# Patient Record
Sex: FEMALE | Race: WHITE | NOT HISPANIC OR LATINO | Employment: OTHER | URBAN - METROPOLITAN AREA
[De-identification: names, ages, dates, MRNs, and addresses within clinical notes are randomized per-mention and may not be internally consistent; named-entity substitution may affect disease eponyms.]

---

## 2021-02-05 LAB — BCS RECOMMENDATION EXT: NORMAL

## 2022-04-12 ENCOUNTER — HISTORICAL (OUTPATIENT)
Dept: LAB | Facility: HOSPITAL | Age: 72
End: 2022-04-12

## 2022-04-12 LAB
ABS NEUT (OLG): 9.29 (ref 2.1–9.2)
CRP SERPL HS-MCNC: 9 MG/L (ref 0–5)
ERYTHROCYTE [DISTWIDTH] IN BLOOD BY AUTOMATED COUNT: 13.3 % (ref 11.5–17)
ERYTHROCYTE [SEDIMENTATION RATE] IN BLOOD: 25 MM/H (ref 0–30)
HCT VFR BLD AUTO: 39.7 % (ref 37–47)
HGB BLD-MCNC: 12.7 G/DL (ref 12–16)
MCH RBC QN AUTO: 29.9 PG (ref 27–31)
MCHC RBC AUTO-ENTMCNC: 32 G/DL (ref 33–36)
MCV RBC AUTO: 93.4 FL (ref 80–94)
NRBC BLD AUTO-RTO: 0 % (ref 0–0.2)
PLATELET # BLD AUTO: 308 10*3/UL (ref 130–400)
PMV BLD AUTO: 9.3 FL (ref 7.4–10.4)
RBC # BLD AUTO: 4.25 10*6/UL (ref 4.2–5.4)
WBC # SPEC AUTO: 41.2 10*3/UL (ref 4.5–11.5)

## 2022-05-12 DIAGNOSIS — T84.018D FAILURE OF TOTAL KNEE REPLACEMENT, SUBSEQUENT ENCOUNTER: Primary | ICD-10-CM

## 2022-05-12 DIAGNOSIS — Z96.659 FAILURE OF TOTAL KNEE REPLACEMENT, SUBSEQUENT ENCOUNTER: Primary | ICD-10-CM

## 2022-05-27 ENCOUNTER — OFFICE VISIT (OUTPATIENT)
Dept: ORTHOPEDICS | Facility: CLINIC | Age: 72
End: 2022-05-27
Payer: MEDICARE

## 2022-05-27 ENCOUNTER — HOSPITAL ENCOUNTER (OUTPATIENT)
Dept: RADIOLOGY | Facility: HOSPITAL | Age: 72
Discharge: HOME | End: 2022-05-27
Attending: NURSE PRACTITIONER
Payer: MEDICARE

## 2022-05-27 VITALS
DIASTOLIC BLOOD PRESSURE: 72 MMHG | HEART RATE: 79 BPM | WEIGHT: 180 LBS | SYSTOLIC BLOOD PRESSURE: 140 MMHG | HEIGHT: 60 IN | BODY MASS INDEX: 35.34 KG/M2 | TEMPERATURE: 98 F

## 2022-05-27 DIAGNOSIS — Z01.818 PREOP TESTING: ICD-10-CM

## 2022-05-27 DIAGNOSIS — T84.82XS ARTHROFIBROSIS OF TOTAL KNEE ARTHROPLASTY, SEQUELA: Primary | ICD-10-CM

## 2022-05-27 DIAGNOSIS — T84.82XS ARTHROFIBROSIS OF TOTAL KNEE ARTHROPLASTY, SEQUELA: ICD-10-CM

## 2022-05-27 PROCEDURE — 71046 X-RAY EXAM CHEST 2 VIEWS: CPT | Mod: TC

## 2022-05-27 PROCEDURE — 99213 OFFICE O/P EST LOW 20 MIN: CPT | Mod: ,,, | Performed by: NURSE PRACTITIONER

## 2022-05-27 PROCEDURE — 99213 PR OFFICE/OUTPT VISIT, EST, LEVL III, 20-29 MIN: ICD-10-PCS | Mod: ,,, | Performed by: NURSE PRACTITIONER

## 2022-05-27 RX ORDER — TRANEXAMIC ACID 650 MG/1
1950 TABLET ORAL
Status: CANCELLED | OUTPATIENT
Start: 2022-05-27 | End: 2022-05-27

## 2022-05-27 RX ORDER — GABAPENTIN 100 MG/1
600 CAPSULE ORAL
Status: CANCELLED | OUTPATIENT
Start: 2022-05-27

## 2022-05-27 RX ORDER — LISDEXAMFETAMINE DIMESYLATE 30 MG/1
30 CAPSULE ORAL EVERY MORNING
COMMUNITY
Start: 2021-09-29

## 2022-05-27 RX ORDER — B-COMPLEX WITH VITAMIN C
1 TABLET ORAL DAILY
COMMUNITY

## 2022-05-27 RX ORDER — PRAMIPEXOLE DIHYDROCHLORIDE 1.5 MG/1
1.5 TABLET ORAL 2 TIMES DAILY
COMMUNITY
Start: 2021-07-21

## 2022-05-27 RX ORDER — TELMISARTAN AND HYDROCHLORTHIAZIDE 40; 12.5 MG/1; MG/1
1 TABLET ORAL DAILY
COMMUNITY
Start: 2021-07-21

## 2022-05-27 RX ORDER — KETOROLAC TROMETHAMINE 30 MG/ML
15 INJECTION, SOLUTION INTRAMUSCULAR; INTRAVENOUS
Status: CANCELLED | OUTPATIENT
Start: 2022-05-27 | End: 2022-05-27

## 2022-05-27 RX ORDER — MELOXICAM 7.5 MG/1
7.5 TABLET ORAL DAILY
Status: ON HOLD | COMMUNITY
Start: 2022-02-26 | End: 2022-06-08 | Stop reason: CLARIF

## 2022-05-27 RX ORDER — CHOLECALCIFEROL (VITAMIN D3) 125 MCG
5000 CAPSULE ORAL DAILY
COMMUNITY

## 2022-05-27 RX ORDER — SODIUM CHLORIDE 9 MG/ML
INJECTION, SOLUTION INTRAVENOUS CONTINUOUS
Status: CANCELLED | OUTPATIENT
Start: 2022-05-27

## 2022-05-27 RX ORDER — SCOLOPAMINE TRANSDERMAL SYSTEM 1 MG/1
1 PATCH, EXTENDED RELEASE TRANSDERMAL
Status: CANCELLED | OUTPATIENT
Start: 2022-05-27

## 2022-05-27 RX ORDER — PREGABALIN 75 MG/1
75 CAPSULE ORAL
COMMUNITY
Start: 2021-09-29 | End: 2022-06-10

## 2022-05-27 RX ORDER — AMITRIPTYLINE HYDROCHLORIDE 50 MG/1
50 TABLET, FILM COATED ORAL
Status: ON HOLD | COMMUNITY
Start: 2021-07-21 | End: 2022-06-08 | Stop reason: CLARIF

## 2022-05-27 RX ORDER — ACETAMINOPHEN 500 MG
1000 TABLET ORAL
Status: CANCELLED | OUTPATIENT
Start: 2022-05-27 | End: 2022-05-27

## 2022-05-27 RX ORDER — AMLODIPINE BESYLATE 5 MG/1
5 TABLET ORAL
Status: ON HOLD | COMMUNITY
Start: 2021-07-21 | End: 2022-06-10 | Stop reason: SDUPTHER

## 2022-05-27 RX ORDER — METOPROLOL TARTRATE 50 MG/1
75 TABLET ORAL DAILY
COMMUNITY
Start: 2021-07-21

## 2022-05-27 RX ORDER — PANTOPRAZOLE SODIUM 40 MG/1
40 TABLET, DELAYED RELEASE ORAL DAILY
COMMUNITY
Start: 2021-07-21 | End: 2023-05-02 | Stop reason: SDUPTHER

## 2022-05-27 NOTE — PROGRESS NOTES
Past Medical History:   Diagnosis Date    Acid reflux     CLL (chronic lymphocytic leukemia)     Hypertension        Past Surgical History:   Procedure Laterality Date    CARPAL TUNNEL RELEASE      CYST REMOVAL      KNEE SURGERY      lumpectomy      SHOULDER SURGERY      SINUS SURGERY      spinal injections      THYROID SURGERY      TONSILLECTOMY         Current Outpatient Medications   Medication Sig    amitriptyline (ELAVIL) 50 MG tablet Take 50 mg by mouth.    amLODIPine (NORVASC) 5 MG tablet Take 5 mg by mouth.    B-complex with vitamin C (Z-BEC OR EQUIV) tablet Take 1 tablet by mouth once daily.    cholecalciferol, vitamin D3, 125 mcg (5,000 unit) capsule Take 5,000 Units by mouth.    lisdexamfetamine (VYVANSE) 70 MG capsule Take 70 mg by mouth.    meloxicam (MOBIC) 7.5 MG tablet Take 7.5 mg by mouth once daily.    metoprolol tartrate (LOPRESSOR) 50 MG tablet Take 75 mg by mouth.    pantoprazole (PROTONIX) 40 MG tablet Take 40 mg by mouth.    polysaccharide iron complex 200 mg iron Cap Take 200 mg by mouth.    pramipexole (MIRAPEX) 1.5 MG tablet Take 1.5 mg by mouth 2 (two) times a day.    pregabalin (LYRICA) 75 MG capsule Take 75 mg by mouth.    telmisartan-hydrochlorothiazide (MICARDIS HCT) 40-12.5 mg per tablet Take by mouth.    vitamin B complex (B COMPLEX 1 ORAL)      No current facility-administered medications for this visit.       Review of patient's allergies indicates:   Allergen Reactions    Duloxetine      Other reaction(s): Bradycardia, Hypotension, Vomit    Lorazepam      Other reaction(s): Confusion  amnesia         Family History   Family history unknown: Yes       Social History     Socioeconomic History    Marital status:    Tobacco Use    Smoking status: Former Smoker    Smokeless tobacco: Never Used   Substance and Sexual Activity    Alcohol use: Never    Drug use: Never       Chief Complaint:   Chief Complaint   Patient presents with    Left Knee -  Pain    Pre-op Exam     Revision of left Total knee 6/8/22, C/O intermitten pain in left knee at this time, difficulty walking due to leg not being straight.       History of present illness: Amita Trejo is a 71 y.o. female, presents to clinic today in regards to her left knee pain. She has a hx of a L TKA with significant arthrofibrosis. This continues to be ongoing despite aggressive PT for ROM. This has affected her daily living activities. She does feel as though she is reached a point of instability and would like to proceed with a revision of left total knee arthroplasty.        Review of Systems:    Denies fevers, chills, chest pain, shortness of breath. Comprehensive review of systems performed and otherwise negative except as noted in HPI     Physical Examination:    General: awake and alert, no acute distress, healthy appearing  Head and Neck: Head atraumatic/normocephalic. Moist MM  CV: brisk cap refill  Lungs: non-labored breathing, w/o cough or SOB  Skin: no rashes present, warm to touch  Neuro: sensation grossly intact distally       Vital Signs:    Vitals:    05/27/22 0945   BP: (!) 140/72   Pulse: 79   Temp: 97.6 °F (36.4 °C)       Body mass index is 35.15 kg/m².    Examination left knee:  Incision clean dry and intact. No erythema or drainage or signs of infection.  Sensation intact distally to left foot  Positive FHL/EHL/gastrocsoleus/tib ant  Brisk capillary refill to left foot  No swelling or signs of DVT  Range of motion: extension at 15 and flexion at 90  Stable to varus valgus  Stable to anterior and posterior drawer       Assessment::Arthrofibrosis Left total knee arthroplasty    Plan:  At this point the patient is tried and failed all conservative management with regards to their left knee status post left knee arthroplasty. They have tried and failed nonoperative management including: Anti-inflammatories, activity modification. All of these have failed to completely remove their pain.  They have pain going up and down stairs as well as walking on level ground. The knee pain is affecting activities of daily living They feel that they've reached a point of disability with regards to their knee. The patient was worked up for infection and found to be negative.The patient would like to proceed with surgical intervention and would be a good candidate for a revision of left total knee replacement.    Total knee arthroplasty procedure, alternatives, risks, and benefits were discussed in detail. The risks including but not limited to: infection, need for revision surgery, pain, swelling, loosening, injury to surrounding neurovascular structures, stiffness, incomplete resolution of pain, DVT, PE, and death were discussed in detail. Despite these risks, the patient would like to proceed with surgical intervention. All questions were answered, no guarantees made. Will plan for revision of left TKA on 06/08/22.    The above findings, diagnostics, and treatment plan were discussed with Dr. Sohan Bowens who is in agreement with the plan of care.    This note was created using [employer] voice recognition software that occasionally misinterpreted phrases or words.    Consult note is delivered via Epic messaging service.

## 2022-05-27 NOTE — H&P (VIEW-ONLY)
Past Medical History:   Diagnosis Date    Acid reflux     CLL (chronic lymphocytic leukemia)     Hypertension        Past Surgical History:   Procedure Laterality Date    CARPAL TUNNEL RELEASE      CYST REMOVAL      KNEE SURGERY      lumpectomy      SHOULDER SURGERY      SINUS SURGERY      spinal injections      THYROID SURGERY      TONSILLECTOMY         Current Outpatient Medications   Medication Sig    amitriptyline (ELAVIL) 50 MG tablet Take 50 mg by mouth.    amLODIPine (NORVASC) 5 MG tablet Take 5 mg by mouth.    B-complex with vitamin C (Z-BEC OR EQUIV) tablet Take 1 tablet by mouth once daily.    cholecalciferol, vitamin D3, 125 mcg (5,000 unit) capsule Take 5,000 Units by mouth.    lisdexamfetamine (VYVANSE) 70 MG capsule Take 70 mg by mouth.    meloxicam (MOBIC) 7.5 MG tablet Take 7.5 mg by mouth once daily.    metoprolol tartrate (LOPRESSOR) 50 MG tablet Take 75 mg by mouth.    pantoprazole (PROTONIX) 40 MG tablet Take 40 mg by mouth.    polysaccharide iron complex 200 mg iron Cap Take 200 mg by mouth.    pramipexole (MIRAPEX) 1.5 MG tablet Take 1.5 mg by mouth 2 (two) times a day.    pregabalin (LYRICA) 75 MG capsule Take 75 mg by mouth.    telmisartan-hydrochlorothiazide (MICARDIS HCT) 40-12.5 mg per tablet Take by mouth.    vitamin B complex (B COMPLEX 1 ORAL)      No current facility-administered medications for this visit.       Review of patient's allergies indicates:   Allergen Reactions    Duloxetine      Other reaction(s): Bradycardia, Hypotension, Vomit    Lorazepam      Other reaction(s): Confusion  amnesia         Family History   Family history unknown: Yes       Social History     Socioeconomic History    Marital status:    Tobacco Use    Smoking status: Former Smoker    Smokeless tobacco: Never Used   Substance and Sexual Activity    Alcohol use: Never    Drug use: Never       Chief Complaint:   Chief Complaint   Patient presents with    Left Knee -  Pain    Pre-op Exam     Revision of left Total knee 6/8/22, C/O intermitten pain in left knee at this time, difficulty walking due to leg not being straight.       History of present illness: Amita Trejo is a 71 y.o. female, presents to clinic today in regards to her left knee pain. She has a hx of a L TKA with significant arthrofibrosis. This continues to be ongoing despite aggressive PT for ROM. This has affected her daily living activities. She does feel as though she is reached a point of instability and would like to proceed with a revision of left total knee arthroplasty.        Review of Systems:    Denies fevers, chills, chest pain, shortness of breath. Comprehensive review of systems performed and otherwise negative except as noted in HPI     Physical Examination:    General: awake and alert, no acute distress, healthy appearing  Head and Neck: Head atraumatic/normocephalic. Moist MM  CV: brisk cap refill  Lungs: non-labored breathing, w/o cough or SOB  Skin: no rashes present, warm to touch  Neuro: sensation grossly intact distally       Vital Signs:    Vitals:    05/27/22 0945   BP: (!) 140/72   Pulse: 79   Temp: 97.6 °F (36.4 °C)       Body mass index is 35.15 kg/m².    Examination left knee:  Incision clean dry and intact. No erythema or drainage or signs of infection.  Sensation intact distally to left foot  Positive FHL/EHL/gastrocsoleus/tib ant  Brisk capillary refill to left foot  No swelling or signs of DVT  Range of motion: extension at 15 and flexion at 90  Stable to varus valgus  Stable to anterior and posterior drawer       Assessment::Arthrofibrosis Left total knee arthroplasty    Plan:  At this point the patient is tried and failed all conservative management with regards to their left knee status post left knee arthroplasty. They have tried and failed nonoperative management including: Anti-inflammatories, activity modification. All of these have failed to completely remove their pain.  They have pain going up and down stairs as well as walking on level ground. The knee pain is affecting activities of daily living They feel that they've reached a point of disability with regards to their knee. The patient was worked up for infection and found to be negative.The patient would like to proceed with surgical intervention and would be a good candidate for a revision of left total knee replacement.    Total knee arthroplasty procedure, alternatives, risks, and benefits were discussed in detail. The risks including but not limited to: infection, need for revision surgery, pain, swelling, loosening, injury to surrounding neurovascular structures, stiffness, incomplete resolution of pain, DVT, PE, and death were discussed in detail. Despite these risks, the patient would like to proceed with surgical intervention. All questions were answered, no guarantees made. Will plan for revision of left TKA on 06/08/22.    The above findings, diagnostics, and treatment plan were discussed with Dr. Sohan Bowens who is in agreement with the plan of care.    This note was created using [employer] voice recognition software that occasionally misinterpreted phrases or words.    Consult note is delivered via Epic messaging service.

## 2022-06-08 ENCOUNTER — ANESTHESIA (OUTPATIENT)
Dept: SURGERY | Facility: HOSPITAL | Age: 72
DRG: 468 | End: 2022-06-08
Payer: MEDICARE

## 2022-06-08 ENCOUNTER — ANESTHESIA EVENT (OUTPATIENT)
Dept: SURGERY | Facility: HOSPITAL | Age: 72
DRG: 468 | End: 2022-06-08
Payer: MEDICARE

## 2022-06-08 ENCOUNTER — HOSPITAL ENCOUNTER (INPATIENT)
Facility: HOSPITAL | Age: 72
LOS: 2 days | Discharge: HOME | DRG: 468 | End: 2022-06-10
Attending: ORTHOPAEDIC SURGERY | Admitting: ORTHOPAEDIC SURGERY
Payer: MEDICARE

## 2022-06-08 DIAGNOSIS — T84.82XS ARTHROFIBROSIS OF TOTAL KNEE ARTHROPLASTY, SEQUELA: ICD-10-CM

## 2022-06-08 DIAGNOSIS — T84.018D FAILURE OF TOTAL KNEE REPLACEMENT, SUBSEQUENT ENCOUNTER: ICD-10-CM

## 2022-06-08 DIAGNOSIS — Z96.659 FAILURE OF TOTAL KNEE REPLACEMENT, SUBSEQUENT ENCOUNTER: ICD-10-CM

## 2022-06-08 DIAGNOSIS — Z01.818 PREOP TESTING: ICD-10-CM

## 2022-06-08 PROBLEM — T84.018A FAILED TOTAL KNEE ARTHROPLASTY: Status: ACTIVE | Noted: 2022-06-08

## 2022-06-08 LAB
HCT VFR BLD AUTO: 37.1 % (ref 37–47)
HGB BLD-MCNC: 11.8 GM/DL (ref 12–16)
POCT GLUCOSE: 108 MG/DL (ref 70–110)

## 2022-06-08 PROCEDURE — 63600175 PHARM REV CODE 636 W HCPCS: Performed by: ORTHOPAEDIC SURGERY

## 2022-06-08 PROCEDURE — 36000711: Performed by: ORTHOPAEDIC SURGERY

## 2022-06-08 PROCEDURE — 87070 CULTURE OTHR SPECIMN AEROBIC: CPT | Performed by: ORTHOPAEDIC SURGERY

## 2022-06-08 PROCEDURE — 27800903 OPTIME MED/SURG SUP & DEVICES OTHER IMPLANTS: Performed by: ORTHOPAEDIC SURGERY

## 2022-06-08 PROCEDURE — 37000008 HC ANESTHESIA 1ST 15 MINUTES: Performed by: ORTHOPAEDIC SURGERY

## 2022-06-08 PROCEDURE — C1776 JOINT DEVICE (IMPLANTABLE): HCPCS | Performed by: ORTHOPAEDIC SURGERY

## 2022-06-08 PROCEDURE — 25000003 PHARM REV CODE 250

## 2022-06-08 PROCEDURE — 94799 UNLISTED PULMONARY SVC/PX: CPT

## 2022-06-08 PROCEDURE — 36415 COLL VENOUS BLD VENIPUNCTURE: CPT | Performed by: ORTHOPAEDIC SURGERY

## 2022-06-08 PROCEDURE — 27487 PR REVISE KNEE JOINT REPLACE,ALL PARTS: ICD-10-PCS | Mod: LT,,, | Performed by: ORTHOPAEDIC SURGERY

## 2022-06-08 PROCEDURE — 63600175 PHARM REV CODE 636 W HCPCS: Performed by: NURSE ANESTHETIST, CERTIFIED REGISTERED

## 2022-06-08 PROCEDURE — 88331 PATH CONSLTJ SURG 1 BLK 1SPC: CPT | Performed by: ORTHOPAEDIC SURGERY

## 2022-06-08 PROCEDURE — 27201423 OPTIME MED/SURG SUP & DEVICES STERILE SUPPLY: Performed by: ORTHOPAEDIC SURGERY

## 2022-06-08 PROCEDURE — 30000890 SPECIMEN TO PATHOLOGY: Performed by: ORTHOPAEDIC SURGERY

## 2022-06-08 PROCEDURE — A4216 STERILE WATER/SALINE, 10 ML: HCPCS | Performed by: ORTHOPAEDIC SURGERY

## 2022-06-08 PROCEDURE — 88300 SURGICAL PATH GROSS: CPT | Performed by: ORTHOPAEDIC SURGERY

## 2022-06-08 PROCEDURE — 87075 CULTR BACTERIA EXCEPT BLOOD: CPT | Performed by: ORTHOPAEDIC SURGERY

## 2022-06-08 PROCEDURE — 25000003 PHARM REV CODE 250: Performed by: ORTHOPAEDIC SURGERY

## 2022-06-08 PROCEDURE — 51702 INSERT TEMP BLADDER CATH: CPT | Performed by: ORTHOPAEDIC SURGERY

## 2022-06-08 PROCEDURE — 11000001 HC ACUTE MED/SURG PRIVATE ROOM

## 2022-06-08 PROCEDURE — 71000033 HC RECOVERY, INTIAL HOUR: Performed by: ORTHOPAEDIC SURGERY

## 2022-06-08 PROCEDURE — 36000710: Performed by: ORTHOPAEDIC SURGERY

## 2022-06-08 PROCEDURE — 25000003 PHARM REV CODE 250: Performed by: NURSE ANESTHETIST, CERTIFIED REGISTERED

## 2022-06-08 PROCEDURE — 25000003 PHARM REV CODE 250: Performed by: NURSE PRACTITIONER

## 2022-06-08 PROCEDURE — 51798 US URINE CAPACITY MEASURE: CPT | Performed by: ORTHOPAEDIC SURGERY

## 2022-06-08 PROCEDURE — 51798 US URINE CAPACITY MEASURE: CPT

## 2022-06-08 PROCEDURE — 85014 HEMATOCRIT: CPT | Performed by: ORTHOPAEDIC SURGERY

## 2022-06-08 PROCEDURE — 97162 PT EVAL MOD COMPLEX 30 MIN: CPT

## 2022-06-08 PROCEDURE — C1713 ANCHOR/SCREW BN/BN,TIS/BN: HCPCS | Performed by: ORTHOPAEDIC SURGERY

## 2022-06-08 PROCEDURE — 27487 REVISE/REPLACE KNEE JOINT: CPT | Mod: LT,,, | Performed by: ORTHOPAEDIC SURGERY

## 2022-06-08 PROCEDURE — 30000890 HC MISC. SEND OUT TEST: Performed by: ORTHOPAEDIC SURGERY

## 2022-06-08 PROCEDURE — 87205 SMEAR GRAM STAIN: CPT | Performed by: ORTHOPAEDIC SURGERY

## 2022-06-08 PROCEDURE — 37000009 HC ANESTHESIA EA ADD 15 MINS: Performed by: ORTHOPAEDIC SURGERY

## 2022-06-08 PROCEDURE — 94761 N-INVAS EAR/PLS OXIMETRY MLT: CPT

## 2022-06-08 PROCEDURE — 88304 TISSUE EXAM BY PATHOLOGIST: CPT | Performed by: ORTHOPAEDIC SURGERY

## 2022-06-08 DEVICE — TOBRA FULL DOSE ANTIBIOTIC BONE CEMENT, 10 PACK CATALOG NUMBER IS 6197-9-010
Type: IMPLANTABLE DEVICE | Site: KNEE | Status: FUNCTIONAL
Brand: SIMPLEX

## 2022-06-08 DEVICE — FLUTED STEM EXT DIAMETER 12MM X 80MM
Type: IMPLANTABLE DEVICE | Site: KNEE | Status: FUNCTIONAL
Brand: BALANCED KNEE REVISION SYSTEM

## 2022-06-08 RX ORDER — BISACODYL 10 MG
10 SUPPOSITORY, RECTAL RECTAL DAILY
Status: DISCONTINUED | OUTPATIENT
Start: 2022-06-11 | End: 2022-06-10 | Stop reason: HOSPADM

## 2022-06-08 RX ORDER — LIDOCAINE HYDROCHLORIDE 10 MG/ML
INJECTION, SOLUTION EPIDURAL; INFILTRATION; INTRACAUDAL; PERINEURAL
Status: DISCONTINUED | OUTPATIENT
Start: 2022-06-08 | End: 2022-06-08

## 2022-06-08 RX ORDER — IPRATROPIUM BROMIDE AND ALBUTEROL SULFATE 2.5; .5 MG/3ML; MG/3ML
3 SOLUTION RESPIRATORY (INHALATION) ONCE AS NEEDED
Status: DISCONTINUED | OUTPATIENT
Start: 2022-06-08 | End: 2022-06-10 | Stop reason: HOSPADM

## 2022-06-08 RX ORDER — ROPIVACAINE HYDROCHLORIDE 5 MG/ML
INJECTION, SOLUTION EPIDURAL; INFILTRATION; PERINEURAL
Status: DISCONTINUED | OUTPATIENT
Start: 2022-06-08 | End: 2022-06-08 | Stop reason: HOSPADM

## 2022-06-08 RX ORDER — TALC
6 POWDER (GRAM) TOPICAL NIGHTLY PRN
Status: DISCONTINUED | OUTPATIENT
Start: 2022-06-08 | End: 2022-06-10 | Stop reason: HOSPADM

## 2022-06-08 RX ORDER — SODIUM CITRATE AND CITRIC ACID MONOHYDRATE 334; 500 MG/5ML; MG/5ML
30 SOLUTION ORAL ONCE
Status: CANCELLED | OUTPATIENT
Start: 2022-06-08 | End: 2022-06-08

## 2022-06-08 RX ORDER — FAMOTIDINE 20 MG/1
20 TABLET, FILM COATED ORAL 2 TIMES DAILY
Status: DISCONTINUED | OUTPATIENT
Start: 2022-06-08 | End: 2022-06-10 | Stop reason: HOSPADM

## 2022-06-08 RX ORDER — ONDANSETRON 2 MG/ML
4 INJECTION INTRAMUSCULAR; INTRAVENOUS EVERY 6 HOURS PRN
Status: DISCONTINUED | OUTPATIENT
Start: 2022-06-08 | End: 2022-06-10 | Stop reason: HOSPADM

## 2022-06-08 RX ORDER — CALCIUM CARBONATE 200(500)MG
500 TABLET,CHEWABLE ORAL 3 TIMES DAILY PRN
Status: DISCONTINUED | OUTPATIENT
Start: 2022-06-08 | End: 2022-06-10 | Stop reason: HOSPADM

## 2022-06-08 RX ORDER — KETOROLAC TROMETHAMINE 30 MG/ML
INJECTION, SOLUTION INTRAMUSCULAR; INTRAVENOUS
Status: DISCONTINUED | OUTPATIENT
Start: 2022-06-08 | End: 2022-06-08 | Stop reason: HOSPADM

## 2022-06-08 RX ORDER — HYDROMORPHONE HYDROCHLORIDE 2 MG/ML
0.2 INJECTION, SOLUTION INTRAMUSCULAR; INTRAVENOUS; SUBCUTANEOUS EVERY 5 MIN PRN
Status: DISCONTINUED | OUTPATIENT
Start: 2022-06-08 | End: 2022-06-08

## 2022-06-08 RX ORDER — EPINEPHRINE 1 MG/ML
INJECTION, SOLUTION INTRACARDIAC; INTRAMUSCULAR; INTRAVENOUS; SUBCUTANEOUS
Status: DISPENSED
Start: 2022-06-08 | End: 2022-06-08

## 2022-06-08 RX ORDER — ACETAMINOPHEN 10 MG/ML
1000 INJECTION, SOLUTION INTRAVENOUS ONCE
Status: COMPLETED | OUTPATIENT
Start: 2022-06-08 | End: 2022-06-08

## 2022-06-08 RX ORDER — TRANEXAMIC ACID 650 MG/1
1950 TABLET ORAL
Status: COMPLETED | OUTPATIENT
Start: 2022-06-08 | End: 2022-06-08

## 2022-06-08 RX ORDER — ONDANSETRON 4 MG/1
8 TABLET, ORALLY DISINTEGRATING ORAL EVERY 6 HOURS PRN
Status: CANCELLED | OUTPATIENT
Start: 2022-06-08

## 2022-06-08 RX ORDER — CEFAZOLIN SODIUM 2 G/50ML
2 SOLUTION INTRAVENOUS
Status: COMPLETED | OUTPATIENT
Start: 2022-06-08 | End: 2022-06-09

## 2022-06-08 RX ORDER — METOCLOPRAMIDE HYDROCHLORIDE 5 MG/ML
10 INJECTION INTRAMUSCULAR; INTRAVENOUS
Status: COMPLETED | OUTPATIENT
Start: 2022-06-08 | End: 2022-06-09

## 2022-06-08 RX ORDER — MIDAZOLAM HYDROCHLORIDE 1 MG/ML
2 INJECTION INTRAMUSCULAR; INTRAVENOUS ONCE AS NEEDED
Status: CANCELLED | OUTPATIENT
Start: 2022-06-08 | End: 2033-11-03

## 2022-06-08 RX ORDER — ROPIVACAINE HYDROCHLORIDE 5 MG/ML
INJECTION, SOLUTION EPIDURAL; INFILTRATION; PERINEURAL
Status: DISPENSED
Start: 2022-06-08 | End: 2022-06-08

## 2022-06-08 RX ORDER — SODIUM CHLORIDE, SODIUM GLUCONATE, SODIUM ACETATE, POTASSIUM CHLORIDE AND MAGNESIUM CHLORIDE 30; 37; 368; 526; 502 MG/100ML; MG/100ML; MG/100ML; MG/100ML; MG/100ML
1000 INJECTION, SOLUTION INTRAVENOUS CONTINUOUS
Status: CANCELLED | OUTPATIENT
Start: 2022-06-08 | End: 2022-07-08

## 2022-06-08 RX ORDER — SODIUM CHLORIDE 9 MG/ML
INJECTION, SOLUTION INTRAVENOUS CONTINUOUS
Status: DISCONTINUED | OUTPATIENT
Start: 2022-06-08 | End: 2022-06-10 | Stop reason: HOSPADM

## 2022-06-08 RX ORDER — SODIUM CHLORIDE 0.9 % (FLUSH) 0.9 %
SYRINGE (ML) INJECTION
Status: DISPENSED
Start: 2022-06-08 | End: 2022-06-08

## 2022-06-08 RX ORDER — METHOCARBAMOL 750 MG/1
750 TABLET, FILM COATED ORAL EVERY 8 HOURS PRN
Status: DISCONTINUED | OUTPATIENT
Start: 2022-06-08 | End: 2022-06-10 | Stop reason: HOSPADM

## 2022-06-08 RX ORDER — BUPIVACAINE HYDROCHLORIDE 7.5 MG/ML
INJECTION, SOLUTION EPIDURAL; RETROBULBAR
Status: COMPLETED | OUTPATIENT
Start: 2022-06-08 | End: 2022-06-08

## 2022-06-08 RX ORDER — SCOLOPAMINE TRANSDERMAL SYSTEM 1 MG/1
1 PATCH, EXTENDED RELEASE TRANSDERMAL
Status: DISCONTINUED | OUTPATIENT
Start: 2022-06-08 | End: 2022-06-10 | Stop reason: HOSPADM

## 2022-06-08 RX ORDER — ACETAMINOPHEN 500 MG
1000 TABLET ORAL
Status: COMPLETED | OUTPATIENT
Start: 2022-06-08 | End: 2022-06-08

## 2022-06-08 RX ORDER — GABAPENTIN 300 MG/1
600 CAPSULE ORAL
Status: COMPLETED | OUTPATIENT
Start: 2022-06-08 | End: 2022-06-08

## 2022-06-08 RX ORDER — SODIUM CHLORIDE 9 MG/ML
INJECTION, SOLUTION INTRAMUSCULAR; INTRAVENOUS; SUBCUTANEOUS
Status: DISCONTINUED | OUTPATIENT
Start: 2022-06-08 | End: 2022-06-08 | Stop reason: HOSPADM

## 2022-06-08 RX ORDER — NAPROXEN SODIUM 220 MG/1
81 TABLET, FILM COATED ORAL 2 TIMES DAILY
Status: DISCONTINUED | OUTPATIENT
Start: 2022-06-09 | End: 2022-06-10 | Stop reason: HOSPADM

## 2022-06-08 RX ORDER — DEXAMETHASONE SODIUM PHOSPHATE 4 MG/ML
INJECTION, SOLUTION INTRA-ARTICULAR; INTRALESIONAL; INTRAMUSCULAR; INTRAVENOUS; SOFT TISSUE
Status: DISCONTINUED | OUTPATIENT
Start: 2022-06-08 | End: 2022-06-08

## 2022-06-08 RX ORDER — GABAPENTIN 300 MG/1
300 CAPSULE ORAL NIGHTLY
Status: DISCONTINUED | OUTPATIENT
Start: 2022-06-08 | End: 2022-06-10

## 2022-06-08 RX ORDER — MEPERIDINE HYDROCHLORIDE 25 MG/ML
12.5 INJECTION INTRAMUSCULAR; INTRAVENOUS; SUBCUTANEOUS ONCE AS NEEDED
Status: DISCONTINUED | OUTPATIENT
Start: 2022-06-08 | End: 2022-06-08

## 2022-06-08 RX ORDER — ACETAMINOPHEN 10 MG/ML
1000 INJECTION, SOLUTION INTRAVENOUS ONCE
Status: DISCONTINUED | OUTPATIENT
Start: 2022-06-08 | End: 2022-06-08

## 2022-06-08 RX ORDER — EPINEPHRINE 1 MG/ML
INJECTION, SOLUTION INTRACARDIAC; INTRAMUSCULAR; INTRAVENOUS; SUBCUTANEOUS
Status: DISCONTINUED | OUTPATIENT
Start: 2022-06-08 | End: 2022-06-08 | Stop reason: HOSPADM

## 2022-06-08 RX ORDER — KETOROLAC TROMETHAMINE 30 MG/ML
15 INJECTION, SOLUTION INTRAMUSCULAR; INTRAVENOUS
Status: ACTIVE | OUTPATIENT
Start: 2022-06-08 | End: 2022-06-08

## 2022-06-08 RX ORDER — KETOROLAC TROMETHAMINE 30 MG/ML
INJECTION, SOLUTION INTRAMUSCULAR; INTRAVENOUS
Status: DISPENSED
Start: 2022-06-08 | End: 2022-06-08

## 2022-06-08 RX ORDER — AMLODIPINE BESYLATE 5 MG/1
5 TABLET ORAL DAILY
Status: DISCONTINUED | OUTPATIENT
Start: 2022-06-08 | End: 2022-06-10

## 2022-06-08 RX ORDER — DOCUSATE SODIUM 100 MG/1
200 CAPSULE, LIQUID FILLED ORAL DAILY
Status: DISCONTINUED | OUTPATIENT
Start: 2022-06-09 | End: 2022-06-10 | Stop reason: HOSPADM

## 2022-06-08 RX ORDER — MIDAZOLAM HYDROCHLORIDE 1 MG/ML
INJECTION INTRAMUSCULAR; INTRAVENOUS
Status: DISCONTINUED | OUTPATIENT
Start: 2022-06-08 | End: 2022-06-08

## 2022-06-08 RX ORDER — LACTULOSE 10 G/15ML
20 SOLUTION ORAL EVERY 6 HOURS PRN
Status: DISCONTINUED | OUTPATIENT
Start: 2022-06-08 | End: 2022-06-10 | Stop reason: HOSPADM

## 2022-06-08 RX ORDER — PROPOFOL 10 MG/ML
VIAL (ML) INTRAVENOUS CONTINUOUS PRN
Status: DISCONTINUED | OUTPATIENT
Start: 2022-06-08 | End: 2022-06-08

## 2022-06-08 RX ORDER — TRAMADOL HYDROCHLORIDE 50 MG/1
50 TABLET ORAL EVERY 4 HOURS PRN
Status: DISCONTINUED | OUTPATIENT
Start: 2022-06-08 | End: 2022-06-10

## 2022-06-08 RX ORDER — HYDROCODONE BITARTRATE AND ACETAMINOPHEN 5; 325 MG/1; MG/1
1 TABLET ORAL EVERY 4 HOURS PRN
Status: DISCONTINUED | OUTPATIENT
Start: 2022-06-08 | End: 2022-06-10

## 2022-06-08 RX ORDER — EPHEDRINE SULFATE 50 MG/ML
INJECTION, SOLUTION INTRAVENOUS
Status: DISCONTINUED | OUTPATIENT
Start: 2022-06-08 | End: 2022-06-08

## 2022-06-08 RX ORDER — PHENYLEPHRINE HYDROCHLORIDE 10 MG/ML
INJECTION INTRAVENOUS CONTINUOUS PRN
Status: DISCONTINUED | OUTPATIENT
Start: 2022-06-08 | End: 2022-06-08

## 2022-06-08 RX ORDER — MORPHINE SULFATE 4 MG/ML
4 INJECTION, SOLUTION INTRAMUSCULAR; INTRAVENOUS
Status: DISCONTINUED | OUTPATIENT
Start: 2022-06-08 | End: 2022-06-10 | Stop reason: HOSPADM

## 2022-06-08 RX ORDER — MAG HYDROX/ALUMINUM HYD/SIMETH 200-200-20
30 SUSPENSION, ORAL (FINAL DOSE FORM) ORAL EVERY 6 HOURS PRN
Status: DISCONTINUED | OUTPATIENT
Start: 2022-06-08 | End: 2022-06-10 | Stop reason: HOSPADM

## 2022-06-08 RX ORDER — AMOXICILLIN 250 MG
2 CAPSULE ORAL 2 TIMES DAILY
Status: DISCONTINUED | OUTPATIENT
Start: 2022-06-08 | End: 2022-06-10 | Stop reason: HOSPADM

## 2022-06-08 RX ORDER — TELMISARTAN AND HYDROCHLORTHIAZIDE 40; 12.5 MG/1; MG/1
1 TABLET ORAL DAILY
Status: DISCONTINUED | OUTPATIENT
Start: 2022-06-08 | End: 2022-06-10 | Stop reason: HOSPADM

## 2022-06-08 RX ORDER — POLYETHYLENE GLYCOL 3350 17 G/17G
17 POWDER, FOR SOLUTION ORAL NIGHTLY
Status: DISCONTINUED | OUTPATIENT
Start: 2022-06-08 | End: 2022-06-10 | Stop reason: HOSPADM

## 2022-06-08 RX ORDER — MORPHINE SULFATE 10 MG/ML
INJECTION INTRAMUSCULAR; INTRAVENOUS; SUBCUTANEOUS
Status: DISPENSED
Start: 2022-06-08 | End: 2022-06-08

## 2022-06-08 RX ORDER — CEFAZOLIN SODIUM 2 G/50ML
2 SOLUTION INTRAVENOUS
Status: DISCONTINUED | OUTPATIENT
Start: 2022-06-08 | End: 2022-06-10 | Stop reason: HOSPADM

## 2022-06-08 RX ORDER — MORPHINE SULFATE 10 MG/ML
INJECTION INTRAMUSCULAR; INTRAVENOUS; SUBCUTANEOUS
Status: DISCONTINUED | OUTPATIENT
Start: 2022-06-08 | End: 2022-06-08 | Stop reason: HOSPADM

## 2022-06-08 RX ORDER — ONDANSETRON 2 MG/ML
INJECTION INTRAMUSCULAR; INTRAVENOUS
Status: DISCONTINUED | OUTPATIENT
Start: 2022-06-08 | End: 2022-06-08

## 2022-06-08 RX ORDER — LIDOCAINE HYDROCHLORIDE 10 MG/ML
1 INJECTION, SOLUTION EPIDURAL; INFILTRATION; INTRACAUDAL; PERINEURAL ONCE
Status: CANCELLED | OUTPATIENT
Start: 2022-06-08 | End: 2022-06-08

## 2022-06-08 RX ADMIN — PROPOFOL 50 MCG/KG/MIN: 10 INJECTION, EMULSION INTRAVENOUS at 10:06

## 2022-06-08 RX ADMIN — PHENYLEPHRINE HYDROCHLORIDE 0.2 MCG/KG/MIN: 10 INJECTION INTRAVENOUS at 10:06

## 2022-06-08 RX ADMIN — ACETAMINOPHEN 1000 MG: 500 TABLET ORAL at 07:06

## 2022-06-08 RX ADMIN — NORETHINDRONE AND ETHINYL ESTRADIOL 10 MG: KIT ORAL at 11:06

## 2022-06-08 RX ADMIN — SODIUM CHLORIDE: 9 INJECTION, SOLUTION INTRAVENOUS at 01:06

## 2022-06-08 RX ADMIN — POLYETHYLENE GLYCOL 3350 17 G: 17 POWDER, FOR SOLUTION ORAL at 08:06

## 2022-06-08 RX ADMIN — GABAPENTIN 600 MG: 300 CAPSULE ORAL at 07:06

## 2022-06-08 RX ADMIN — METOCLOPRAMIDE 10 MG: 5 INJECTION, SOLUTION INTRAMUSCULAR; INTRAVENOUS at 10:06

## 2022-06-08 RX ADMIN — TRANEXAMIC ACID 1950 MG: 650 TABLET ORAL at 07:06

## 2022-06-08 RX ADMIN — NORETHINDRONE AND ETHINYL ESTRADIOL 10 MG: KIT ORAL at 10:06

## 2022-06-08 RX ADMIN — LIDOCAINE HYDROCHLORIDE 40 MG: 10 INJECTION, SOLUTION EPIDURAL; INFILTRATION; INTRACAUDAL; PERINEURAL at 10:06

## 2022-06-08 RX ADMIN — METOPROLOL TARTRATE 75 MG: 50 TABLET, FILM COATED ORAL at 08:06

## 2022-06-08 RX ADMIN — SODIUM CHLORIDE, SODIUM GLUCONATE, SODIUM ACETATE, POTASSIUM CHLORIDE AND MAGNESIUM CHLORIDE: 526; 502; 368; 37; 30 INJECTION, SOLUTION INTRAVENOUS at 09:06

## 2022-06-08 RX ADMIN — METOCLOPRAMIDE 10 MG: 5 INJECTION, SOLUTION INTRAMUSCULAR; INTRAVENOUS at 03:06

## 2022-06-08 RX ADMIN — CEFAZOLIN SODIUM 2 G: 2 SOLUTION INTRAVENOUS at 03:06

## 2022-06-08 RX ADMIN — DEXAMETHASONE SODIUM PHOSPHATE 8 MG: 4 INJECTION, SOLUTION INTRA-ARTICULAR; INTRALESIONAL; INTRAMUSCULAR; INTRAVENOUS; SOFT TISSUE at 10:06

## 2022-06-08 RX ADMIN — FAMOTIDINE 20 MG: 20 TABLET ORAL at 08:06

## 2022-06-08 RX ADMIN — ACETAMINOPHEN 1000 MG: 10 INJECTION, SOLUTION INTRAVENOUS at 05:06

## 2022-06-08 RX ADMIN — CEFAZOLIN SODIUM 2 G: 2 SOLUTION INTRAVENOUS at 10:06

## 2022-06-08 RX ADMIN — DEXTROSE 2 G: 50 INJECTION, SOLUTION INTRAVENOUS at 09:06

## 2022-06-08 RX ADMIN — TRAMADOL HYDROCHLORIDE 50 MG: 50 TABLET, COATED ORAL at 02:06

## 2022-06-08 RX ADMIN — SENNOSIDES AND DOCUSATE SODIUM 2 TABLET: 50; 8.6 TABLET ORAL at 08:06

## 2022-06-08 RX ADMIN — GABAPENTIN 300 MG: 300 CAPSULE ORAL at 08:06

## 2022-06-08 RX ADMIN — NORETHINDRONE AND ETHINYL ESTRADIOL 10 MG: KIT ORAL at 12:06

## 2022-06-08 RX ADMIN — TRAMADOL HYDROCHLORIDE 50 MG: 50 TABLET, COATED ORAL at 08:06

## 2022-06-08 RX ADMIN — ONDANSETRON HYDROCHLORIDE 4 MG: 2 SOLUTION INTRAMUSCULAR; INTRAVENOUS at 12:06

## 2022-06-08 RX ADMIN — BUPIVACAINE HYDROCHLORIDE 2 ML: 7.5 INJECTION, SOLUTION EPIDURAL; RETROBULBAR at 10:06

## 2022-06-08 RX ADMIN — MIDAZOLAM 2 MG: 1 INJECTION INTRAMUSCULAR; INTRAVENOUS at 09:06

## 2022-06-08 NOTE — ANESTHESIA PROCEDURE NOTES
Spinal    Diagnosis: surgery  Patient location during procedure: OR    Staffing  Authorizing Provider: Mir Wang MD  Performing Provider: Mir Wang MD    Preanesthetic Checklist  Completed: patient identified, IV checked, site marked, risks and benefits discussed, surgical consent, monitors and equipment checked, pre-op evaluation and timeout performed  Spinal Block  Patient position: sitting  Prep: ChloraPrep  Patient monitoring: heart rate, continuous pulse ox and frequent blood pressure checks  Approach: midline  Location: L3-4  Injection technique: single shot  CSF Fluid: clear free-flowing CSF  Needle  Needle type: pencil-tip   Needle gauge: 25 G  Needle length: 3.5 in  Additional Documentation: negative aspiration for heme and no paresthesia on injection  Needle localization: anatomical landmarks  Assessment  Sensory level: T4   Dermatomal levels determined by alcohol wipe  Ease of block: easy  Patient's tolerance of the procedure: comfortable throughout block  Medications:    Medications: bupivacaine (pf) (MARCAINE) injection 0.75% - Intraspinal   2 mL - 6/8/2022 10:01:00 AM

## 2022-06-08 NOTE — BRIEF OP NOTE
OPERATIVE REPORT      Patient: Amita Trejo   : 1950    MRN: [id number]  Date: 2022      Surgeon: Sohan Bowens MD  Assistant: Sivan Dorsey NP, [facility].  Certified first assist was necessary as a skilled set of hands and was necessary for proper patient positioning as well as assistance with closure in place with multiple implants.  Preoperative Diagnosis:  Left failed TKA with arthofibrosis  Postoperative Diagnosis: Same  Procedure:  Revision L TKA - both components  Wound vac left knee  Anesthesiologist: Mir Wang MD  OR Staff: Circulator: Eddi Reed RN  Scrub Person: Paulo Zamarripa; BRENDAN Staton  Implants:   Implant Name Type Inv. Item Serial No.  Lot No. LRB No. Used Action   CEMENT BONE ANTIBIO SIMPLEX P - [id number]  CEMENT BONE ANTIBIO SIMPLEX P  [employer]. [id number] Left 2 Implanted   Stem 12mm 80mm fluted     [id number] Left 1 Implanted   femoral tapered junctional box 5 degree     [id number] Left 1 Implanted   femoral revision left size 2 non-porous     [id number] Left 1 Implanted   femoral 31mm M/L 22mm A/P sleeve     [id number] Left 1 Implanted   femoral locking bolt     [id number] Left 1 Implanted   stem 14mm 80mm slotted     [id number] Left 1 Implanted   tibial sleeve tray sz 3     [id number] Left 1 Implanted   tibial 35mm M/L 27mm A/P sleeve     [id number] Left 1 Implanted   tibial insert revision ck sz3 8mm     [id number] Left 1 Implanted     EBL: 82  Complications: None  Disposition: To PACU, stable    This note/OR report was created with the assistance of  voice recognition software or phone  dictation.  There may be transcription errors as a result of using this technology however minimal. Effort has been made to assure accuracy of transcription but any obvious errors or omissions should be clarified with the author of the document.

## 2022-06-08 NOTE — ANESTHESIA PREPROCEDURE EVALUATION
06/08/2022  Amita Trejo is a 71 y.o., female.      Pre-op Assessment    I have reviewed the Patient Summary Reports.     I have reviewed the Nursing Notes. I have reviewed the NPO Status.   I have reviewed the Medications.     Review of Systems      Physical Exam  General: Well nourished and Cooperative    Airway:  Mallampati: II   Mouth Opening: Normal  TM Distance: Normal  Tongue: Normal  Neck ROM: Normal ROM    Dental:  Intact    Chest/Lungs:  Clear to auscultation    Heart:  Rate: Normal        Anesthesia Plan  Type of Anesthesia, risks & benefits discussed:    Anesthesia Type: Spinal  Intra-op Monitoring Plan: Standard ASA Monitors  Post Op Pain Control Plan: multimodal analgesia  Induction:  IV  Informed Consent: Informed consent signed with the Patient and all parties understand the risks and agree with anesthesia plan.  All questions answered.   ASA Score: 2  Day of Surgery Review of History & Physical: H&P Update referred to the surgeon/provider.  Anesthesia Plan Notes: Previous back surgery, will attempt spinal GA backup    I explained anesthesia plan to patient/responsbile party if available.  Anesthesia consent done going over the material facts, risks, complications & alternatives, obtained which includes the possibility of altering the anesthesia plan.  I reviewed appropriate labs, any workup, Xray, EKG etc.  Patients condition is satisfactory to proceed with anesthesia plan unless otherwise noted (see anesthesia chart for details of the anesthesia plan carried out).       Ready For Surgery From Anesthesia Perspective.     .

## 2022-06-08 NOTE — PT/OT/SLP EVAL
"Physical Therapy Evaluation    Patient Name:  Amita Trejo   MRN:  [id number]    Recommendations:     Discharge Recommendations:  outpatient PT   Discharge Equipment Recommendations: walker, rolling   Barriers to discharge: None    Assessment:     Amita Trejo is a 71 y.o. female admitted with a medical diagnosis of Arthrofibrosis of total knee arthroplasty, sequela.      She presents with the following impairments/functional limitations:  decreased safety awareness, impaired muscle length, pain, impaired functional mobilty, impaired endurance, impaired balance, gait instability, weakness, decreased ROM.    Rehab Prognosis: Good; patient would benefit from acute skilled PT services to address these deficits and reach maximum level of function.    Recent Surgery: Procedure(s) (LRB):  REVISION, ARTHROPLASTY, KNEE (Left) Day of Surgery    Pt is Oriented x3 and Alert  Cooperative      Plan:     During this hospitalization, patient to be seen BID to address the identified rehab impairments via gait training, therapeutic activities, therapeutic exercises, neuromuscular re-education and progress toward the following goals:    · Plan of Care Expires:  06/14/22    Subjective     Chief Complaint:   Patient/Family Comments/goals: "do everything"  Pain/Comfort:  ·      Patients cultural, spiritual, Buddhism conflicts given the current situation: no    Living Environment:  Pt lives with spouse, has 4 steps with L HR to enter house and no steps inside house.   Prior to admission, patients level of function was independent. Prior responsibilities include: driving and retired. Equipment used at home: walker, rolling, cane, straight.  DME owned (not currently used): none.  Upon discharge, patient will have assistance from .      Pt stated they performed HEP/attend prehab prior to sx: yes    Objective:     Communicated with RN prior to session.  Patient found supine with peripheral IV, blood pressure cuff  upon PT entry to " room.    General Precautions: Standard, fall   Orthopedic Precautions:Full weight bearing (no ROM L knee)   Braces: Knee immobilizer  Respiratory Status: Room air    Exams:  · Sensation: -       Intact  · RLE ROM: R knee immobilizer, no ROM  · RLE Strength: NT due to sx site  · LLE ROM: WFL  · LLE Strength: WFL      Functional Mobility:  · Bed Mobility:  Scooting: minimum assistance  · Supine to Sit: minimum assistance  · Transfers:  Sit to Stand:  minimum assistance with rolling walker  · Gait: 200' w RW and min A    Other tasks performed:   N/A      Patient left up in chair with all lines intact, call button in reach and  present.    GOALS:   Multidisciplinary Problems     Physical Therapy Goals        Problem: Physical Therapy    Goal Priority Disciplines Outcome Goal Variances Interventions   Physical Therapy Goal     PT, PT/OT Ongoing, Progressing     Description: Bed mobility: SBA  Sit to stand: SBA  Car Transfer: Min A  with rolling walker  Ambulation x 200'  feet with SBA and rolling walker  1 Step (Curb): Min A  and rolling walker  4 Steps: Min A  and L HR  Independent with total knee HEP                       History:     Past Medical History:   Diagnosis Date    Acid reflux     CLL (chronic lymphocytic leukemia)     Hypertension        Past Surgical History:   Procedure Laterality Date    CARPAL TUNNEL RELEASE      CYST REMOVAL      KNEE SURGERY      lumpectomy Left     bening breast lumpectomy    SHOULDER SURGERY      SINUS SURGERY      spinal injections      THYROID SURGERY      TONSILLECTOMY         Time Tracking:     PT Received On:    PT Start Time: 1537     PT Stop Time: 1600  PT Total Time (min): 23 min     Billable Minutes: Evaluation 23 06/08/2022

## 2022-06-08 NOTE — PLAN OF CARE
Problem: Physical Therapy  Goal: Physical Therapy Goal  Description: Bed mobility: SBA  Sit to stand: SBA  Car Transfer: Min A  with rolling walker  Ambulation x 200'  feet with SBA and rolling walker  1 Step (Curb): Min A  and rolling walker  4 Steps: Min A  and L HR  Independent with total knee HEP      Outcome: Ongoing, Progressing

## 2022-06-08 NOTE — OP NOTE
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   POSTOPERATIVE DIAGNOSIS: Failed left total knee arthroplasty.   PROCEDURES PERFORMED: Revision left total knee arthroplasty femoral and   tibial components.   Wound vac left knee  SURGEON: Sohan Bowens M.D.   ASSISTANT: Sivan Dorsey NP, [facility].  Certified first assist was necessary as a skilled set of hands and was necessary for proper patient positioning as well as assistance with closure in place with multiple implants.    SPECIMENS: Explanted component soft tissue for frozen section and cultures  FINDINGS:as expected   BLOOD LOSS: 82 mL.   IMPLANTS:     Implant Name Type Inv. Item Serial No.  Lot No. LRB No. Used Action   CEMENT BONE ANTIBIO SIMPLEX P - [id number]  CEMENT BONE ANTIBIO SIMPLEX P  [employer]. [id number] Left 2 Implanted   Stem 12mm 80mm fluted     [id number] Left 1 Implanted   femoral tapered junctional box 5 degree     [id number] Left 1 Implanted   femoral revision left size 2 non-porous     [id number] Left 1 Implanted   femoral 31mm M/L 22mm A/P sleeve     [id number] Left 1 Implanted   femoral locking bolt     [id number] Left 1 Implanted   stem 14mm 80mm slotted     [id number] Left 1 Implanted   tibial sleeve tray sz 3     [id number] Left 1 Implanted   tibial 35mm M/L 27mm A/P sleeve     [id number] Left 1 Implanted   tibial insert revision ck sz3 8mm     [id number] Left 1 Implanted        INDICATIONS: Amita Trejo is a 71-year-old female who underwent a previous   left total knee arthroplasty. She began having pain. Physical   examination and imaging studies were obtained. It was felt she had a arthrofibrosis.  Infection. workup was negative. After  discussion with the patient, it was decided to proceed with revision left  total knee arthroplasty. She is aware of reasonable treatment options as   well as risks and benefits.   PROCEDURE IN DETAIL: After appropriate consent was obtained, the patient   was  brought in the Operating Room and anesthesia was administered. She   received antibiotic prophylaxis. Cast padding and tourniquet applied.  left  lower extremity was prepped and draped in usual sterile fashion. her prior   incision was utilized. Incision was taken through the skin and   Retinacular layer. A medial parapatellar arthrotomy was performed.  Proximal medial tibial retinaculum released from tibial plateau.  Knee flexed up.  Poly was removed.  There was no sign of any infection. With the use of flexible osteotomes and the   femoral component was removed with minimal bone loss. Cement fragments were removed.  Osteotomes were   used to separate the tibial component from the cement mantle the tibial component was removed, with minimal bone loss. We removed all excess cement.  We then began reaming. We prepared the tibia first. We reamed to accomodate a 12 mm stem.. The   tray was sized and found to be a size 3. We broached for a sleeve up to a size 35. After sleeve was placed into position, we did a flat cut across the top of the tibial sleeve.  The tibial trial implant was   Placed.   Attention was then turned to the femur. We reamed the femur to a 14.   Re-establishing the joint line, we sized the femur to a 2. We began broaching and broached up to a 31 mm sleeve.  At this point was noted that we did have a medial femoral condyle avulsion.  There was minimal bone stock that would be adequate for repair.  Fracture itself was stable with minimal motion.  Using the broach, we  cut with our 4 in 1 cutting guide.  We then used the box cutting guide.  The femoral trial   was constructed and the femoral trial was placed. An 8 mm mm poly trial gave   excellent flexion-extension gap balance.  She achieved full extension. There was no   recurvatum. She easily had 110 degrees of flexion. The femoral component   ran symmetrically in the tibial tray and there was no liftoff. She also   had excellent varus valgus  stability.  The patella tracked very well.   Therefore, at this point, we were satisfied with knee range of motion and   stability, component position, sizing and alignment as well as patella   tracking. It should be noted there was no instability of full extension,   30 degrees of flexion, mid flexion or full flexion. The trial components   were removed. The bone was then prepared for cementing, pulsatile lavage   and drying. Components were cemented distally into place.  Meticulous care was taken to remove excess cement. The knee was inspected.   There was no loose body, foreign body or soft tissue interposition. The   knee was then reduced, brought into extension. The wound was then   copiously irrigated with antibiotic-impregnated solution/Betadine. Tourniquet let down and all bleeders coagulated.  The arthrotomy was then closed   with #1 Stratafix. Once the arthrotomy was closed, the knee was brought   through a range of motion and it was stable as previously described. There   was no instability and patella tracked well. Subcutaneous layer with 2-0 Monocryl, skin with staples.  Due to revision nature and poor wound healing capability, prevena wound vac applied to the incision to assist with wound closure and decrease infection risk.     The patient   was transferred from the operating room table to a stretcher, brought to   Recovery Room in stable condition, tolerated the procedure well, and there   were no known complications.

## 2022-06-08 NOTE — ANESTHESIA POSTPROCEDURE EVALUATION
Anesthesia Post Evaluation    Patient: Amita Trejo    Procedure(s) Performed: Procedure(s) (LRB):  REVISION, ARTHROPLASTY, KNEE (Left)          Patient location during evaluation: PACU  Post-procedure mental status: @ basline.  Post-procedure vital signs: reviewed and stable  Pain management: adequate      Anesthetic complications: no      Cardiovascular status: blood pressure returned to baseline  Respiratory status: @ baseline.  Hydration status: euvolemic            Vitals Value Taken Time   /68 06/08/22 1301   Temp 36 °C (96.8 °F) 06/08/22 1246   Pulse 70 06/08/22 1311   Resp 13 06/08/22 1259   SpO2 100 % 06/08/22 1311   Vitals shown include unvalidated device data.      Event Time   Out of Recovery 13:02:00         Pain/Luan Score: Pain Rating Prior to Med Admin: 0 (6/8/2022  7:53 AM)  Luan Score: 10 (6/8/2022 12:50 PM)

## 2022-06-08 NOTE — TRANSFER OF CARE
Anesthesia Transfer of Care Note    Patient: Amita Trejo    Procedure(s) Performed: Procedure(s) (LRB):  REVISION, ARTHROPLASTY, KNEE (Left)    Patient location: PACU    Anesthesia Type: spinal    Transport from OR: Transported from OR on room air with adequate spontaneous ventilation    Post pain: adequate analgesia    Post assessment: no apparent anesthetic complications    Post vital signs: stable    Level of consciousness: awake    Nausea/Vomiting: no nausea/vomiting    Complications: none    Transfer of care protocol was followed      Last vitals:   Visit Vitals  /63   Pulse 74   Temp 36 °C (96.8 °F)   Resp 17   Ht 5' (1.524 m)   Wt 84.1 kg (185 lb 6.5 oz)   SpO2 100%   Breastfeeding No   BMI 36.21 kg/m²

## 2022-06-09 LAB
ANION GAP SERPL CALC-SCNC: 11 MEQ/L
BUN SERPL-MCNC: 23.9 MG/DL (ref 9.8–20.1)
CALCIUM SERPL-MCNC: 8.9 MG/DL (ref 8.4–10.2)
CHLORIDE SERPL-SCNC: 101 MMOL/L (ref 98–107)
CO2 SERPL-SCNC: 24 MMOL/L (ref 23–31)
CREAT SERPL-MCNC: 0.92 MG/DL (ref 0.55–1.02)
CREAT/UREA NIT SERPL: 26
ERYTHROCYTE [DISTWIDTH] IN BLOOD BY AUTOMATED COUNT: 13.8 % (ref 11.5–17)
GLUCOSE SERPL-MCNC: 128 MG/DL (ref 82–115)
GRAM STN SPEC: NORMAL
HCT VFR BLD AUTO: 28.9 % (ref 37–47)
HGB BLD-MCNC: 9.5 GM/DL (ref 12–16)
MCH RBC QN AUTO: 29.9 PG (ref 27–31)
MCHC RBC AUTO-ENTMCNC: 32.9 MG/DL (ref 33–36)
MCV RBC AUTO: 90.9 FL (ref 80–94)
NRBC BLD AUTO-RTO: 0 %
PLATELET # BLD AUTO: 182 X10(3)/MCL (ref 130–400)
PMV BLD AUTO: 9.6 FL (ref 9.4–12.4)
POTASSIUM SERPL-SCNC: 4.1 MMOL/L (ref 3.5–5.1)
RBC # BLD AUTO: 3.18 X10(6)/MCL (ref 4.2–5.4)
SODIUM SERPL-SCNC: 136 MMOL/L (ref 136–145)
WBC # SPEC AUTO: 17.5 X10(3)/MCL (ref 4.5–11.5)

## 2022-06-09 PROCEDURE — 97116 GAIT TRAINING THERAPY: CPT | Mod: CQ

## 2022-06-09 PROCEDURE — 94799 UNLISTED PULMONARY SVC/PX: CPT

## 2022-06-09 PROCEDURE — 80048 BASIC METABOLIC PNL TOTAL CA: CPT | Performed by: ORTHOPAEDIC SURGERY

## 2022-06-09 PROCEDURE — 85027 COMPLETE CBC AUTOMATED: CPT | Performed by: ORTHOPAEDIC SURGERY

## 2022-06-09 PROCEDURE — 25000003 PHARM REV CODE 250: Performed by: ORTHOPAEDIC SURGERY

## 2022-06-09 PROCEDURE — 97110 THERAPEUTIC EXERCISES: CPT

## 2022-06-09 PROCEDURE — 36415 COLL VENOUS BLD VENIPUNCTURE: CPT | Performed by: ORTHOPAEDIC SURGERY

## 2022-06-09 PROCEDURE — 63600175 PHARM REV CODE 636 W HCPCS: Performed by: ORTHOPAEDIC SURGERY

## 2022-06-09 PROCEDURE — 63700000 PHARM REV CODE 250 ALT 637 W/O HCPCS: Performed by: ORTHOPAEDIC SURGERY

## 2022-06-09 PROCEDURE — 25000003 PHARM REV CODE 250: Performed by: NURSE PRACTITIONER

## 2022-06-09 PROCEDURE — 94761 N-INVAS EAR/PLS OXIMETRY MLT: CPT

## 2022-06-09 PROCEDURE — 11000001 HC ACUTE MED/SURG PRIVATE ROOM

## 2022-06-09 RX ORDER — GABAPENTIN 100 MG/1
100 CAPSULE ORAL DAILY
Status: DISCONTINUED | OUTPATIENT
Start: 2022-06-09 | End: 2022-06-10

## 2022-06-09 RX ADMIN — SENNOSIDES AND DOCUSATE SODIUM 2 TABLET: 50; 8.6 TABLET ORAL at 08:06

## 2022-06-09 RX ADMIN — HYDROCODONE BITARTRATE AND ACETAMINOPHEN 1 TABLET: 5; 325 TABLET ORAL at 05:06

## 2022-06-09 RX ADMIN — METOPROLOL TARTRATE 75 MG: 50 TABLET, FILM COATED ORAL at 08:06

## 2022-06-09 RX ADMIN — CEFAZOLIN SODIUM 2 G: 2 SOLUTION INTRAVENOUS at 04:06

## 2022-06-09 RX ADMIN — METOCLOPRAMIDE 10 MG: 5 INJECTION, SOLUTION INTRAMUSCULAR; INTRAVENOUS at 04:06

## 2022-06-09 RX ADMIN — ASPIRIN 81 MG CHEWABLE TABLET 81 MG: 81 TABLET CHEWABLE at 08:06

## 2022-06-09 RX ADMIN — HYDROCODONE BITARTRATE AND ACETAMINOPHEN 1 TABLET: 5; 325 TABLET ORAL at 01:06

## 2022-06-09 RX ADMIN — GABAPENTIN 100 MG: 100 CAPSULE ORAL at 01:06

## 2022-06-09 RX ADMIN — DOCUSATE SODIUM 200 MG: 100 CAPSULE, LIQUID FILLED ORAL at 05:06

## 2022-06-09 RX ADMIN — HYDROCODONE BITARTRATE AND ACETAMINOPHEN 1 TABLET: 5; 325 TABLET ORAL at 09:06

## 2022-06-09 RX ADMIN — AMLODIPINE BESYLATE 5 MG: 5 TABLET ORAL at 09:06

## 2022-06-09 RX ADMIN — HYDROCODONE BITARTRATE AND ACETAMINOPHEN 1 TABLET: 5; 325 TABLET ORAL at 10:06

## 2022-06-09 RX ADMIN — POLYETHYLENE GLYCOL 3350 17 G: 17 POWDER, FOR SOLUTION ORAL at 08:06

## 2022-06-09 RX ADMIN — FAMOTIDINE 20 MG: 20 TABLET ORAL at 08:06

## 2022-06-09 RX ADMIN — HYDROCODONE BITARTRATE AND ACETAMINOPHEN 1 TABLET: 5; 325 TABLET ORAL at 12:06

## 2022-06-09 RX ADMIN — GABAPENTIN 300 MG: 300 CAPSULE ORAL at 08:06

## 2022-06-09 RX ADMIN — METOCLOPRAMIDE 10 MG: 5 INJECTION, SOLUTION INTRAMUSCULAR; INTRAVENOUS at 10:06

## 2022-06-09 RX ADMIN — TELMISARTAN AND HYDROCHLOROTHIAZIDE 1 TABLET: 40; 12.5 TABLET ORAL at 09:06

## 2022-06-09 RX ADMIN — METHOCARBAMOL 750 MG: 750 TABLET ORAL at 06:06

## 2022-06-09 RX ADMIN — METHOCARBAMOL 750 MG: 750 TABLET ORAL at 12:06

## 2022-06-09 NOTE — PROGRESS NOTES
[facility] - Orthopaedics  Orthopedics  Progress Note    Patient Name: Amita Trejo  MRN: [id number]  Admission Date: 6/8/2022  Hospital Length of Stay: 1 days  Attending Provider: Sohan Bowens MD  Primary Care Provider: Tyesha Verma MD  Follow-up For: Procedure(s) (LRB):  REVISION, ARTHROPLASTY, KNEE (Left)    Post-Operative Day: 1 Day Post-Op  Subjective:     Principal Problem:Arthrofibrosis of total knee arthroplasty, sequela    Principal Orthopedic Problem: 1 Day Post-Op     Interval History: Pt laying in bed AA in NAD; ACACIA o/n reported; pain well controlled; knee immobilizer in place; up with PT yesterday    Review of patient's allergies indicates:   Allergen Reactions    Duloxetine      Other reaction(s): Bradycardia, Hypotension, Vomit    Lorazepam      Other reaction(s): Confusion  amnesia         Current Facility-Administered Medications   Medication    0.9%  NaCl infusion    0.9%  NaCl infusion    albuterol-ipratropium 2.5 mg-0.5 mg/3 mL nebulizer solution 3 mL    aluminum-magnesium hydroxide-simethicone 200-200-20 mg/5 mL suspension 30 mL    amLODIPine tablet 5 mg    aspirin chewable tablet 81 mg    [START ON 6/11/2022] bisacodyL suppository 10 mg    calcium carbonate 200 mg calcium (500 mg) chewable tablet 500 mg    cefazolin (ANCEF) 2 gram in dextrose 5% 50 mL IVPB (premix)    docusate sodium capsule 200 mg    famotidine tablet 20 mg    gabapentin capsule 300 mg    HYDROcodone-acetaminophen 5-325 mg per tablet 1 tablet    lactulose 20 gram/30 mL solution Soln 20 g    melatonin tablet 6 mg    methocarbamoL tablet 750 mg    metoclopramide HCl injection 10 mg    metoprolol tartrate tablet 75 mg    morphine injection 4 mg    ondansetron injection 4 mg    polyethylene glycol packet 17 g    scopolamine 1.3-1.5 mg (1 mg over 3 days) 1 patch    senna-docusate 8.6-50 mg per tablet 2 tablet    telmisartan-hydrochlorothiazide 40-12.5 mg per tablet 1 tablet     traMADoL tablet 50 mg     Objective:     Vital Signs (Most Recent):  Temp: 97.8 °F (36.6 °C) (06/09/22 0734)  Pulse: 76 (06/09/22 0734)  Resp: 14 (06/09/22 0549)  BP: 118/63 (06/09/22 0734)  SpO2: (!) 94 % (06/09/22 0734) Vital Signs (24h Range):  Temp:  [96.8 °F (36 °C)-97.8 °F (36.6 °C)] 97.8 °F (36.6 °C)  Pulse:  [64-89] 76  Resp:  [11-19] 14  SpO2:  [87 %-100 %] 94 %  BP: (113-151)/(62-83) 118/63     Weight: 84.1 kg (185 lb 6.5 oz)  Height: 5' (152.4 cm)  Body mass index is 36.21 kg/m².      Intake/Output Summary (Last 24 hours) at 6/9/2022 0832  Last data filed at 6/9/2022 0649  Gross per 24 hour   Intake 2000 ml   Output 725 ml   Net 1275 ml       Physical Exam:   Ortho/SPM Exam  Musculoskeletal:      LLE:  Dressing c/d/i  WV intact with good seal; bloody drainage   BCR distally  SILT distally  +EHL/FHL  Palpable pedal pulse    Diagnostic Findings:   Significant Labs: All pertinent labs within the past 24 hours have been reviewed.  Recent Lab Results       06/09/22  0514   06/08/22  1229   06/08/22  1024        Aerobic Culture - Tissue     No Growth At 24 Hours  [P]       Anion Gap 11.0           BUN 23.9           BUN/CREAT RATIO 26           Calcium 8.9           Chloride 101           CO2 24           Creatinine 0.92           eGFR if non  >60           Glucose 128           Hematocrit 28.9   37.1         Hemoglobin 9.5   11.8         MCH 29.9           MCHC 32.9           MCV 90.9           MPV 9.6           nRBC 0.0           Platelets 182           Potassium 4.1           RBC 3.18           RDW 13.8           Sodium 136           WBC 17.5                  [P] - Preliminary Result              Significant Imaging: I have reviewed all pertinent imaging results/findings.     Assessment/Plan:     Active Diagnoses:    Diagnosis Date Noted POA    PRINCIPAL PROBLEM:  Arthrofibrosis of total knee arthroplasty, sequela [T84.82XS] 05/27/2022 Not Applicable    Failed total knee arthroplasty  [T84.018A, Z96.659] 06/08/2022 Not Applicable      Problems Resolved During this Admission:       OOB with PT  WBAT to RLE with knee immobilizer in place  Continue current pain regimen  DVT PPx with ASA     LIZZIE Staton  Orthopedic Surgery  [facility] - Orthopaedics

## 2022-06-09 NOTE — PLAN OF CARE
S/p TKR. Spk w pt &  ... hsb to asst w homecare. Pt has RW. Provider list given. Foc obtained.   Called referral for outpatient therapy to [facility]. They will contact pt with appt date & time.   PCP: Tyesha Verma  Rx:

## 2022-06-09 NOTE — PLAN OF CARE
Problem: Adult Inpatient Plan of Care  Goal: Plan of Care Review  Outcome: Ongoing, Progressing  Goal: Patient-Specific Goal (Individualized)  Outcome: Ongoing, Progressing  Goal: Absence of Hospital-Acquired Illness or Injury  Outcome: Ongoing, Progressing  Goal: Optimal Comfort and Wellbeing  Outcome: Ongoing, Progressing  Goal: Readiness for Transition of Care  Outcome: Ongoing, Progressing     Problem: Infection  Goal: Absence of Infection Signs and Symptoms  Outcome: Ongoing, Progressing     Problem: Hypertension Comorbidity  Goal: Blood Pressure in Desired Range  Outcome: Ongoing, Progressing     Problem: Adjustment to Surgery (Knee Arthroplasty)  Goal: Optimal Coping  Outcome: Ongoing, Progressing     Problem: Bleeding (Knee Arthroplasty)  Goal: Absence of Bleeding  Outcome: Ongoing, Progressing     Problem: Bowel Motility Impaired (Knee Arthroplasty)  Goal: Effective Bowel Elimination  Outcome: Ongoing, Progressing     Problem: Fluid and Electrolyte Imbalance (Knee Arthroplasty)  Goal: Fluid and Electrolyte Balance  Outcome: Ongoing, Progressing     Problem: Functional Ability Impaired (Knee Arthroplasty)  Goal: Optimal Functional Ability  Outcome: Ongoing, Progressing     Problem: Infection (Knee Arthroplasty)  Goal: Absence of Infection Signs and Symptoms  Outcome: Ongoing, Progressing     Problem: Neurovascular Compromise (Knee Arthroplasty)  Goal: Intact Neurovascular Status  Outcome: Ongoing, Progressing     Problem: Ongoing Anesthesia Effects (Knee Arthroplasty)  Goal: Anesthesia/Sedation Recovery  Outcome: Ongoing, Progressing     Problem: Pain (Knee Arthroplasty)  Goal: Acceptable Pain Control  Outcome: Ongoing, Progressing     Problem: Postoperative Nausea and Vomiting (Knee Arthroplasty)  Goal: Nausea and Vomiting Relief  Outcome: Ongoing, Progressing     Problem: Postoperative Urinary Retention (Knee Arthroplasty)  Goal: Effective Urinary Elimination  Outcome: Ongoing, Progressing     Problem:  Respiratory Compromise (Knee Arthroplasty)  Goal: Effective Oxygenation and Ventilation  Outcome: Ongoing, Progressing     Problem: Pain Acute  Goal: Acceptable Pain Control and Functional Ability  Outcome: Ongoing, Progressing     Problem: Fall Injury Risk  Goal: Absence of Fall and Fall-Related Injury  Outcome: Ongoing, Progressing

## 2022-06-09 NOTE — PT/OT/SLP PROGRESS
"Physical Therapy Treatment    Patient Name:  Amita Trejo   MRN:  [id number]    Recommendations:     Discharge Recommendations:  outpatient PT   Discharge Equipment Recommendations: walker, rolling   Barriers to discharge: None    Assessment:     Amita Trejo is a 71 y.o. female admitted with a medical diagnosis of Arthrofibrosis of total knee arthroplasty, sequela.  She presents with the following impairments/functional limitations:  orthopedic precautions, impaired functional mobilty, decreased ROM.    Rehab Prognosis: Good; patient would benefit from acute skilled PT services to address these deficits and reach maximum level of function.    Recent Surgery: Procedure(s) (LRB):  REVISION, ARTHROPLASTY, KNEE (Left) 1 Day Post-Op    Plan:     During this hospitalization, patient to be seen BID to address the identified rehab impairments via gait training, therapeutic activities, therapeutic exercises, neuromuscular re-education and progress toward the following goals:    · Plan of Care Expires:  06/14/22    Subjective     Chief Complaint: Some difficulties sleeping last night d/t restless leg.  Pain/Comfort:  Pain Rating 1: 0/10      Objective:     Communicated with NSG prior to session.  Patient found up in chair with peripheral IV, wound vac upon PT entry to room.     General Precautions: Standard, fall   Orthopedic Precautions:Full weight bearing (No ROM LLE)   Braces: Knee immobilizer  Respiratory Status: Room air     Functional Mobility:  · Transfers:     · Sit to Stand:  modified independence with rolling walker  · Car Transfer: supervision with  rolling walker  using  Step Transfer  · Gait: 200ft with step thru gt pattern.  No LOB or unsteadiness noted throughout ambulation.  Pt overall [place] with ambulation.  · Stairs:  Pt ascended/descended 3 stair(s) and 4" curb step with Rolling Walker with left handrail with Stand-by Assistance. Pt ascended stairs with lateral side step d/t home environment.    Therapeutic " Activities and Exercises:   Performed HEP per protocol.  No ROM performed.  QS, GS, SLR, Hip abd.    Patient left up in chair with all lines intact and call button in reach..    GOALS:   Multidisciplinary Problems     Physical Therapy Goals        Problem: Physical Therapy    Goal Priority Disciplines Outcome Goal Variances Interventions   Physical Therapy Goal     PT, PT/OT Ongoing, Progressing     Description: Bed mobility: SBA  (MET)Sit to stand: SBA  (MET)Car Transfer: Min A  with rolling walker  (MET)Ambulation x 200'  feet with SBA and rolling walker  (MET)1 Step (Curb): Min A  and rolling walker  (MET)4 Steps: Min A  and L HR  Independent with total knee HEP                       Time Tracking:     PT Received On: 06/09/22  PT Start Time: 1004     PT Stop Time: 1031  PT Total Time (min): 27 min     Billable Minutes: Gait Training 19 and Therapeutic Exercise 8    Treatment Type: Treatment  PT/PTA: PTA           06/09/2022

## 2022-06-09 NOTE — PT/OT/SLP PROGRESS
Physical Therapy Treatment    Patient Name:  Amita Trejo   MRN:  [id number]    Recommendations:     Discharge Recommendations:  outpatient PT   Discharge Equipment Recommendations: walker, rolling   Barriers to discharge: None    Assessment:     Amita Trejo is a 71 y.o. female admitted with a medical diagnosis of Arthrofibrosis of total knee arthroplasty, sequela.  She presents with the following impairments/functional limitations:  decreased safety awareness, impaired muscle length, orthopedic precautions, pain, impaired functional mobilty, impaired balance, weakness, decreased ROM, gait instability.    Rehab Prognosis: Good; patient would benefit from acute skilled PT services to address these deficits and reach maximum level of function.    Recent Surgery: Procedure(s) (LRB):  REVISION, ARTHROPLASTY, KNEE (Left) 1 Day Post-Op    Plan:     During this hospitalization, patient to be seen BID to address the identified rehab impairments via gait training, therapeutic activities, neuromuscular re-education, therapeutic exercises and progress toward the following goals:    · Plan of Care Expires:  06/14/22    Subjective     Chief Complaint:   Patient/Family Comments/goals:   Pain/Comfort:  · Pain Rating 1: 0/10      Objective:     Communicated with RN prior to session.  Patient found supine with wound vac, peripheral IV upon PT entry to room.     General Precautions: Standard, fall   Orthopedic Precautions:Full weight bearing   Braces: Knee immobilizer  Respiratory Status: Room air     Functional Mobility:  · Bed Mobility:     · Supine to Sit: stand by assistance  · Transfers:  Sit to Stand:  supervision with rolling walker  · Gait: 300' with RW and SBA    Therapeutic Activities and Exercises:  2x10 each: glutes sets, quad sets, hip abudction, SLR deferred 2/2 inability to maintain proper quad contraction    Patient left up in chair with all lines intact and  present. Pt and spouse educated on safety  awareness, HEP, mobility, and proper hydration to reduce risk of medical complications upon d/c home. All questions/concerns addressed.    GOALS:   Multidisciplinary Problems     Physical Therapy Goals        Problem: Physical Therapy    Goal Priority Disciplines Outcome Goal Variances Interventions   Physical Therapy Goal     PT, PT/OT Ongoing, Progressing     Description: Bed mobility: SBA  (MET) Sit to stand: SBA  (Revised) Car Transfer: Mod I  with rolling walker  (Revised) Ambulation x 400' feet with mod I and rolling walker  (MET) 1 Step (Curb): Min A  and rolling walker  (MET) 4 Steps: Min A  and L HR  Independent with total knee HEP                       Time Tracking:     PT Received On:    PT Start Time: 1516     PT Stop Time: 1534  PT Total Time (min): 18 min     Billable Minutes: Gait Training 6 and Therapeutic Exercise 12    Treatment Type: Treatment  PT/PTA: PT           06/09/2022

## 2022-06-09 NOTE — PLAN OF CARE
Problem: Physical Therapy  Goal: Physical Therapy Goal  Description: Bed mobility: SBA  (MET) Sit to stand: SBA  (Revised) Car Transfer: Mod I  with rolling walker  (Revised) Ambulation x 400' feet with mod I and rolling walker  (MET) 1 Step (Curb): Min A  and rolling walker  (MET) 4 Steps: Min A  and L HR  Independent with total knee HEP      Outcome: Ongoing, Progressing

## 2022-06-10 VITALS
TEMPERATURE: 98 F | HEIGHT: 60 IN | BODY MASS INDEX: 36.4 KG/M2 | RESPIRATION RATE: 18 BRPM | SYSTOLIC BLOOD PRESSURE: 114 MMHG | WEIGHT: 185.44 LBS | OXYGEN SATURATION: 93 % | HEART RATE: 80 BPM | DIASTOLIC BLOOD PRESSURE: 68 MMHG

## 2022-06-10 LAB
ANION GAP SERPL CALC-SCNC: 8 MEQ/L
BUN SERPL-MCNC: 25.5 MG/DL (ref 9.8–20.1)
CALCIUM SERPL-MCNC: 8.8 MG/DL (ref 8.4–10.2)
CHLORIDE SERPL-SCNC: 104 MMOL/L (ref 98–107)
CO2 SERPL-SCNC: 25 MMOL/L (ref 23–31)
CREAT SERPL-MCNC: 0.79 MG/DL (ref 0.55–1.02)
CREAT/UREA NIT SERPL: 32
ERYTHROCYTE [DISTWIDTH] IN BLOOD BY AUTOMATED COUNT: 14.4 % (ref 11.5–17)
GLUCOSE SERPL-MCNC: 108 MG/DL (ref 82–115)
HCT VFR BLD AUTO: 27.3 % (ref 37–47)
HGB BLD-MCNC: 9.1 GM/DL (ref 12–16)
MCH RBC QN AUTO: 30.6 PG (ref 27–31)
MCHC RBC AUTO-ENTMCNC: 33.3 MG/DL (ref 33–36)
MCV RBC AUTO: 91.9 FL (ref 80–94)
NRBC BLD AUTO-RTO: 0 %
PLATELET # BLD AUTO: 124 X10(3)/MCL (ref 130–400)
PMV BLD AUTO: 9.3 FL (ref 9.4–12.4)
POTASSIUM SERPL-SCNC: 3.7 MMOL/L (ref 3.5–5.1)
RBC # BLD AUTO: 2.97 X10(6)/MCL (ref 4.2–5.4)
SODIUM SERPL-SCNC: 137 MMOL/L (ref 136–145)
WBC # SPEC AUTO: 6.7 X10(3)/MCL (ref 4.5–11.5)

## 2022-06-10 PROCEDURE — 85027 COMPLETE CBC AUTOMATED: CPT | Performed by: ORTHOPAEDIC SURGERY

## 2022-06-10 PROCEDURE — 63600175 PHARM REV CODE 636 W HCPCS: Performed by: NURSE PRACTITIONER

## 2022-06-10 PROCEDURE — 94799 UNLISTED PULMONARY SVC/PX: CPT

## 2022-06-10 PROCEDURE — 80048 BASIC METABOLIC PNL TOTAL CA: CPT | Performed by: ORTHOPAEDIC SURGERY

## 2022-06-10 PROCEDURE — 97116 GAIT TRAINING THERAPY: CPT

## 2022-06-10 PROCEDURE — 94761 N-INVAS EAR/PLS OXIMETRY MLT: CPT

## 2022-06-10 PROCEDURE — 25000003 PHARM REV CODE 250: Performed by: NURSE PRACTITIONER

## 2022-06-10 PROCEDURE — 97530 THERAPEUTIC ACTIVITIES: CPT

## 2022-06-10 PROCEDURE — 25000003 PHARM REV CODE 250: Performed by: ORTHOPAEDIC SURGERY

## 2022-06-10 PROCEDURE — 63700000 PHARM REV CODE 250 ALT 637 W/O HCPCS: Performed by: ORTHOPAEDIC SURGERY

## 2022-06-10 PROCEDURE — 36415 COLL VENOUS BLD VENIPUNCTURE: CPT | Performed by: ORTHOPAEDIC SURGERY

## 2022-06-10 PROCEDURE — 63600175 PHARM REV CODE 636 W HCPCS: Performed by: ORTHOPAEDIC SURGERY

## 2022-06-10 RX ORDER — AMLODIPINE BESYLATE 5 MG/1
5 TABLET ORAL NIGHTLY
Status: DISCONTINUED | OUTPATIENT
Start: 2022-06-10 | End: 2022-06-10 | Stop reason: HOSPADM

## 2022-06-10 RX ORDER — TRAMADOL HYDROCHLORIDE 50 MG/1
50 TABLET ORAL EVERY 4 HOURS PRN
Status: DISCONTINUED | OUTPATIENT
Start: 2022-06-10 | End: 2022-06-10 | Stop reason: HOSPADM

## 2022-06-10 RX ORDER — AMLODIPINE BESYLATE 5 MG/1
5 TABLET ORAL NIGHTLY
Start: 2022-06-10

## 2022-06-10 RX ORDER — SODIUM CHLORIDE, SODIUM LACTATE, POTASSIUM CHLORIDE, CALCIUM CHLORIDE 600; 310; 30; 20 MG/100ML; MG/100ML; MG/100ML; MG/100ML
INJECTION, SOLUTION INTRAVENOUS CONTINUOUS
Status: DISCONTINUED | OUTPATIENT
Start: 2022-06-10 | End: 2022-06-10 | Stop reason: HOSPADM

## 2022-06-10 RX ORDER — TRAMADOL HYDROCHLORIDE 50 MG/1
50 TABLET ORAL EVERY 4 HOURS PRN
Qty: 42 TABLET | Refills: 0 | Status: SHIPPED | OUTPATIENT
Start: 2022-06-10 | End: 2022-06-17

## 2022-06-10 RX ORDER — MELOXICAM 7.5 MG/1
7.5 TABLET ORAL DAILY
Status: DISCONTINUED | OUTPATIENT
Start: 2022-06-10 | End: 2022-06-10 | Stop reason: HOSPADM

## 2022-06-10 RX ORDER — AMOXICILLIN 250 MG
2 CAPSULE ORAL 2 TIMES DAILY
Qty: 56 TABLET | Refills: 0 | Status: SHIPPED | OUTPATIENT
Start: 2022-06-10 | End: 2022-06-24

## 2022-06-10 RX ORDER — ASPIRIN 81 MG/1
81 TABLET ORAL EVERY 12 HOURS
Qty: 84 TABLET | Refills: 0 | Status: SHIPPED | OUTPATIENT
Start: 2022-06-10 | End: 2023-05-02

## 2022-06-10 RX ORDER — METHOCARBAMOL 750 MG/1
750 TABLET, FILM COATED ORAL EVERY 6 HOURS
Qty: 56 TABLET | Refills: 0 | Status: SHIPPED | OUTPATIENT
Start: 2022-06-10 | End: 2022-06-24

## 2022-06-10 RX ORDER — DOXYCYCLINE 100 MG/1
100 CAPSULE ORAL EVERY 12 HOURS
Qty: 28 CAPSULE | Refills: 0 | Status: SHIPPED | OUTPATIENT
Start: 2022-06-10 | End: 2022-06-24

## 2022-06-10 RX ORDER — MELOXICAM 7.5 MG/1
7.5 TABLET ORAL DAILY
Qty: 20 TABLET | Refills: 0 | Status: SHIPPED | OUTPATIENT
Start: 2022-06-10 | End: 2022-06-20

## 2022-06-10 RX ORDER — ACETAMINOPHEN 500 MG
500 TABLET ORAL EVERY 4 HOURS
Qty: 84 TABLET | Refills: 0 | Status: SHIPPED | OUTPATIENT
Start: 2022-06-10 | End: 2022-06-24

## 2022-06-10 RX ORDER — ACETAMINOPHEN 500 MG
500 TABLET ORAL
Status: DISCONTINUED | OUTPATIENT
Start: 2022-06-10 | End: 2022-06-10 | Stop reason: HOSPADM

## 2022-06-10 RX ADMIN — SODIUM CHLORIDE, POTASSIUM CHLORIDE, SODIUM LACTATE AND CALCIUM CHLORIDE: 600; 310; 30; 20 INJECTION, SOLUTION INTRAVENOUS at 12:06

## 2022-06-10 RX ADMIN — ONDANSETRON 4 MG: 2 INJECTION INTRAMUSCULAR; INTRAVENOUS at 12:06

## 2022-06-10 RX ADMIN — TELMISARTAN AND HYDROCHLOROTHIAZIDE 1 TABLET: 40; 12.5 TABLET ORAL at 08:06

## 2022-06-10 RX ADMIN — ACETAMINOPHEN 500 MG: 500 TABLET ORAL at 12:06

## 2022-06-10 RX ADMIN — DOCUSATE SODIUM 200 MG: 100 CAPSULE, LIQUID FILLED ORAL at 09:06

## 2022-06-10 RX ADMIN — MELOXICAM 7.5 MG: 7.5 TABLET ORAL at 12:06

## 2022-06-10 RX ADMIN — TRAMADOL HYDROCHLORIDE 50 MG: 50 TABLET, COATED ORAL at 08:06

## 2022-06-10 RX ADMIN — SENNOSIDES AND DOCUSATE SODIUM 2 TABLET: 50; 8.6 TABLET ORAL at 08:06

## 2022-06-10 RX ADMIN — ASPIRIN 81 MG CHEWABLE TABLET 81 MG: 81 TABLET CHEWABLE at 08:06

## 2022-06-10 RX ADMIN — FAMOTIDINE 20 MG: 20 TABLET ORAL at 08:06

## 2022-06-10 RX ADMIN — METOPROLOL TARTRATE 75 MG: 50 TABLET, FILM COATED ORAL at 08:06

## 2022-06-10 NOTE — NURSING
Patient discharged home with no acute distress noted. DC instructions,f/u and rx info given. Patient instructed to Notify /pcp with any questions or concerns or report to ER with any emergencies. All questions answered. Patient verbalized understanding.

## 2022-06-10 NOTE — PLAN OF CARE
Problem: Adult Inpatient Plan of Care  Goal: Plan of Care Review  Outcome: Ongoing, Progressing  Goal: Patient-Specific Goal (Individualized)  Outcome: Ongoing, Progressing  Goal: Absence of Hospital-Acquired Illness or Injury  Outcome: Ongoing, Progressing  Goal: Optimal Comfort and Wellbeing  Outcome: Ongoing, Progressing  Goal: Readiness for Transition of Care  Outcome: Ongoing, Progressing     Problem: Infection  Goal: Absence of Infection Signs and Symptoms  Outcome: Ongoing, Progressing     Problem: Hypertension Comorbidity  Goal: Blood Pressure in Desired Range  Outcome: Ongoing, Progressing     Problem: Adjustment to Surgery (Knee Arthroplasty)  Goal: Optimal Coping  Outcome: Ongoing, Progressing     Problem: Adjustment to Surgery (Knee Arthroplasty)  Goal: Optimal Coping  Outcome: Ongoing, Progressing     Problem: Bleeding (Knee Arthroplasty)  Goal: Absence of Bleeding  Outcome: Ongoing, Progressing     Problem: Bowel Motility Impaired (Knee Arthroplasty)  Goal: Effective Bowel Elimination  Outcome: Ongoing, Progressing     Problem: Functional Ability Impaired (Knee Arthroplasty)  Goal: Optimal Functional Ability  Outcome: Ongoing, Progressing     Problem: Fluid and Electrolyte Imbalance (Knee Arthroplasty)  Goal: Fluid and Electrolyte Balance  Outcome: Ongoing, Progressing     Problem: Infection (Knee Arthroplasty)  Goal: Absence of Infection Signs and Symptoms  Outcome: Ongoing, Progressing     Problem: Ongoing Anesthesia Effects (Knee Arthroplasty)  Goal: Anesthesia/Sedation Recovery  Outcome: Ongoing, Progressing     Problem: Pain (Knee Arthroplasty)  Goal: Acceptable Pain Control  Outcome: Ongoing, Progressing     Problem: Postoperative Nausea and Vomiting (Knee Arthroplasty)  Goal: Nausea and Vomiting Relief  Outcome: Ongoing, Progressing     Problem: Postoperative Urinary Retention (Knee Arthroplasty)  Goal: Effective Urinary Elimination  Outcome: Ongoing, Progressing     Problem: Respiratory  Compromise (Knee Arthroplasty)  Goal: Effective Oxygenation and Ventilation  Outcome: Ongoing, Progressing     Problem: Fall Injury Risk  Goal: Absence of Fall and Fall-Related Injury  Outcome: Ongoing, Progressing

## 2022-06-10 NOTE — PT/OT/SLP PROGRESS
Physical Therapy Treatment    Patient Name:  Amita Trejo   MRN:  [id number]    Recommendations:     Discharge Recommendations:  home, outpatient PT   Discharge Equipment Recommendations: walker, rolling   Barriers to discharge: None    Assessment:     Amita Trejo is a 71 y.o. female admitted with a medical diagnosis of Arthrofibrosis of total knee arthroplasty, sequela.  She presents with the following impairments/functional limitations:    knee stiffness.    Rehab Prognosis: Good; patient would benefit from acute skilled PT services to address these deficits and reach maximum level of function.    Recent Surgery: Procedure(s) (LRB):  REVISION, ARTHROPLASTY, KNEE (Left) 2 Days Post-Op    Plan:     During this hospitalization, patient to be seen BID to address the identified rehab impairments via gait training, therapeutic activities, therapeutic exercises and progress toward the following goals:    · Plan of Care Expires:  06/14/22    Subjective     Chief Complaint: knee discomfort/stiffness  Patient/Family Comments/goals: to go home  Pain/Comfort:  ·        Objective:     Communicated with RN prior to session.  Patient found supine with  present upon PT entry to room.     General Precautions: Standard, fall   Orthopedic Precautions: (No ROM)   Braces: Knee immobilizer  Respiratory Status: Room air     Functional Mobility:  · Bed Mobility:     · Supine to Sit: stand by assistance, using leg , several trials  · Sit to Supine: stand by assistance, using leg , several trials  · Transfers:     · Sit to Stand:  stand by assistance with rolling walker  · Bed to Chair: stand by assistance with  rolling walker  using  Step Transfer  · Gait: 225ft with RW    Therapeutic Activities and Exercises:   Verbally reviewed safety/technique for stairs and car transfer. Also verbally went over HEP and recommendations for ambulation at home.     Patient left supine with call button in will, MOUNIKA Constantino and Nelly  NP notified and  present..    GOALS:   Multidisciplinary Problems     Physical Therapy Goals     Not on file          Multidisciplinary Problems (Resolved)        Problem: Physical Therapy    Goal Priority Disciplines Outcome Goal Variances Interventions   Physical Therapy Goal   (Resolved)     PT, PT/OT Met     Description: (MET)Bed mobility: SBA  (MET) Sit to stand: SBA  (MET) Car Transfer: Mod I  with rolling walker  (Not met) Ambulation x 400' feet with mod I and rolling walker  (MET) 1 Step (Curb): Min A  and rolling walker  (MET) 4 Steps: Min A  and L HR  Independent with total knee HEP                       Time Tracking:     PT Received On:    PT Start Time: 1446     PT Stop Time: 1507  PT Total Time (min): 21 min     Billable Minutes: Therapeutic Activity 21     Additional Info:  Pt seen for PT Last Visit. Answered all last questions/concerns as appropriate. Pt is ready for DC to home today. This note to serve as DC Summary Note.     Treatment Type: Treatment  PT/PTA: PT     PTA Visit Number: 0     06/10/2022

## 2022-06-10 NOTE — PT/OT/SLP PROGRESS
Physical Therapy Treatment    Patient Name:  Amita Trejo   MRN:  [id number]    Recommendations:     Discharge Recommendations:  outpatient PT   Discharge Equipment Recommendations: walker, rolling   Barriers to discharge: None    Assessment:     Amita Trejo is a 71 y.o. female admitted with a medical diagnosis of Arthrofibrosis of total knee arthroplasty, sequela.  She presents with the following impairments/functional limitations:  gait instability, weakness, impaired endurance, pain .    Rehab Prognosis: Good; patient would benefit from acute skilled PT services to address these deficits and reach maximum level of function.    Recent Surgery: Procedure(s) (LRB):  REVISION, ARTHROPLASTY, KNEE (Left) 2 Days Post-Op    Plan:     During this hospitalization, patient to be seen BID to address the identified rehab impairments via gait training, therapeutic activities, neuromuscular re-education, therapeutic exercises and progress toward the following goals:    · Plan of Care Expires:  06/14/22    Subjective     Chief Complaint: I Havent eaten today   Patient/Family Comments/goals: Im weaker today than yesterday   Pain/Comfort:  · Pain Rating 1: 1/10  · Location - Side 1: Left  · Location 1: knee  · Pain Addressed 1: Pre-medicate for activity, Reposition  · Pain Rating Post-Intervention 1: 3/10      Objective:     Communicated with RN prior to session.  Patient found supine with wound vac, peripheral IV upon PT entry to room.     General Precautions: Standard, fall   Orthopedic Precautions:Full weight bearing   Braces: Knee immobilizer  Respiratory Status: Room air     Functional Mobility:  · Bed Mobility:     · Sit to Supine: modified independence  · Transfers:     · Sit to Stand:  modified independence with rolling walker  · Bed to Chair: stand by assistance with  rolling walker  using  Step Transfer  · Gait: ~130' step to gait pattern and slowed pace. SBA overall for safety. Pt reports weakness following gait. BP  120/64. Pt distance limited 2/2 fatigue. Nurse made aware of  PTs findings.           Patient left supine with all lines intact, call button in reach and  present..    GOALS:   Multidisciplinary Problems     Physical Therapy Goals        Problem: Physical Therapy    Goal Priority Disciplines Outcome Goal Variances Interventions   Physical Therapy Goal     PT, PT/OT Ongoing, Progressing     Description: (MET)Bed mobility: SBA  (MET) Sit to stand: SBA  (Revised) Car Transfer: Mod I  with rolling walker  (Revised) Ambulation x 400' feet with mod I and rolling walker  (MET) 1 Step (Curb): Min A  and rolling walker  (MET) 4 Steps: Min A  and L HR  Independent with total knee HEP                       Time Tracking:     PT Received On:    PT Start Time: 1035     PT Stop Time: 1100  PT Total Time (min): 25 min     Billable Minutes: Gait Training 15 and Therapeutic Activity 10    Treatment Type: Treatment  PT/PTA: PT     PTA Visit Number: 0     06/10/2022

## 2022-06-10 NOTE — PLAN OF CARE
Problem: Physical Therapy  Goal: Physical Therapy Goal  Description: (MET)Bed mobility: SBA  (MET) Sit to stand: SBA  (Revised) Car Transfer: Mod I  with rolling walker  (Revised) Ambulation x 400' feet with mod I and rolling walker  (MET) 1 Step (Curb): Min A  and rolling walker  (MET) 4 Steps: Min A  and L HR  Independent with total knee HEP      Outcome: Ongoing, Progressing

## 2022-06-10 NOTE — DISCHARGE INSTRUCTIONS
[facility]  [address] [place] [address] [address]  [place] [address]  Phone [phone]       /      Fax [phone]  SURGEON: Dr. Bowens    After discharge, all questions or concerns should be handled at your surgeon's office ([phone]). If it is a weekend or after hours, you will get the surgeon on call.     PAIN MANAGEMENT: Next Dose Available   Mobic/Meloxicam 7.5mg (Anti-inflammatory) - daily for the next 10 days 6/11/22   Tylenol/Acetaminophen 500mg- every 4 hours, around the clock (WHILE AWAKE) 6:30pm   Ultram/Tramadol 50mg (Pain Med) - every 4-6 hours AS NEEDED for pain When needed   Robaxin/Methocarbamol 750mg (Muscle Relaxer) - Every 6-8 hours AS NEEDED for muscle spasms, thigh pain or additional pain control When needed           COMPLICATION PREVENTION MEDS: Next Dose DUE   Aspirin 81mg twice a day for 6 weeks post-op for blood clot prevention PM on 6/10/22   Senokot S/Monica-Colace 8.6/50mg - 2 tablets once or twice a day while on narcotics and muscle relaxers for constipation prevention PM on 6/1022   Doxycycline 100mg  (Antibiotic) -  twice a day for 14 days post-op to prevent infection PM on 6/10/22         Total Knee Revision      PAIN MEDICATIONS/PAIN MANAGEMENT:  Mobic/Meloxicam 7.5mg (anti-inflammatory) - Twice daily for the next 10 days.    While on Mobic/Meloxicam and Aspirin (blood thinner) at the same time, take a medication once or twice a day to protect your stomach (for the next 10 days) (Examples: Nexium, Prilosec, Prevacid, Omeprazole, Pepcid...etc). If you start having intolerable stomach issues, discontinue the Mobic completely.     Tylenol/Acetaminophen 500mg every 4 hours, around the clock (WHILE AWAKE). Take Ultram (Tramadol) 50mg (pain pill) every 4-6 hours as needed for pain.    **NO MORE THAN 3000mg OF TYLENOL IN 24 HOURS**.     Robaxin/Methocarbamol 750mg (muscle relaxer)- you can take every 6-8 hours as needed for muscle spasms, thigh pain and stiffness,  additional pain control or breakthrough pain medications. This medication is helpful for pain control while lessening your need for narcotics. Please reduce the use gradually as the pain and spasms lessen. DO NOT TAKE AT THE SAME TIME AS A PAIN PILL. YOU WILL BE BETTER SERVED WITH 2 HOURS BETWEEN PAIN PILL AND MUSCLE RELAXER.       Use the medication log in your discharge packet to keep track of your medications.      **Other things that help with pain control is WALKING, COMPRESSION WRAP, ICE and ELEVATION!!**      BLOOD CLOT PREVENTION:   Aspirin 81 mg twice a day for 6 weeks postop. Start on 6/10/22. Stop on 7/21/22.  If you were taking a baby aspirin prior to surgery, okay to restart after 6 weeks - 7/22/22.  You need to continuing wearing your compression stocking (MIKEY Hose - ThromboEmbolic Disease Prevention Device) for the next 2-6 weeks post-op. It is ok to remove them for hygiene and at bedtime.   Hand wash and Dry. **If the swelling persists in the legs after you stop wearing the Mikey hose, continue to wear them until the swelling decreases.**  REMOVE STOCKINGS AT LEAST DAILY FOR SKIN ASSESSMENT.   Do NOT let the stockings roll down, creating a tourniquet around the back of your knee. If you need to, leave the excess at the bottom of the stocking.   The best thing you can do to prevent blood clots is to walk around as much as possible, AT LEAST EVERY 1-2 HOURS.       CONSTIPATION PREVENTION:   Miralax or Senokot S/Monica-Colace and Stool softeners EVERY DAY while on pain meds.  Use other more aggressive over the counter LAXATIVES as needed for constipation (Examples: Milk of Magnesia, Dulcolax tabs or suppository, Magnesium Citrate, Fleet's Enema...etc.)   Drink lots of water.  Increase Fiber in diet.  Increase walking distance each day  DO NOT GO TOO LONG WITHOUT HAVING A BOWEL MOVEMENT!      ACTIVITY:  You will be FULL weight-bearing on your operative leg.  Please do not take yourself off of the walker too  soon.  Please allow your outpatient therapist or surgeon to guide you.   Elevate your affected extremity way above the level of the heart to reduce swelling 2 to 3 times a day, 20-30 minutes at a time.  Walk around at least every 1-2 hours while awake.  No heavy lifting, pulling, pushing or straining.  NO Range of  Motion to the knee - Maintain Knee immobilizer when out of bed. Ok to loosen for comfort when in bed or chair.     THERAPY  Outpatient Physical Therapy-bring your prescription to the clinic of your choice as soon as possible.    WOUND CARE:   Remove Prevena wound vac on Wednesday June 15th and discard the entire system. On Wednesday, once Prevena wound vac is removed, apply an occlusive (coverlet) dressing to the hip and change every other day and as needed, until your follow-up appointment with Dr. Bowens.   DO NOT WET WOUND or apply any ointments, creams, lotions or antiseptics.  Ace wrap - apply your compression stocking and apply the ace wrap where the stocking stops for extra added compression to the knee.   May wet incision after you follow-up with your surgeon.   Ok to shower before then if able to keep wound from getting wet (plastic barrier, saran wrap or cling wrap and tape).   DO NOT TOUCH INCISION  Apply Ice to the Knee and thigh as much as possible.      URINARY RETENTION:  If you start having difficulty urinating, decrease the use of Pain pills and muscle relaxers and notify your primary care doctor.     PNEUMONIA PREVENTION:  Stay out of bed as much as possible and walk around every 1-2 hours.  Continue breathing exercises (Incentive Spirometry) every 1-2 hours while mobility is limited and while you are on pain pills.    INFECTION PREVENTION:  Proper handwashing before and after dressing changes. Do not wet the wound. Wound care instructions as written above. NOTIFY MD OF EXCESSIVE WOUND DRAINAGE.  No alcohol, smoking or tobacco products  Pets should not be allowed around the wound or  the dressing.   Treat UTI and skin infections as soon as possible.  Pre-medicate with antibiotics prior to dental or surgical procedures.   If you are diabetic, MAINTAIN GOOD BLOOD SUGAR CONTROL (Below 150) DURING YOUR RECOVERY. If you see high numbers, notify your primary care doctor.     Call your SURGEON'S OFFICE ([phone]) if you experience the following signs and symptoms of infection:   Unusual redness, swelling, excessive, cloudy or foul smelling drainage at the incision site.   Persistent low grade temp OR a temp greater than 102 F, unrelieved by Tylenol  Pain at surgical site, unrelieved by pain meds    Warning signs of a blood clot in your leg: (CALL YOUR SURGEON)  New onset or increasing pain in calf, new onset tenderness or redness above or below the knee or increasing swelling of your calf, ankle, or foot.  Warning signs that a blood clot has traveled to your lungs: (REPORT TO THE ER/CALL 144)  Sudden or increase in Shortness of breath, sudden onset of chest pains, or  Localized chest pain with coughing.         IF ANY ISSUES ARISE AND YOU FEEL THE NEED TO CALL YOUR PRIMARY CARE DOCTOR, PLEASE LET YOUR SURGEON KNOW AS WELL.     For emergencies, please report to OUR ([facility] or [facility] main campus) Emergency department and tell them to call YOUR SURGEON at [phone].     BEFORE MAKING ANY CHANGES TO THE MEDICAL CARE PLAN OR GOING TO THE EMERGENCY ROOM, PLEASE CONTACT THE SURGEON.    3rd floor nursing unit # [phone]  Use this number for questions about your discharge instructions or problems filling your discharge prescriptions.

## 2022-06-10 NOTE — PLAN OF CARE
Problem: Physical Therapy  Goal: Physical Therapy Goal  Description: (MET)Bed mobility: SBA  (MET) Sit to stand: SBA  (MET) Car Transfer: Mod I  with rolling walker  (Not met) Ambulation x 400' feet with mod I and rolling walker  (MET) 1 Step (Curb): Min A  and rolling walker  (MET) 4 Steps: Min A  and L HR  Independent with total knee HEP      Outcome: Met

## 2022-06-10 NOTE — PROGRESS NOTES
Pain controlled.  Resting in bed. More lethargic today than previous    Vital Signs  Temp: 97.3 °F (36.3 °C)  Temp src: Oral  Pulse: 70  Heart Rate Source: Monitor  Resp: 18  SpO2: 95 %  Pulse Oximetry Type: Intermittent  Flow (L/min): 10  Oxygen Concentration (%): 80  O2 Device (Oxygen Therapy): room air  BP: (!) 103/50  BP Location: Left arm  BP Method: Automatic  Patient Position: Lying  Height and Weight  Height: 5' (152.4 cm)  Height Method: Stated  Weight: 84.1 kg (185 lb 6.5 oz)  Weight Method: Stated  BSA (Calculated - sq m): 1.89 sq meters  BMI (Calculated): 36.2  Weight in (lb) to have BMI = 25: 127.7]    +FHL/EHL  BCR distally  Dressing c/d/i  SILT distally    Recent Lab Results       06/10/22  0527        Anion Gap 8.0       BUN 25.5       BUN/CREAT RATIO 32       Calcium 8.8       Chloride 104       CO2 25       Creatinine 0.79       eGFR if non African American >60       Glucose 108       Hematocrit 27.3       Hemoglobin 9.1       MCH 30.6       MCHC 33.3       MCV 91.9       MPV 9.3       nRBC 0.0       Platelets 124       Potassium 3.7       RBC 2.97       RDW 14.4       Sodium 137       WBC 6.7             A/P:  Status post revision L TKA  Pain controlled  Overall patient doing well. Increased lethargy today  Therapy for mobility and ambulation.  ASA for DVT PPx  Fluids today - will re eval this PM.  Home later today if improves vs tomorrow

## 2022-06-11 ENCOUNTER — PATIENT OUTREACH (OUTPATIENT)
Dept: ADMINISTRATIVE | Facility: CLINIC | Age: 72
End: 2022-06-11
Payer: MEDICARE

## 2022-06-11 LAB — BACTERIA SPEC ANAEROBE CULT: NORMAL

## 2022-06-11 NOTE — PROGRESS NOTES
C3 nurse spoke with Amita Trejo for a TCC post hospital discharge follow up call. The patient reports does not have a scheduled HOSFU appointment. Patient advised to contact their PCP to schedule a HOSPFU within 7-14 days. The patient does have an appointment with Dr. Bowens on 6/23/2022 @ 1300

## 2022-06-13 LAB
BACTERIA SPEC CULT: NO GROWTH
VIEW PATHOLOGY REPORT (RELIAPATH): NORMAL

## 2022-06-15 NOTE — DISCHARGE SUMMARY
[facility]  Discharge Summary      Admit Date: 6/8/2022    Discharge Date and Time: 6/10/2022  4:34 PM    Attending Physician: Mary att. providers found     Reason for Admission: Failed left total knee arthroplasty    Procedures Performed: Procedure(s) (LRB):  REVISION, ARTHROPLASTY, KNEE (Left)    Hospital Course Patient seen in clinic with complaints of left knee pain with significant stiffness status post a left total knee arthroplasty. Tried and failed all conservative measures including activity modification, anti-inflammatories, and therapy. Patient was worked up for infection and found to be negative. Patient felt as though they have reached a point of disability with regards to left knee pain. Risk, benefits, and alternatives discussed for revision of left total knee arthroplasty. Despite these risks, the patient wished to proceed with surgical intervention.    Patient admitted as an inpatient. Seen preoperatively by anesthesia and cleared for surgery. Patient was then taken to the OR and a revision of left total knee arthroplasty was performed. For full details please see dictated operative note. Patient tolerated the procedure without any issues and was transferred to the floor postoperatively. Physical therapy began working with the patient on postop day 1 and patient was made weightbearing as tolerated to affected extremity. Patient progressed well with physical therapy and eventually cleared all physical therapy guidelines. Patient's pain and vitals remained stable throughout hospitalization. Wound was checked and found to be clean, dry, and intact. At this point the patient was deemed suitable to discharge home.    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       Discharged Condition: good    Disposition: Home or Self Care    Follow Up/Patient Instructions:     Medications:  Reconciled Home Medications:      Medication List      START taking these medications    acetaminophen 500 MG tablet  Commonly known as: TYLENOL  Take 1 tablet (500 mg total) by mouth every 4 (four) hours. for 14 days     aspirin 81 MG EC tablet  Commonly known as: ECOTRIN  Take 1 tablet (81 mg total) by mouth every 12 (twelve) hours.     doxycycline 100 MG Cap  Commonly known as: VIBRAMYCIN  Take 1 capsule (100 mg total) by mouth every 12 (twelve) hours. If not covered by insurance, please inform the patient of the cash price. for 14 days     meloxicam 7.5 MG tablet  Commonly known as: MOBIC  Take 1 tablet (7.5 mg total) by mouth once daily. for 10 days     methocarbamoL 750 MG Tab  Commonly known as: ROBAXIN  Take 1 tablet (750 mg total) by mouth every 6 (six) hours. for 14 days     senna-docusate 8.6-50 mg 8.6-50 mg per tablet  Commonly known as: PERICOLACE  Take 2 tablets by mouth 2 (two) times a day. for 14 days     traMADoL 50 mg tablet  Commonly known as: ULTRAM  Take 1 tablet (50 mg total) by mouth every 4 (four) hours as needed for Pain.        CHANGE how you take these medications    amLODIPine 5 MG tablet  Commonly known as: NORVASC  Take 1 tablet (5 mg total) by mouth every evening.  What changed: when to take this        CONTINUE taking these medications    B COMPLEX 1 ORAL     B-complex with vitamin C tablet  Commonly known as: Z-Bec or Equiv  Take 1 tablet by mouth once daily.     cholecalciferol (vitamin D3) 125 mcg (5,000 unit) capsule  Take 5,000 Units by mouth.     lisdexamfetamine 70 MG  capsule  Commonly known as: VYVANSE  Take 70 mg by mouth.     metoprolol tartrate 50 MG tablet  Commonly known as: LOPRESSOR  Take 75 mg by mouth.     pantoprazole 40 MG tablet  Commonly known as: PROTONIX  Take 40 mg by mouth.     polysaccharide iron complex 200 mg iron Cap  Take 200 mg by mouth.     pramipexole 1.5 MG tablet  Commonly known as: MIRAPEX  Take 1.5 mg by mouth 2 (two) times a day.     telmisartan-hydrochlorothiazide 40-12.5 mg per tablet  Commonly known as: MICARDIS HCT  Take by mouth.        STOP taking these medications    pregabalin 75 MG capsule  Commonly known as: LYRICA          No discharge procedures on file.   Follow-up Information     [facility] Follow [facility].    Why: This is the outpatient therapy facility.They will contact pt with appt date & time, Call if you have questions or concerns.           Sohan Bowens MD Follow up on 6/23/2022.    Specialty: Orthopedic Surgery  Why: Ortho follow up appointment on Thursday 6/23 at 1:00 pm with Dr. Bowens.  Contact information:  [address] [place] [address]  [address]  [facility] [address]  [phone]

## 2022-06-23 ENCOUNTER — HOSPITAL ENCOUNTER (OUTPATIENT)
Dept: RADIOLOGY | Facility: CLINIC | Age: 72
Discharge: HOME | End: 2022-06-23
Attending: ORTHOPAEDIC SURGERY
Payer: MEDICARE

## 2022-06-23 ENCOUNTER — OFFICE VISIT (OUTPATIENT)
Dept: ORTHOPEDICS | Facility: CLINIC | Age: 72
End: 2022-06-23
Payer: MEDICARE

## 2022-06-23 VITALS
DIASTOLIC BLOOD PRESSURE: 77 MMHG | BODY MASS INDEX: 36.32 KG/M2 | HEART RATE: 83 BPM | HEIGHT: 60 IN | WEIGHT: 185 LBS | SYSTOLIC BLOOD PRESSURE: 160 MMHG

## 2022-06-23 DIAGNOSIS — Z96.652 AFTERCARE FOLLOWING LEFT KNEE JOINT REPLACEMENT SURGERY: ICD-10-CM

## 2022-06-23 DIAGNOSIS — Z47.1 AFTERCARE FOLLOWING LEFT KNEE JOINT REPLACEMENT SURGERY: Primary | ICD-10-CM

## 2022-06-23 DIAGNOSIS — Z47.1 AFTERCARE FOLLOWING LEFT KNEE JOINT REPLACEMENT SURGERY: ICD-10-CM

## 2022-06-23 DIAGNOSIS — Z96.652 AFTERCARE FOLLOWING LEFT KNEE JOINT REPLACEMENT SURGERY: Primary | ICD-10-CM

## 2022-06-23 DIAGNOSIS — S72.435A: ICD-10-CM

## 2022-06-23 PROCEDURE — 99024 POSTOP FOLLOW-UP VISIT: CPT | Mod: POP,,, | Performed by: ORTHOPAEDIC SURGERY

## 2022-06-23 PROCEDURE — 73562 XR KNEE 3 VIEW LEFT: ICD-10-PCS | Mod: LT,,, | Performed by: ORTHOPAEDIC SURGERY

## 2022-06-23 PROCEDURE — 99024 PR POST-OP FOLLOW-UP VISIT: ICD-10-PCS | Mod: POP,,, | Performed by: ORTHOPAEDIC SURGERY

## 2022-06-23 PROCEDURE — 73562 X-RAY EXAM OF KNEE 3: CPT | Mod: LT,,, | Performed by: ORTHOPAEDIC SURGERY

## 2022-06-23 NOTE — PROGRESS NOTES
Past Medical History:   Diagnosis Date    Acid reflux     CLL (chronic lymphocytic leukemia)     Hypertension        Past Surgical History:   Procedure Laterality Date    CARPAL TUNNEL RELEASE      CYST REMOVAL      KNEE SURGERY      lumpectomy Left     bening breast lumpectomy    REVISION OF KNEE ARTHROPLASTY Left 6/8/2022    Procedure: REVISION, ARTHROPLASTY, KNEE;  Surgeon: Sohan Bowens MD;  Location: [facility];  Service: Orthopedics;  Laterality: Left;  FEMUR / PATHOLOGY / ASHLEY    SHOULDER SURGERY      SINUS SURGERY      spinal injections      THYROID SURGERY      TONSILLECTOMY         Current Outpatient Medications   Medication Sig    amLODIPine (NORVASC) 5 MG tablet Take 1 tablet (5 mg total) by mouth every evening.    aspirin (ECOTRIN) 81 MG EC tablet Take 1 tablet (81 mg total) by mouth every 12 (twelve) hours.    B-complex with vitamin C (Z-BEC OR EQUIV) tablet Take 1 tablet by mouth once daily.    cholecalciferol, vitamin D3, 125 mcg (5,000 unit) capsule Take 5,000 Units by mouth.    doxycycline (VIBRAMYCIN) 100 MG Cap Take 1 capsule (100 mg total) by mouth every 12 (twelve) hours. If not covered by insurance, please inform the patient of the cash price. for 14 days    lisdexamfetamine (VYVANSE) 70 MG capsule Take 70 mg by mouth.    metoprolol tartrate (LOPRESSOR) 50 MG tablet Take 75 mg by mouth.    pantoprazole (PROTONIX) 40 MG tablet Take 40 mg by mouth.    polysaccharide iron complex 200 mg iron Cap Take 200 mg by mouth.    telmisartan-hydrochlorothiazide (MICARDIS HCT) 40-12.5 mg per tablet Take by mouth.    vitamin B complex (B COMPLEX 1 ORAL)     acetaminophen (TYLENOL) 500 MG tablet Take 1 tablet (500 mg total) by mouth every 4 (four) hours. for 14 days    methocarbamoL (ROBAXIN) 750 MG Tab Take 1 tablet (750 mg total) by mouth every 6 (six) hours. for 14 days    pramipexole (MIRAPEX) 1.5 MG tablet Take 1.5 mg by mouth 2 (two) times a day.    senna-docusate 8.6-50  mg (PERICOLACE) 8.6-50 mg per tablet Take 2 tablets by mouth 2 (two) times a day. for 14 days (Patient not taking: Reported on 6/23/2022)     No current facility-administered medications for this visit.       Review of patient's allergies indicates:   Allergen Reactions    Duloxetine      Other reaction(s): Bradycardia, Hypotension, Vomit    Lorazepam      Other reaction(s): Confusion  amnesia         Family History   Family history unknown: Yes       Social History     Socioeconomic History    Marital status:    Tobacco Use    Smoking status: Never Smoker    Smokeless tobacco: Never Used   Substance and Sexual Activity    Alcohol use: Never    Drug use: Never    Sexual activity: Yes       Chief Complaint:   Chief Complaint   Patient presents with    Post-op Evaluation     LEFT TOTAL KNEE REVISION 6/8/22, NO COMPLAINTS, SHE REPORTS DOING, PHYSICAL THERAPY IS GOING OKAY       History of present illness: Amita Trejo is a 71 y.o. female, presents to clinic today 2 weeks status post revision of L TKA with intraoperative medial condyle femoral fracture.  Overall patient is doing very well.  Presents with knee immobilizer on today in clinic.  Dressing is clean and dry.  She does have wrapped with Ace bandages well.  Denies any pain.  States only took 1 Tylenol since discharge.  Current physical therapy which they are doing strengthening exercises rather than range of motion.  Has held range of motion at this time.      Review of Systems:    Denies fevers, chills, chest pain, shortness of breath. Comprehensive review of systems performed and otherwise negative except as noted in HPI      Physical Examination:    General: awake and alert, no acute distress, healthy appearing  Head and Neck: Head atraumatic/normocephalic. Moist MM  CV: brisk cap refill  Lungs: non-labored breathing, w/o cough or SOB  Skin: no rashes present, warm to touch  Neuro: sensation grossly intact distall     Vital Signs:    Vitals:     06/23/22 1251   BP: (!) 160/77   Pulse: 83       Body mass index is 36.13 kg/m².    Examination left knee:    Incision clean dry and intact. No erythema or drainage or signs of infection.  Sensation intact distally to left foot  Positive FHL/EHL/gastrocsoleus/tib ant  Brisk capillary refill to left foot  No swelling or signs of DVT  Range of motion: extension at 0 and flexion at 80  Stable to varus valgus  Stable to anterior and posterior drawer    X-rays:  Three views of this left knee reviewed. Patient's implants appear well fixed. No signs of loosening or subsidence noted.  Medial femoral condyle fracture site with interval callus in proper position at this time.     Assessment::post op status post revision of L TKA with medial femoral condyle    Plan:  Patient presents to clinic today 2 weeks status post revision of left total knee arthroplasty with medial condyle fracture.  The patient is recovering well.  Incision site is well healed staples are removed today in clinic.  We will remove the knee immobilizer and have patient placed in a knee hinged brace.  We will prescribe range of motion exercises physical therapy, with 0-90 degrees for 2 weeks then full ROMAT. Follow-up in left for repeat x-rays and evaluation. All questions and concerns were addressed. The patient understands and agrees with the plan of care.    The above findings, diagnostics, and treatment plan were discussed with Dr. Sohan Bowens who is in agreement with the plan of care.    This note was created using [employer] voice recognition software that occasionally misinterpreted phrases or words.    Consult note is delivered via Epic messaging service.

## 2022-07-05 ENCOUNTER — TELEPHONE (OUTPATIENT)
Dept: ORTHOPEDICS | Facility: CLINIC | Age: 72
End: 2022-07-05
Payer: MEDICARE

## 2022-07-05 NOTE — TELEPHONE ENCOUNTER
Patient left a message stating that she has a pocket of fluid on the side of her knee.  Spoke to Dr. Bowens and he instructed her to wrap with an ace and elevate and ice.  She understood and will call if she needs anything else.

## 2022-07-13 PROBLEM — C83.00 LYMPHOMA, SMALL LYMPHOCYTIC: Status: ACTIVE | Noted: 2022-07-13

## 2022-07-13 NOTE — PROGRESS NOTES
Subjective:       Patient ID: Amita Trejo is a 71 y.o. female.    Chief Complaint: SLL      History of Present Illness  Chief complaint: SLL Lugano II    HPI: 72 y/o F w/ PMHx of ELI on CPAP, mitral valve prolapse, HTN, ADHD, neuropathy, DJD referred to [facility] for newly diagnosed stage II SLL    She had routine screening MMG 2/2021 that showed some mildly enlarged b/l axillary nodes. She had b/l axillary US with Dr Lugo 3/2021 most suggestive of inflammation. Repeat US of right axilla obtained 6/2021 that showed some borderline enlarged nodes with benign sonographic appearance. She had right axillary LN biopsy showing SLL/CLL    Colonoscopy Dr Kwan 2016, negative per pt    Today 7/14/22: she is here for follow up. She had left knee replacement 7/2021 and had to go back for a second surgery on right knee since her last visit with us. She is still having some issues with stiffness and decreased ROM but does report improvement in pain. She denies fevers, chills, drenching night sweats, poor appetite, early satiety, excessive weakness or fatigue, CP, SOB, N/V/C/D, melena, hematochezia, hematuria, excessive or abnormal bruising. She does report lower back pain that is chronic and unchanged from prior. Appetite is good and weight is stable from prior. She would like to lose some weight but has had a hard time due to issues with mobility/right knee.  No other complaints or concerns reported.    PMHx: ELI on CPAP, mitral valve prolapse, HTN, ADHD, neuropathy, DJD  PSHx: tonsillectomy, sinus surgery x2, left breast lumpectomy (benign), torn meniscus right x2, thyroglossal duct cyst, right shoulder rotator cuff, back surgery  Social Hx: former smoker quit in 1986, 10 pack year, no ETOH, no drugs  Family Hx: sister: DCIS and melanoma, paternal grandfather: head and neck cancer (not a smoker)  Meds: reviewed  Allergies: ativan, cymbalta    Labs:  7/6/22 Cr 0.88, Alb 3.8, TP 7.1, , Uric acid 5.5, WBC 10.61, Hgb  10.5, , ANC 2.90, ALC 6.90  3/3/22 WBC 13.5 RBC 3.74 Hg 11.2 MCV 94 rdw 13.8  ANC 3.4 ALC 8.9 Cr 1.12 Alb 4.4 Ca 9.4   12/8/21 Cr 0.86, Alb 3.8, TP 7.0, Ca 9.7, AlkPhos 103, , Uric acid 5.9, WBC 13.21, Hgb 11.4, , Retic 1.43, ANC 4.41, ALC 7.73  9/21/21 Cr 1.16 Alb 3.9 TP 7.4 Ca 9.9 AlkPhos 92 AST 21 ALT 12  ferritin 122 WBC 9.82 Hg 11.1  ANC 3.35 ALC 4.91  7/21/21  Ferritin 138 Hep B core ab neg Hep B s ag neg Hep B s ab neg Hep C neg Uric acid 6.7 WBC 8.78 Hg 12.2  ANC 2.78 ALC 5.14 Direct Joao neg B2 micro 4.2 SPEP w/ ANGIE IgG 930 IgA 467 IgM 43 Abs kappa 43 Abs lambda 27.8 SFLC ratio 1.55 no M spike  1/18/20 Cr 0.89 Alb 3.9 TP 7.1 WBC 8.46 Hg 12.9 MCV 90.9  ANC 3.18 ALC 4.25    Imaging:  3/29/22 MMG prominent nodes in each axilla. Some nodes have minimally increased in size.     6/14/21 US axilla unilateral: 6 right axillary nodes, 2 are borderline enlarged measuring 2.4x1.7x1.3cm and 2.3x1.3x1.1cm. Benign sonographic appearance    2/18/21 CT N/C/A/P w/ contrast: no cervical adenopathy. Small thyroid nodules. No acute pulm disease. Small focus of nodular pleural thickening in fissure of left lung stable since 2019. Increased size of several borderline enlarged axillary LNs b/l in comparison to 2019 although this finding is nonspecific. Simple renal cysts on both kidneys increased in size compared to 2018 but not suspicious for malignancy    2/5/21 screening MMG: [id number]. Small LN in upper and outer quadrant of left breast increased slightly in size from prior exams. Multiple LN within each axilla larger and more dense than on prior exam. Lymphoma should be considered    Path:  6/16/21 right axillary LN biopsy: atypical lymphoproliferative process consistent with involvement by CLL/SLL.  Flow: abnormal B cells 82% of lymphs, CD19+, CD20+ dim, CD5+, CD23+ variable, CD11c partial+, CD38-, lambda +dim  FISH: del13q/-13 detected. del17p,  t(11:14), trisomy 12, del11q and del6q negative.        Review of Systems  CONSTITUTIONAL: no fevers, no chills,no weight loss, no fatigue, no weakness  HEMATOLOGIC: no abnormal bleeding, no abnormal bruising, no drenching night sweats  ONCOLOGIC: no new masses or lumps  HEENT: no vision loss, no tinnitus or hearing loss, no nose bleeding, no dysphagia, no odynophagia  CVS: no chest pain, no palpitations, no dyspnea on exertion  RESP: no shortness of breath, no hemoptysis, no cough  BREAST: no nipple discharge, no breast tenderness, no breast masses on self breast examination  GI: no nausea, no vomiting, no diarrhea, no constipation, no melena, no hematochezia, no hematemesis, no abdominal pain, no increase in abdominal girth  : no dysuria, no hematuria, no discharge  GYN: no abnormal vaginal bleeding, no dyspareunia, no vaginal discharge  INTEGUMENT: no rashes, no abnormal bruising, no nail pitting, no hyperpigmentation  NEURO: no falls, no memory loss, no paresthesias or dysesthesias, no urofecal incontinence or retention, no loss of strength on any extremity  MSK: +chronic lower back pain, no new joint pain but chronic b/l knee pain, no joint swelling  PSYCH: no suicidal or homicidal ideation, no depression, no insomnia, no anhedonia  ENDOCRINE: no heat or cold intolerance, no polyuria, no polydipsia        Objective:      Physical Exam  Vitals:    07/14/22 1029   BP: 105/67   Pulse: 73   Resp: 18   Temp: 97.7 °F (36.5 °C)        ECOG PS 1  GA: AAOx3, NAD  HEENT: NCAT, PERRLA, EOMI, good dentition, no oral ulcers. Tonsils surgically removed  LYMPH: no cervical, inguinal or supraclavicular adenopathy. Small b/l axillary LNs, largest about 2cm in right axilla, others around 1cm  CVS: s1s2 RRR, no M/R/G  RESP: CTA b/l, no crackles, no wheezes or rhonchi  ABD: soft, NT, ND, BS+, no hepatosplenomegaly  EXT: no deformities, b/l pitting pedal edema  SKIN: no rashes, no bruises or purpura, warm and dry  NEURO: normal  mentation, strength 5/5 on all 4 extremities, no sensory deficits    Assessment:       Problem List Items Addressed This Visit        Oncology    Lymphoma, small lymphocytic - Primary            Plan:     1. Lymphoma, small lymphocytic C83.00   Findings are most consistent with SLL Lugano stage II vs CLL Juarez I. She does likely have peripheral blood involvement as well  In any case, no anemia, no thrombocytopenia, no B symptoms, only 2 narciso stations involved (b/l axilla) and 13q del which confers good prognosis. No IGHV available but I do not think this is necessary at this time  Had systemic imaging with CT N/C/A/P w/ IV contrast 2/2021 without any bulky disease  No further workup needed at this time. Will observe every 12-16 weeks. If any indication arises, then she will also need PET CT and bone marrow biopsy  RTC in 16 weeks with repeat labs  Call if any questions or concerns    LIZZIE Washburn

## 2022-07-14 ENCOUNTER — OFFICE VISIT (OUTPATIENT)
Dept: HEMATOLOGY/ONCOLOGY | Facility: CLINIC | Age: 72
End: 2022-07-14
Payer: MEDICARE

## 2022-07-14 VITALS
WEIGHT: 186.63 LBS | BODY MASS INDEX: 36.64 KG/M2 | SYSTOLIC BLOOD PRESSURE: 105 MMHG | RESPIRATION RATE: 18 BRPM | DIASTOLIC BLOOD PRESSURE: 67 MMHG | HEIGHT: 60 IN | TEMPERATURE: 98 F | OXYGEN SATURATION: 96 % | HEART RATE: 73 BPM

## 2022-07-14 DIAGNOSIS — C83.00 LYMPHOMA, SMALL LYMPHOCYTIC: Primary | ICD-10-CM

## 2022-07-14 PROCEDURE — 99213 OFFICE O/P EST LOW 20 MIN: CPT | Mod: ,,, | Performed by: NURSE PRACTITIONER

## 2022-07-14 PROCEDURE — 99213 PR OFFICE/OUTPT VISIT, EST, LEVL III, 20-29 MIN: ICD-10-PCS | Mod: ,,, | Performed by: NURSE PRACTITIONER

## 2022-07-26 ENCOUNTER — OFFICE VISIT (OUTPATIENT)
Dept: ORTHOPEDICS | Facility: CLINIC | Age: 72
End: 2022-07-26
Payer: MEDICARE

## 2022-07-26 ENCOUNTER — HOSPITAL ENCOUNTER (OUTPATIENT)
Dept: RADIOLOGY | Facility: CLINIC | Age: 72
Discharge: HOME | End: 2022-07-26
Attending: NURSE PRACTITIONER
Payer: MEDICARE

## 2022-07-26 VITALS — WEIGHT: 186 LBS | BODY MASS INDEX: 36.52 KG/M2 | HEIGHT: 60 IN

## 2022-07-26 DIAGNOSIS — S72.435A: ICD-10-CM

## 2022-07-26 DIAGNOSIS — Z47.1 AFTERCARE FOLLOWING LEFT KNEE JOINT REPLACEMENT SURGERY: ICD-10-CM

## 2022-07-26 DIAGNOSIS — T84.82XS ARTHROFIBROSIS OF TOTAL KNEE ARTHROPLASTY, SEQUELA: Primary | ICD-10-CM

## 2022-07-26 DIAGNOSIS — M17.11 PRIMARY OSTEOARTHRITIS OF RIGHT KNEE: ICD-10-CM

## 2022-07-26 DIAGNOSIS — Z96.652 AFTERCARE FOLLOWING LEFT KNEE JOINT REPLACEMENT SURGERY: ICD-10-CM

## 2022-07-26 PROCEDURE — 73562 XR KNEE 3 VIEW RIGHT: ICD-10-PCS | Mod: RT,,, | Performed by: NURSE PRACTITIONER

## 2022-07-26 PROCEDURE — 73562 X-RAY EXAM OF KNEE 3: CPT | Mod: RT,,, | Performed by: NURSE PRACTITIONER

## 2022-07-26 PROCEDURE — 99024 PR POST-OP FOLLOW-UP VISIT: ICD-10-PCS | Mod: POP,,, | Performed by: ORTHOPAEDIC SURGERY

## 2022-07-26 PROCEDURE — 99024 POSTOP FOLLOW-UP VISIT: CPT | Mod: POP,,, | Performed by: ORTHOPAEDIC SURGERY

## 2022-07-26 NOTE — PROGRESS NOTES
Past Medical History:   Diagnosis Date    Acid reflux     CLL (chronic lymphocytic leukemia)     Hypertension        Past Surgical History:   Procedure Laterality Date    CARPAL TUNNEL RELEASE      CYST REMOVAL      KNEE SURGERY      lumpectomy Left     bening breast lumpectomy    REVISION OF KNEE ARTHROPLASTY Left 6/8/2022    Procedure: REVISION, ARTHROPLASTY, KNEE;  Surgeon: Sohan Bowens MD;  Location: [facility];  Service: Orthopedics;  Laterality: Left;  FEMUR / PATHOLOGY / ASHLEY    SHOULDER SURGERY      SINUS SURGERY      spinal injections      THYROID SURGERY      TONSILLECTOMY         Current Outpatient Medications   Medication Sig    amLODIPine (NORVASC) 5 MG tablet Take 1 tablet (5 mg total) by mouth every evening.    B-complex with vitamin C (Z-BEC OR EQUIV) tablet Take 1 tablet by mouth once daily.    cholecalciferol, vitamin D3, 125 mcg (5,000 unit) capsule Take 5,000 Units by mouth.    lisdexamfetamine (VYVANSE) 70 MG capsule Take 70 mg by mouth.    metoprolol tartrate (LOPRESSOR) 50 MG tablet Take 75 mg by mouth.    pantoprazole (PROTONIX) 40 MG tablet Take 40 mg by mouth.    polysaccharide iron complex 200 mg iron Cap Take 200 mg by mouth.    pramipexole (MIRAPEX) 1.5 MG tablet Take 1.5 mg by mouth 2 (two) times a day.    telmisartan-hydrochlorothiazide (MICARDIS HCT) 40-12.5 mg per tablet Take by mouth.    vitamin B complex (B COMPLEX 1 ORAL)     aspirin (ECOTRIN) 81 MG EC tablet Take 1 tablet (81 mg total) by mouth every 12 (twelve) hours.     No current facility-administered medications for this visit.       Review of patient's allergies indicates:   Allergen Reactions    Duloxetine      Other reaction(s): Bradycardia, Hypotension, Vomit    Lorazepam      Other reaction(s): Confusion  amnesia      Tramadol Other (See Comments)     nightmares       Family History   Problem Relation Age of Onset    Hypertension Mother     COPD Mother     Hypertension Father      COPD Father     Breast cancer Sister     Diabetes Maternal Grandmother     Diabetes Paternal Grandmother        Social History     Socioeconomic History    Marital status:    Tobacco Use    Smoking status: Former Smoker     Types: Cigarettes    Smokeless tobacco: Former User   Substance and Sexual Activity    Alcohol use: Not Currently    Drug use: Never    Sexual activity: Yes       Chief Complaint:   Chief Complaint   Patient presents with    Follow-up     1 mth F/u Revision Left TKA on 6/8/22-9/6/22, pt states knee is doing good but leg is a little sore and stiff, pt states of no pain, pt states therapy has been going good,        History of present illness:  8-year-old female presents for follow-up of her total knee arthroplasty revision on the left knee.  The left knee is doing better.  It feels better.  Less painful.  He is having some issues getting range of motion.  Has been in a hinged knee brace due to medial femoral condyle avulsion.  Knee is stable.  Right knee is complaining of ongoing complaints of pain to the right knee.  has been previously diagnosed with osteoarthritis in the past.  Requesting injection future.  Patient has tried cortisone as well as viscosupplementation.      Review of Systems:    Constitution: Negative for chills, fever, and sweats.  Negative for unexplained weight loss.    HENT:  Negative for headaches and blurry vision.    Cardiovascular:Negative for chest pain or irregular heart beat. Negative for hypertension.    Respiratory:  Negative for cough and shortness of breath.    Gastrointestinal: Negative for abdominal pain, heartburn, melena, nausea, and vomitting.    Genitourinary:  Negative bladder incontinence and dysuria.    Musculoskeletal:  See HPI    Neurological: Negative for numbness.    Psychiatric/Behavioral: Negative for depression.  The patient is not nervous/anxious.      Endocrine: Negative for polyuria    Hematologic/Lymphatic: Negative for bleeding  problem.  Does not bruise/bleed easily.    Skin: Negative for poor would healing and rash      Physical Examination:    Vital Signs:  There were no vitals filed for this visit.    Body mass index is 36.33 kg/m².    General: No acute distress, alert and oriented, healthy appearing    HEENT: Head is atraumatic, mucous membranes are moist    Neck: Supples, no JVD    Cardiovascular: Palpable dorsalis pedis and posterior tibial pulses, regular rate and rhythm to those pulses    Lungs: Breathing non-labored    Skin: no rashes appreciated    Neurologic: Can flex and extend knees, ankles, and toes. Sensation is grossly intact    Left knee:  Patient midline incision is well-healed.  Brisk capillary refill distally.  Sensation and disappeared patient with range of motion from from 0-80    Right knee:  Patient has significant crepitus throughout range of motion of the right knee.  Brisk capillary refill distally.  Sensation intact distally.    X-rays:  Two views of the left knee taken today.  No x-rays of the right knee performed today.  Patient with well-fixed prosthesis.  Fracture again noted but healing.  No displacement seen.     Assessment::  Status post total knee arthroplasty on the left side  Right knee osteoarthritis    Plan:  The patient is overall doing fairly well with regard to the left knee.  Her motion is a bit of an issue.  We will check her again in a couple weeks to check her progress.  We could perform an manipulation if necessary at that point.  With regards to the right knee, patient is having ongoing complaints of osteoarthritis.  She has tried multiple injections including cortisone and viscosupplementation in the past.  These have failed to relieve her symptoms.  We will get her approved for Zilretta.    This note was created using [employer] voice recognition software that occasionally misinterpreted phrases or words.    Consult note is delivered via Epic messaging service.

## 2022-08-02 ENCOUNTER — DOCUMENTATION ONLY (OUTPATIENT)
Dept: ADMINISTRATIVE | Facility: HOSPITAL | Age: 72
End: 2022-08-02
Payer: MEDICARE

## 2022-08-11 ENCOUNTER — OFFICE VISIT (OUTPATIENT)
Dept: ORTHOPEDICS | Facility: CLINIC | Age: 72
End: 2022-08-11
Payer: MEDICARE

## 2022-08-11 VITALS — HEIGHT: 60 IN | BODY MASS INDEX: 36.52 KG/M2 | WEIGHT: 186 LBS

## 2022-08-11 DIAGNOSIS — M17.11 PRIMARY OSTEOARTHRITIS OF RIGHT KNEE: ICD-10-CM

## 2022-08-11 DIAGNOSIS — T84.82XS ARTHROFIBROSIS OF TOTAL KNEE ARTHROPLASTY, SEQUELA: Primary | ICD-10-CM

## 2022-08-11 PROCEDURE — 20610 LARGE JOINT ASPIRATION/INJECTION: R KNEE: ICD-10-PCS | Mod: 79,RT,, | Performed by: ORTHOPAEDIC SURGERY

## 2022-08-11 PROCEDURE — 99024 POSTOP FOLLOW-UP VISIT: CPT | Mod: POP,,, | Performed by: ORTHOPAEDIC SURGERY

## 2022-08-11 PROCEDURE — 99024 PR POST-OP FOLLOW-UP VISIT: ICD-10-PCS | Mod: POP,,, | Performed by: ORTHOPAEDIC SURGERY

## 2022-08-11 PROCEDURE — 20610 DRAIN/INJ JOINT/BURSA W/O US: CPT | Mod: 79,RT,, | Performed by: ORTHOPAEDIC SURGERY

## 2022-08-11 RX ORDER — SODIUM CHLORIDE 9 MG/ML
INJECTION, SOLUTION INTRAVENOUS CONTINUOUS
Status: CANCELLED | OUTPATIENT
Start: 2022-08-11

## 2022-08-11 NOTE — H&P (VIEW-ONLY)
Past Medical History:   Diagnosis Date    Acid reflux     CLL (chronic lymphocytic leukemia)     Hypertension        Past Surgical History:   Procedure Laterality Date    BREAST SURGERY  1991?    CARPAL TUNNEL RELEASE      CYST REMOVAL      JOINT REPLACEMENT  07/21    Left knee    KNEE SURGERY      lumpectomy Left     bening breast lumpectomy    REVISION OF KNEE ARTHROPLASTY Left 06/08/2022    Procedure: REVISION, ARTHROPLASTY, KNEE;  Surgeon: Sohan Bowens MD;  Location: [facility];  Service: Orthopedics;  Laterality: Left;  FEMUR / PATHOLOGY / ASHLEY    SHOULDER SURGERY      SINUS SURGERY      spinal injections      SPINE SURGERY  05/21    Bulging disc in lower back    THYROID SURGERY      TONSILLECTOMY         Current Outpatient Medications   Medication Sig    amLODIPine (NORVASC) 5 MG tablet Take 1 tablet (5 mg total) by mouth every evening.    B-complex with vitamin C (Z-BEC OR EQUIV) tablet Take 1 tablet by mouth once daily.    cholecalciferol, vitamin D3, 125 mcg (5,000 unit) capsule Take 5,000 Units by mouth.    lisdexamfetamine (VYVANSE) 70 MG capsule Take 70 mg by mouth.    metoprolol tartrate (LOPRESSOR) 50 MG tablet Take 75 mg by mouth.    pantoprazole (PROTONIX) 40 MG tablet Take 40 mg by mouth.    polysaccharide iron complex 200 mg iron Cap Take 200 mg by mouth.    pramipexole (MIRAPEX) 1.5 MG tablet Take 1.5 mg by mouth 2 (two) times a day.    telmisartan-hydrochlorothiazide (MICARDIS HCT) 40-12.5 mg per tablet Take by mouth.    vitamin B complex (B COMPLEX 1 ORAL)     aspirin (ECOTRIN) 81 MG EC tablet Take 1 tablet (81 mg total) by mouth every 12 (twelve) hours.     No current facility-administered medications for this visit.       Review of patient's allergies indicates:   Allergen Reactions    Duloxetine      Other reaction(s): Bradycardia, Hypotension, Vomit    Lorazepam      Other reaction(s): Confusion  amnesia      Tramadol Other (See Comments)     nightmares        Family History   Problem Relation Age of Onset    Hypertension Mother     COPD Mother     Hearing loss Mother     Hypertension Father     COPD Father     Hearing loss Father     Breast cancer Sister     Cancer Sister     Diabetes Maternal Grandmother         Reina    Diabetes Paternal Grandmother        Social History     Socioeconomic History    Marital status:    Tobacco Use    Smoking status: Former Smoker     Packs/day: 1.00     Years: 10.00     Pack years: 10.00     Types: Cigarettes     Quit date: 3/18/1986     Years since quittin.4    Smokeless tobacco: Former User   Substance and Sexual Activity    Alcohol use: Not Currently    Drug use: Never    Sexual activity: Yes     Partners: Male     Birth control/protection: Post-menopausal       Chief Complaint:   Chief Complaint   Patient presents with    Injections     Right knee zilretta inj    Follow-up     2 week F/u Left TKA on 22-22, pt states knee is about the same, hasn't been improving much, pt is still having some pain especialy after sitting for so long        History of present illness:  71-year-old female presents today for follow-up of right knee osteoarthritis as well as left total knee arthroplasty.  Patient is is here today for injection in the right knee.  She is also 2 months out from her revision of her left knee.  Left knee is better than preop although she continues to have issues with stiffness.      Review of Systems:    Constitution: Negative for chills, fever, and sweats.  Negative for unexplained weight loss.    HENT:  Negative for headaches and blurry vision.    Cardiovascular:Negative for chest pain or irregular heart beat. Negative for hypertension.    Respiratory:  Negative for cough and shortness of breath.    Gastrointestinal: Negative for abdominal pain, heartburn, melena, nausea, and vomitting.    Genitourinary:  Negative bladder incontinence and dysuria.    Musculoskeletal:  See  HPI    Neurological: Negative for numbness.    Psychiatric/Behavioral: Negative for depression.  The patient is not nervous/anxious.      Endocrine: Negative for polyuria    Hematologic/Lymphatic: Negative for bleeding problem.  Does not bruise/bleed easily.    Skin: Negative for poor would healing and rash      Physical Examination:    Vital Signs:  There were no vitals filed for this visit.    Body mass index is 36.33 kg/m².    General: No acute distress, alert and oriented, healthy appearing    HEENT: Head is atraumatic, mucous membranes are moist    Neck: Supples, no JVD    Cardiovascular: Palpable dorsalis pedis and posterior tibial pulses, regular rate and rhythm to those pulses    Lungs: Breathing non-labored    Skin: no rashes appreciated    Neurologic: Can flex and extend knees, ankles, and toes. Sensation is grossly intact    Left knee:  Sensation intact distally.  Brisk cap refill distally.  Range of motion of the left knee with significant discomfort.  She can get to extension of 10.  Flexion  Of 90  Right knee:  Crepitus throughout range of motion.  Brisk cap refill distally.  Sensation and distally.  Patient has extension of 5.  Flexion of 110    X-rays:      Assessment::  Right knee osteoarthritis  Left knee arthrofibrosis    Plan:  Plan for injection today of long-acting steroid in the right knee.  We also discussed the risk, benefits, alternatives with arthrofibrosis of the left knee.  Will plan for manipulation of the left knee in late August.    This note was created using [employer] voice recognition software that occasionally misinterpreted phrases or words.    Consult note is delivered via Epic messaging service.

## 2022-08-11 NOTE — PROGRESS NOTES
Past Medical History:   Diagnosis Date    Acid reflux     CLL (chronic lymphocytic leukemia)     Hypertension        Past Surgical History:   Procedure Laterality Date    BREAST SURGERY  1991?    CARPAL TUNNEL RELEASE      CYST REMOVAL      JOINT REPLACEMENT  07/21    Left knee    KNEE SURGERY      lumpectomy Left     bening breast lumpectomy    REVISION OF KNEE ARTHROPLASTY Left 06/08/2022    Procedure: REVISION, ARTHROPLASTY, KNEE;  Surgeon: Sohan Bowens MD;  Location: [facility];  Service: Orthopedics;  Laterality: Left;  FEMUR / PATHOLOGY / ASHLEY    SHOULDER SURGERY      SINUS SURGERY      spinal injections      SPINE SURGERY  05/21    Bulging disc in lower back    THYROID SURGERY      TONSILLECTOMY         Current Outpatient Medications   Medication Sig    amLODIPine (NORVASC) 5 MG tablet Take 1 tablet (5 mg total) by mouth every evening.    B-complex with vitamin C (Z-BEC OR EQUIV) tablet Take 1 tablet by mouth once daily.    cholecalciferol, vitamin D3, 125 mcg (5,000 unit) capsule Take 5,000 Units by mouth.    lisdexamfetamine (VYVANSE) 70 MG capsule Take 70 mg by mouth.    metoprolol tartrate (LOPRESSOR) 50 MG tablet Take 75 mg by mouth.    pantoprazole (PROTONIX) 40 MG tablet Take 40 mg by mouth.    polysaccharide iron complex 200 mg iron Cap Take 200 mg by mouth.    pramipexole (MIRAPEX) 1.5 MG tablet Take 1.5 mg by mouth 2 (two) times a day.    telmisartan-hydrochlorothiazide (MICARDIS HCT) 40-12.5 mg per tablet Take by mouth.    vitamin B complex (B COMPLEX 1 ORAL)     aspirin (ECOTRIN) 81 MG EC tablet Take 1 tablet (81 mg total) by mouth every 12 (twelve) hours.     No current facility-administered medications for this visit.       Review of patient's allergies indicates:   Allergen Reactions    Duloxetine      Other reaction(s): Bradycardia, Hypotension, Vomit    Lorazepam      Other reaction(s): Confusion  amnesia      Tramadol Other (See Comments)     nightmares        Family History   Problem Relation Age of Onset    Hypertension Mother     COPD Mother     Hearing loss Mother     Hypertension Father     COPD Father     Hearing loss Father     Breast cancer Sister     Cancer Sister     Diabetes Maternal Grandmother         Reina    Diabetes Paternal Grandmother        Social History     Socioeconomic History    Marital status:    Tobacco Use    Smoking status: Former Smoker     Packs/day: 1.00     Years: 10.00     Pack years: 10.00     Types: Cigarettes     Quit date: 3/18/1986     Years since quittin.4    Smokeless tobacco: Former User   Substance and Sexual Activity    Alcohol use: Not Currently    Drug use: Never    Sexual activity: Yes     Partners: Male     Birth control/protection: Post-menopausal       Chief Complaint:   Chief Complaint   Patient presents with    Injections     Right knee zilretta inj    Follow-up     2 week F/u Left TKA on 22-22, pt states knee is about the same, hasn't been improving much, pt is still having some pain especialy after sitting for so long        History of present illness:  71-year-old female presents today for follow-up of right knee osteoarthritis as well as left total knee arthroplasty.  Patient is is here today for injection in the right knee.  She is also 2 months out from her revision of her left knee.  Left knee is better than preop although she continues to have issues with stiffness.      Review of Systems:    Constitution: Negative for chills, fever, and sweats.  Negative for unexplained weight loss.    HENT:  Negative for headaches and blurry vision.    Cardiovascular:Negative for chest pain or irregular heart beat. Negative for hypertension.    Respiratory:  Negative for cough and shortness of breath.    Gastrointestinal: Negative for abdominal pain, heartburn, melena, nausea, and vomitting.    Genitourinary:  Negative bladder incontinence and dysuria.    Musculoskeletal:  See  HPI    Neurological: Negative for numbness.    Psychiatric/Behavioral: Negative for depression.  The patient is not nervous/anxious.      Endocrine: Negative for polyuria    Hematologic/Lymphatic: Negative for bleeding problem.  Does not bruise/bleed easily.    Skin: Negative for poor would healing and rash      Physical Examination:    Vital Signs:  There were no vitals filed for this visit.    Body mass index is 36.33 kg/m².    General: No acute distress, alert and oriented, healthy appearing    HEENT: Head is atraumatic, mucous membranes are moist    Neck: Supples, no JVD    Cardiovascular: Palpable dorsalis pedis and posterior tibial pulses, regular rate and rhythm to those pulses    Lungs: Breathing non-labored    Skin: no rashes appreciated    Neurologic: Can flex and extend knees, ankles, and toes. Sensation is grossly intact    Left knee:  Sensation intact distally.  Brisk cap refill distally.  Range of motion of the left knee with significant discomfort.  She can get to extension of 10.  Flexion  Of 90  Right knee:  Crepitus throughout range of motion.  Brisk cap refill distally.  Sensation and distally.  Patient has extension of 5.  Flexion of 110    X-rays:      Assessment::  Right knee osteoarthritis  Left knee arthrofibrosis    Plan:  Plan for injection today of long-acting steroid in the right knee.  We also discussed the risk, benefits, alternatives with arthrofibrosis of the left knee.  Will plan for manipulation of the left knee in late August.    This note was created using [employer] voice recognition software that occasionally misinterpreted phrases or words.    Consult note is delivered via Epic messaging service.

## 2022-08-11 NOTE — PROCEDURES
Large Joint Aspiration/Injection: R knee    Date/Time: 8/11/2022 1:30 PM  Performed by: Sohan Bowens MD  Authorized by: Sohan Bowens MD     Consent Done?:  Yes (Verbal)  Indications:  Arthritis and joint swelling  Timeout: prior to procedure the correct patient, procedure, and site was verified    Prep: patient was prepped and draped in usual sterile fashion      Details:  Needle Size:  21 G  Approach:  Anterolateral  Location:  Knee  Site:  R knee  Medications:  32 mg triamcinolone acetonide 32 mg  Patient tolerance:  Patient tolerated the procedure well with no immediate complications

## 2022-08-26 ENCOUNTER — ANESTHESIA EVENT (OUTPATIENT)
Dept: SURGERY | Facility: HOSPITAL | Age: 72
End: 2022-08-26
Payer: MEDICARE

## 2022-08-29 ENCOUNTER — ANESTHESIA (OUTPATIENT)
Dept: SURGERY | Facility: HOSPITAL | Age: 72
End: 2022-08-29
Payer: MEDICARE

## 2022-08-29 ENCOUNTER — HOSPITAL ENCOUNTER (OUTPATIENT)
Facility: HOSPITAL | Age: 72
Discharge: HOME | End: 2022-08-29
Attending: ORTHOPAEDIC SURGERY | Admitting: ORTHOPAEDIC SURGERY
Payer: MEDICARE

## 2022-08-29 DIAGNOSIS — T84.82XS ARTHROFIBROSIS OF TOTAL KNEE ARTHROPLASTY, SEQUELA: ICD-10-CM

## 2022-08-29 DIAGNOSIS — M17.11 PRIMARY OSTEOARTHRITIS OF RIGHT KNEE: ICD-10-CM

## 2022-08-29 PROCEDURE — 37000008 HC ANESTHESIA 1ST 15 MINUTES: Performed by: ORTHOPAEDIC SURGERY

## 2022-08-29 PROCEDURE — 27570 PR MANIPULATN KNEE JT+ANESTHESIA: ICD-10-PCS | Mod: 78,LT,, | Performed by: ORTHOPAEDIC SURGERY

## 2022-08-29 PROCEDURE — 27570 FIXATION OF KNEE JOINT: CPT | Mod: 78,LT,, | Performed by: ORTHOPAEDIC SURGERY

## 2022-08-29 PROCEDURE — 63600175 PHARM REV CODE 636 W HCPCS: Performed by: NURSE ANESTHETIST, CERTIFIED REGISTERED

## 2022-08-29 PROCEDURE — 27570 FIXATION OF KNEE JOINT: CPT | Performed by: ORTHOPAEDIC SURGERY

## 2022-08-29 RX ORDER — LIDOCAINE HYDROCHLORIDE 10 MG/ML
1 INJECTION, SOLUTION EPIDURAL; INFILTRATION; INTRACAUDAL; PERINEURAL ONCE
Status: CANCELLED | OUTPATIENT
Start: 2022-08-29 | End: 2022-08-29

## 2022-08-29 RX ORDER — PROPOFOL 10 MG/ML
VIAL (ML) INTRAVENOUS
Status: DISCONTINUED | OUTPATIENT
Start: 2022-08-29 | End: 2022-08-29

## 2022-08-29 RX ORDER — ACETAMINOPHEN 10 MG/ML
1000 INJECTION, SOLUTION INTRAVENOUS ONCE
Status: CANCELLED | OUTPATIENT
Start: 2022-08-29 | End: 2022-08-29

## 2022-08-29 RX ORDER — SODIUM CHLORIDE 9 MG/ML
INJECTION, SOLUTION INTRAVENOUS CONTINUOUS
Status: DISCONTINUED | OUTPATIENT
Start: 2022-08-29 | End: 2022-08-29 | Stop reason: HOSPADM

## 2022-08-29 RX ORDER — PROPOFOL 10 MG/ML
INJECTION, EMULSION INTRAVENOUS
Status: COMPLETED
Start: 2022-08-29 | End: 2022-08-29

## 2022-08-29 RX ORDER — MIDAZOLAM HYDROCHLORIDE 1 MG/ML
2 INJECTION INTRAMUSCULAR; INTRAVENOUS ONCE AS NEEDED
Status: CANCELLED | OUTPATIENT
Start: 2022-08-29 | End: 2034-01-24

## 2022-08-29 RX ORDER — HYDROMORPHONE HYDROCHLORIDE 1 MG/ML
0.5 INJECTION, SOLUTION INTRAMUSCULAR; INTRAVENOUS; SUBCUTANEOUS EVERY 5 MIN PRN
Status: DISCONTINUED | OUTPATIENT
Start: 2022-08-29 | End: 2022-08-29 | Stop reason: HOSPADM

## 2022-08-29 RX ORDER — LIDOCAINE HYDROCHLORIDE 10 MG/ML
INJECTION INFILTRATION; PERINEURAL
Status: DISCONTINUED
Start: 2022-08-29 | End: 2022-08-29 | Stop reason: WASHOUT

## 2022-08-29 RX ORDER — ONDANSETRON 2 MG/ML
4 INJECTION INTRAMUSCULAR; INTRAVENOUS DAILY PRN
Status: CANCELLED | OUTPATIENT
Start: 2022-08-29

## 2022-08-29 RX ORDER — BETAMETHASONE SODIUM PHOSPHATE AND BETAMETHASONE ACETATE 3; 3 MG/ML; MG/ML
INJECTION, SUSPENSION INTRA-ARTICULAR; INTRALESIONAL; INTRAMUSCULAR; SOFT TISSUE
Status: DISCONTINUED
Start: 2022-08-29 | End: 2022-08-29 | Stop reason: WASHOUT

## 2022-08-29 RX ORDER — SODIUM CHLORIDE, SODIUM GLUCONATE, SODIUM ACETATE, POTASSIUM CHLORIDE AND MAGNESIUM CHLORIDE 30; 37; 368; 526; 502 MG/100ML; MG/100ML; MG/100ML; MG/100ML; MG/100ML
1000 INJECTION, SOLUTION INTRAVENOUS CONTINUOUS
Status: CANCELLED | OUTPATIENT
Start: 2022-08-29 | End: 2022-09-28

## 2022-08-29 RX ORDER — DIPHENHYDRAMINE HYDROCHLORIDE 50 MG/ML
25 INJECTION INTRAMUSCULAR; INTRAVENOUS EVERY 6 HOURS PRN
Status: CANCELLED | OUTPATIENT
Start: 2022-08-29

## 2022-08-29 RX ORDER — HYDROMORPHONE HYDROCHLORIDE 2 MG/ML
0.2 INJECTION, SOLUTION INTRAMUSCULAR; INTRAVENOUS; SUBCUTANEOUS EVERY 5 MIN PRN
Status: CANCELLED | OUTPATIENT
Start: 2022-08-29

## 2022-08-29 RX ADMIN — PROPOFOL 100 MG: 10 INJECTION, EMULSION INTRAVENOUS at 06:08

## 2022-08-29 NOTE — TRANSFER OF CARE
Anesthesia Transfer of Care Note    Patient: Amita Trejo    Procedure(s) Performed: Procedure(s) (LRB):  MANIPULATION, KNEE (Left)    Patient location: PACU    Anesthesia Type: general    Transport from OR: Transported from OR on room air with adequate spontaneous ventilation    Post pain: adequate analgesia    Post assessment: no apparent anesthetic complications    Post vital signs: stable    Level of consciousness: sedated    Nausea/Vomiting: no nausea/vomiting    Complications: none    Transfer of care protocol was followed      Last vitals:   Visit Vitals  BP (!) 144/95   Pulse 68   Temp 36.7 °C (98.1 °F)   Resp 16   Ht 5' (1.524 m)   Wt 75.6 kg (166 lb 10.7 oz)   SpO2 100%   Breastfeeding No   BMI 32.55 kg/m²

## 2022-08-29 NOTE — DISCHARGE SUMMARY
[facility] Orthopaedics - Periop Services  Orthopedics  Discharge Summary      Patient Name: Amita Trejo  MRN: [id number]  Admission Date: 8/29/2022  Hospital Length of Stay: 0 days  Discharge Date and Time:  08/29/2022 6:46 AM  Attending Physician: Sohan Bowens MD   Discharging Provider: Sohan Bowens MD  Primary Care Provider: Tyesha Verma MD    HPI:   No notes on file    Procedure(s) (LRB):  MANIPULATION, KNEE (Left)      Hospital Course:  No notes on file    Goals of Care Treatment Preferences:  Code Status: Full Code          Significant Diagnostic Studies: No pertinent studies.    Pending Diagnostic Studies:     None        Final Active Diagnoses:    Diagnosis Date Noted POA    PRINCIPAL PROBLEM:  Arthrofibrosis of total knee arthroplasty, sequela [T84.82XS] 05/27/2022 Not Applicable      Problems Resolved During this Admission:      Discharged Condition: good    Disposition: Home or Self Care    Follow Up:   Follow-up Information     Sohan Bowens MD Follow up in 6 week(s).    Specialty: Orthopedic Surgery  Contact information:  [address] [place] [address]  [address] [place] [id number]  [phone]                       Patient Instructions:   No discharge procedures on file.  Medications:  Reconciled Home Medications:      Medication List      CONTINUE taking these medications    amLODIPine 5 MG tablet  Commonly known as: NORVASC  Take 1 tablet (5 mg total) by mouth every evening.     aspirin 81 MG EC tablet  Commonly known as: ECOTRIN  Take 1 tablet (81 mg total) by mouth every 12 (twelve) hours.     B COMPLEX 1 ORAL     B-complex with vitamin C tablet  Commonly known as: Z-Bec or Equiv  Take 1 tablet by mouth once daily.     cholecalciferol (vitamin D3) 125 mcg (5,000 unit) capsule  Take 5,000 Units by mouth once daily.     lisdexamfetamine 70 MG capsule  Commonly known as: VYVANSE  Take 70 mg by mouth every morning.     metoprolol tartrate 50 MG tablet  Commonly known as:  LOPRESSOR  Take 75 mg by mouth once daily at 6am.     pantoprazole 40 MG tablet  Commonly known as: PROTONIX  Take 40 mg by mouth once daily.     polysaccharide iron complex 200 mg iron Cap  Take 200 mg by mouth once daily.     pramipexole 1.5 MG tablet  Commonly known as: MIRAPEX  Take 1.5 mg by mouth 2 (two) times a day.     telmisartan-hydrochlorothiazide 40-12.5 mg per tablet  Commonly known as: MICARDIS HCT  Take 1 tablet by mouth once daily.        STOP taking these medications    pregabalin 75 MG capsule  Commonly known as: EMELI Bowens MD  Orthopedics  [facility] Orthopaedics - Periop Services

## 2022-08-29 NOTE — OP NOTE
DATE: 8/29/22  SURGEON: Sohan Bowens  PROCEDURE: Closed Manipulation under anesthesia  PREOPERATIVE DIAGNOSIS: Arthrofibrosis left Knee  POST-OPERATIVE DIAGNOSIS: Arthrofibrosis left Knee  SPECIMEN: None  FINDINGS: Stiff Knee  INDICATIONS: Amita Trejo had a total knee performed 8 weeks ago.  she had difficulty regaining her motion. And after a discussion with the patient is was decided to proceed with a closed manipulation.  Her range of motion was 10-80    PROCEDURE IN DETAIL:  After appropriate consent was obtained the patient was briught to the operating room, where anesthesia was administered.  The hip was flexed and slow steady pressure was applied to Her leg.  There was an audible and palpable lysis of adhesions.  Flexion improved to 90 degrees.  Steady pressure was applied  in extension and her extension improved to 5.  The anesthesia was reversed and she was brought to the recovery.  She tolerated the procedure well and there were no complications.

## 2022-08-29 NOTE — ANESTHESIA PREPROCEDURE EVALUATION
08/29/2022  Amita Trejo is a 71 y.o. female with arthrofibrosis for knee manipulation under general anesthesia.  She denies cardiopulmonary complaints.      Pre-op Assessment    I have reviewed the Patient Summary Reports.     I have reviewed the Nursing Notes. I have reviewed the NPO Status.   I have reviewed the Medications.     Review of Systems  Anesthesia Hx:  No problems with previous Anesthesia    Cardiovascular:   Hypertension    Hepatic/GI:   GERD        Physical Exam  General: Well nourished and Cooperative    Airway:  Mallampati: II   Mouth Opening: Normal  TM Distance: Normal  Tongue: Normal  Neck ROM: Normal ROM    Dental:  Intact    Chest/Lungs:  Clear to auscultation    Heart:  Rate: Normal        Anesthesia Plan  Type of Anesthesia, risks & benefits discussed:    Anesthesia Type: Gen Natural Airway  Intra-op Monitoring Plan: Standard ASA Monitors  Post Op Pain Control Plan: multimodal analgesia  Induction:  IV  Informed Consent: Informed consent signed with the Patient and all parties understand the risks and agree with anesthesia plan.  All questions answered.   ASA Score: 2  Day of Surgery Review of History & Physical: H&P Update referred to the surgeon/provider.  Anesthesia Plan Notes: LMA general for backup as needed.      Ready For Surgery From Anesthesia Perspective.     .

## 2022-08-29 NOTE — INTERVAL H&P NOTE
I have seen the patient and reviewed this note, and there is no change in history and physical since the last exam.

## 2022-08-30 ENCOUNTER — TELEPHONE (OUTPATIENT)
Dept: SURGERY | Facility: HOSPITAL | Age: 72
End: 2022-08-30
Payer: MEDICARE

## 2022-08-30 VITALS
BODY MASS INDEX: 32.73 KG/M2 | HEIGHT: 60 IN | SYSTOLIC BLOOD PRESSURE: 134 MMHG | OXYGEN SATURATION: 100 % | RESPIRATION RATE: 20 BRPM | DIASTOLIC BLOOD PRESSURE: 75 MMHG | WEIGHT: 166.69 LBS | HEART RATE: 74 BPM | TEMPERATURE: 98 F

## 2022-08-30 DIAGNOSIS — T84.82XS ARTHROFIBROSIS OF TOTAL KNEE ARTHROPLASTY, SEQUELA: Primary | ICD-10-CM

## 2022-08-30 NOTE — ANESTHESIA POSTPROCEDURE EVALUATION
Anesthesia Post Evaluation    Patient: Amita Trejo    Procedure(s) Performed: Procedure(s) (LRB):  MANIPULATION, KNEE (Left)    Final Anesthesia Type: general      Patient location during evaluation: PACU  Patient participation: Yes- Able to Participate  Level of consciousness: awake and alert  Post-procedure vital signs: reviewed and stable  Pain management: adequate  Airway patency: patent      Anesthetic complications: no      Cardiovascular status: blood pressure returned to baseline  Respiratory status: unassisted  Hydration status: euvolemic  Follow-up not needed.          Vitals Value Taken Time   /78 08/29/22 0715   Temp 36.7 °C (98.1 °F) 08/29/22 0626   Pulse 74 08/29/22 0723   Resp 20 08/29/22 0723   SpO2 100 % 08/29/22 0723   Vitals shown include unvalidated device data.      Event Time   Out of Recovery 07:07:00         Pain/Luan Score: Luan Score: 9 (8/29/2022  7:06 AM)  Modified Luan Score: 20 (8/29/2022  7:31 AM)

## 2022-10-04 ENCOUNTER — OFFICE VISIT (OUTPATIENT)
Dept: ORTHOPEDICS | Facility: CLINIC | Age: 72
End: 2022-10-04
Payer: MEDICARE

## 2022-10-04 VITALS
WEIGHT: 166 LBS | HEIGHT: 60 IN | BODY MASS INDEX: 32.59 KG/M2 | HEART RATE: 66 BPM | DIASTOLIC BLOOD PRESSURE: 73 MMHG | SYSTOLIC BLOOD PRESSURE: 122 MMHG

## 2022-10-04 DIAGNOSIS — Z96.652 AFTERCARE FOLLOWING LEFT KNEE JOINT REPLACEMENT SURGERY: Primary | ICD-10-CM

## 2022-10-04 DIAGNOSIS — Z47.1 AFTERCARE FOLLOWING LEFT KNEE JOINT REPLACEMENT SURGERY: Primary | ICD-10-CM

## 2022-10-04 DIAGNOSIS — M17.11 PRIMARY OSTEOARTHRITIS OF RIGHT KNEE: ICD-10-CM

## 2022-10-04 DIAGNOSIS — M54.16 LUMBAR RADICULOPATHY: ICD-10-CM

## 2022-10-04 PROCEDURE — 99214 PR OFFICE/OUTPT VISIT, EST, LEVL IV, 30-39 MIN: ICD-10-PCS | Mod: ,,, | Performed by: ORTHOPAEDIC SURGERY

## 2022-10-04 PROCEDURE — 99214 OFFICE O/P EST MOD 30 MIN: CPT | Mod: ,,, | Performed by: ORTHOPAEDIC SURGERY

## 2022-10-04 RX ORDER — GABAPENTIN 300 MG/1
300 CAPSULE ORAL 3 TIMES DAILY
Qty: 42 CAPSULE | Refills: 0 | Status: SHIPPED | OUTPATIENT
Start: 2022-10-04 | End: 2023-05-02

## 2022-10-04 NOTE — PROGRESS NOTES
Past Medical History:   Diagnosis Date    Acid reflux     Arthritis     CLL (chronic lymphocytic leukemia)     Hypertension        Past Surgical History:   Procedure Laterality Date    BREAST SURGERY Left 1991?    lumpectomy    CARPAL TUNNEL RELEASE      CYST REMOVAL      EPIDURAL STEROID INJECTION      JOINT REPLACEMENT  07/21    Left knee    KNEE JOINT MANIPULATION Left 8/29/2022    Procedure: MANIPULATION, KNEE;  Surgeon: Sohan Bowens MD;  Location: [facility];  Service: Orthopedics;  Laterality: Left;    REVISION OF KNEE ARTHROPLASTY Left 06/08/2022    Procedure: REVISION, ARTHROPLASTY, KNEE;  Surgeon: Sohan Bowens MD;  Location: [facility] OR;  Service: Orthopedics;  Laterality: Left;  FEMUR / PATHOLOGY / ASHLEY    SHOULDER SURGERY      SINUS SURGERY      x 2    SPINE SURGERY  05/21    Bulging disc in lower back    THYROID SURGERY      TONSILLECTOMY         Current Outpatient Medications   Medication Sig    amLODIPine (NORVASC) 5 MG tablet Take 1 tablet (5 mg total) by mouth every evening.    aspirin (ECOTRIN) 81 MG EC tablet Take 1 tablet (81 mg total) by mouth every 12 (twelve) hours.    B-complex with vitamin C (Z-BEC OR EQUIV) tablet Take 1 tablet by mouth once daily.    cholecalciferol, vitamin D3, 125 mcg (5,000 unit) capsule Take 5,000 Units by mouth once daily.    lisdexamfetamine (VYVANSE) 70 MG capsule Take 70 mg by mouth every morning.    metoprolol tartrate (LOPRESSOR) 50 MG tablet Take 75 mg by mouth once daily at 6am.    pantoprazole (PROTONIX) 40 MG tablet Take 40 mg by mouth once daily.    polysaccharide iron complex 200 mg iron Cap Take 200 mg by mouth once daily.    pramipexole (MIRAPEX) 1.5 MG tablet Take 1.5 mg by mouth 2 (two) times a day.    telmisartan-hydrochlorothiazide (MICARDIS HCT) 40-12.5 mg per tablet Take 1 tablet by mouth once daily.    vitamin B complex (B COMPLEX 1 ORAL)      No current facility-administered medications for this visit.       Review of patient's allergies  indicates:   Allergen Reactions    Duloxetine      Other reaction(s): Bradycardia, Hypotension, Vomit    Lorazepam      Other reaction(s): Confusion  amnesia      Tramadol Other (See Comments)     nightmares       Family History   Problem Relation Age of Onset    Hypertension Mother     COPD Mother     Hearing loss Mother     Hypertension Father     COPD Father     Hearing loss Father     Breast cancer Sister     Cancer Sister     Diabetes Maternal Grandmother         Reina    Diabetes Paternal Grandmother        Social History     Socioeconomic History    Marital status:    Tobacco Use    Smoking status: Former     Packs/day: 1.00     Years: 10.00     Pack years: 10.00     Types: Cigarettes     Quit date: 3/18/1986     Years since quittin.5    Smokeless tobacco: Former   Substance and Sexual Activity    Alcohol use: Not Currently    Drug use: Never    Sexual activity: Yes     Partners: Male     Birth control/protection: Post-menopausal       Chief Complaint:   Chief Complaint   Patient presents with    Left Knee - Post-op Evaluation     Left knee manipulation follow-up on 22. Knee hasn't been great. Has more pain in leg than in knee - knee is stiff. Not able to bend it much and ROM isn't too good.        History of present illness: Amita Trejo is a 72 y.o. female, presents to clinic today status post revision of left total knee arthroplasty with a manipulation. Ambulating with a cane. Denies any significant pain to the knee. Still has some stiffness with no increase in ROM. States at PT was able to get to 93 degrees passively. Does state the knee is overall better than prior to surgery. Does complain of some sciatic pain to the left buttocks that radiates down.      Review of Systems:    Denies fevers, chills, chest pain, shortness of breath. Comprehensive review of systems performed and otherwise negative except as noted in HPI      Physical Examination:    General: awake and alert, no acute  distress, healthy appearing  Head and Neck: Head atraumatic/normocephalic. Moist MM  CV: brisk cap refill  Lungs: non-labored breathing, w/o cough or SOB  Skin: no rashes present, warm to touch  Neuro: sensation grossly intact distall     Vital Signs:    Vitals:    10/04/22 1008   BP: 122/73   Pulse: 66       Body mass index is 32.42 kg/m².    Examination left knee:    Incision clean dry and intact. No erythema or drainage or signs of infection.  Sensation intact distally to left foot  Positive FHL/EHL/gastrocsoleus/tib ant  Brisk capillary refill to left foot  No swelling or signs of DVT  Range of motion: extension at 5 and flexion at 90  Stable to varus valgus  Stable to anterior and posterior drawer       Assessment::post op s/p revision of L TKA with a manipulation    Plan:  Patient presents for follow up after manipulation for revision of left TKA. Overall she has had minimal improvement with ROM. Her knee is stable upon examination today. Encouraged to continue for PT for ROM, strengthening and mobility and to help with sciatic pain. Will give her another prescription for her back. Follow-up in 6-8 weeks for repeat evaluation of her back and a Zilretta injection in the right knee. All questions and concerns were addressed. The patient understands and agrees with the plan of care.      This note was created using [employer] voice recognition software that occasionally misinterpreted phrases or words.    Consult note is delivered via Epic messaging service.

## 2022-11-01 ENCOUNTER — OFFICE VISIT (OUTPATIENT)
Dept: HEMATOLOGY/ONCOLOGY | Facility: CLINIC | Age: 72
End: 2022-11-01
Payer: MEDICARE

## 2022-11-01 VITALS
WEIGHT: 177.38 LBS | HEART RATE: 72 BPM | SYSTOLIC BLOOD PRESSURE: 125 MMHG | OXYGEN SATURATION: 100 % | TEMPERATURE: 98 F | DIASTOLIC BLOOD PRESSURE: 76 MMHG | RESPIRATION RATE: 18 BRPM | BODY MASS INDEX: 34.65 KG/M2

## 2022-11-01 DIAGNOSIS — C83.00 LYMPHOMA, SMALL LYMPHOCYTIC: Primary | ICD-10-CM

## 2022-11-01 PROCEDURE — 99214 OFFICE O/P EST MOD 30 MIN: CPT | Mod: ,,, | Performed by: INTERNAL MEDICINE

## 2022-11-01 PROCEDURE — 99214 PR OFFICE/OUTPT VISIT, EST, LEVL IV, 30-39 MIN: ICD-10-PCS | Mod: ,,, | Performed by: INTERNAL MEDICINE

## 2022-11-01 NOTE — PROGRESS NOTES
DATE:  11/01/2022    PROBLEM:  Early stage asymptomatic SLL/CLL    HxPI:   72 y/o F w/ PMHx of ELI on CPAP, mitral valve prolapse, HTN, ADHD, neuropathy, DJD referred to [facility] for newly diagnosed stage II SLL  She had routine screening MMG 2/2021 that showed some mildly enlarged b/l axillary nodes. She had b/l axillary US with Dr Lugo 3/2021 most suggestive of inflammation. Repeat US of right axilla obtained 6/2021 that showed some borderline enlarged nodes with benign sonographic appearance. She had right axillary LN biopsy showing SLL/CLL. Colonoscopy Dr Kwan 2016, negative per pt    INTERVAL Hx:  11/01/2022  Patient denies any B symptoms biggest issue is that she is having bilateral knee pain.  She is had new replacement x2 and she states that neither procedures been successful.  She is forced to walk with a cane now.    PMHx: ELI on CPAP, mitral valve prolapse, HTN, ADHD, neuropathy, DJD  PSHx: tonsillectomy, sinus surgery x2, left breast lumpectomy (benign), torn meniscus right x2, thyroglossal duct cyst, right shoulder rotator cuff, back surgery  Social Hx: former smoker quit in 1986, 10 pack year, no ETOH, no drugs  Family Hx: sister: DCIS and melanoma, paternal grandfather: head and neck cancer (not a smoker)  Meds: reviewed  Allergies: ativan, cymbalta    LABS:  10/26/2022:  WBC 10.4, hemoglobin 11.9, hematocrit 36.5, platelet count 212 K  07/06/2022: Cr 0.88, Alb 3.8, TP 7.1, , Uric acid 5.5, WBC 10.61, Hgb 10.5, , ANC 2.90, ALC 6.90  03/03/2022: WBC 13.5 RBC 3.74 Hg 11.2 MCV 94 rdw 13.8  ANC 3.4 ALC 8.9 Cr 1.12 Alb 4.4 Ca 9.4     IMAGING:  3/29/22 MMG prominent nodes in each axilla. Some nodes have minimally increased in size.     6/14/21 US axilla unilateral: 6 right axillary nodes, 2 are borderline enlarged measuring 2.4x1.7x1.3cm and 2.3x1.3x1.1cm. Benign sonographic appearance    2/18/21 CT N/C/A/P w/ contrast: no cervical adenopathy. Small thyroid nodules. No acute  pulm disease. Small focus of nodular pleural thickening in fissure of left lung stable since 2019. Increased size of several borderline enlarged axillary LNs b/l in comparison to 2019 although this finding is nonspecific. Simple renal cysts on both kidneys increased in size compared to 2018 but not suspicious for malignancy    2/5/21 screening MMG: [id number]. Small LN in upper and outer quadrant of left breast increased slightly in size from prior exams. Multiple LN within each axilla larger and more dense than on prior exam. Lymphoma should be considered    PATH:  6/16/21 right axillary LN biopsy: atypical lymphoproliferative process consistent with involvement by CLL/SLL.  Flow: abnormal B cells 82% of lymphs, CD19+, CD20+ dim, CD5+, CD23+ variable, CD11c partial+, CD38-, lambda +dim  FISH: del13q/-13 detected. del17p, t(11:14), trisomy 12, del11q and del6q negative.    ROS:   -GENERAL:  Patient denies fevers, chills, weight loss.  -HEENT:  No recent change in vision, hearing, taste or smell.  -LUNGS:  Patient denies cough, sputum production, hemoptysis, or shortness of breath.  -COR:  Patient denies chest pain or palpitations.  -BREAST/CHEST WAll:  Patient denies breast masses or chest wall abnormalities.  -GI:.  Patient denies nausea, vomiting, abdominal pain, black or bloody bowel movements, change in bowel habits.  -:  Patient denies dysuria, hematuria, or lower tract obstructive symptomatology  -DERM:  Patient denies skin lesions.  -EXT:  Chronic lower extremity joint pain secondary to DJD.    OBSERVATIONS:  -GENERAL:  Alert, oriented, appears comfortable at rest.  -VS:  Afebrile.  125/76  -HEENT:  PERRLA.  EOMI.  Nasal/oropharynx benign.  -LUNGS:  Clear to auscultation and percussion  -COR:  Regular rate rhythm without murmurs, rubs, heaves.  -BREAST/CHEST WAll:  Normal breasts/chest wall region without palpable masses  -ABD:  Soft, nontender, positive bowel sounds without masses or visceromegaly.  -LYMPH:   No palpable peripheral lymphadenopathy.  -EXT:  Without clubbing, cyanosis, or edema.    ASSESSMENT:  Asymptomatic early stage CLL/SLL.    PLAN:   Patient instructed to stay up-to-date on flu vaccine on a yearly basis.  As patient is early stage disease in fact from CLL standpoint, ROBINS 0, we will stretch out next visit to six-month with CBC, CMP, LDH on RTC.  Patient knows to look out for B symptoms.  In notify us if anything changes between now and her next scheduled visit and we can always get her in early for a repeat evaluation if necessary.    GUERRERO GONZALEZ M.D., FACP

## 2022-11-29 ENCOUNTER — OFFICE VISIT (OUTPATIENT)
Dept: ORTHOPEDICS | Facility: CLINIC | Age: 72
End: 2022-11-29
Payer: MEDICARE

## 2022-11-29 VITALS
WEIGHT: 177 LBS | BODY MASS INDEX: 34.75 KG/M2 | DIASTOLIC BLOOD PRESSURE: 70 MMHG | TEMPERATURE: 98 F | HEART RATE: 71 BPM | SYSTOLIC BLOOD PRESSURE: 125 MMHG | HEIGHT: 60 IN

## 2022-11-29 DIAGNOSIS — M17.11 PRIMARY OSTEOARTHRITIS OF RIGHT KNEE: ICD-10-CM

## 2022-11-29 DIAGNOSIS — Z96.652 AFTERCARE FOLLOWING LEFT KNEE JOINT REPLACEMENT SURGERY: Primary | ICD-10-CM

## 2022-11-29 DIAGNOSIS — Z47.1 AFTERCARE FOLLOWING LEFT KNEE JOINT REPLACEMENT SURGERY: Primary | ICD-10-CM

## 2022-11-29 PROCEDURE — 99213 OFFICE O/P EST LOW 20 MIN: CPT | Mod: 25,,, | Performed by: ORTHOPAEDIC SURGERY

## 2022-11-29 PROCEDURE — 99213 PR OFFICE/OUTPT VISIT, EST, LEVL III, 20-29 MIN: ICD-10-PCS | Mod: 25,,, | Performed by: ORTHOPAEDIC SURGERY

## 2022-11-29 PROCEDURE — 20610 DRAIN/INJ JOINT/BURSA W/O US: CPT | Mod: RT,,, | Performed by: ORTHOPAEDIC SURGERY

## 2022-11-29 PROCEDURE — 20610 LARGE JOINT ASPIRATION/INJECTION: R KNEE: ICD-10-PCS | Mod: RT,,, | Performed by: ORTHOPAEDIC SURGERY

## 2022-11-29 NOTE — PROGRESS NOTES
Past Medical History:   Diagnosis Date    Acid reflux     Arthritis     CLL (chronic lymphocytic leukemia)     Hypertension        Past Surgical History:   Procedure Laterality Date    BREAST SURGERY Left 1991?    lumpectomy    CARPAL TUNNEL RELEASE      CYST REMOVAL      EPIDURAL STEROID INJECTION      JOINT REPLACEMENT  07/21    Left knee    KNEE JOINT MANIPULATION Left 8/29/2022    Procedure: MANIPULATION, KNEE;  Surgeon: Sohan Bowens MD;  Location: [facility];  Service: Orthopedics;  Laterality: Left;    REVISION OF KNEE ARTHROPLASTY Left 06/08/2022    Procedure: REVISION, ARTHROPLASTY, KNEE;  Surgeon: Sohan Bowens MD;  Location: [facility] OR;  Service: Orthopedics;  Laterality: Left;  FEMUR / PATHOLOGY / ASHLEY    SHOULDER SURGERY      SINUS SURGERY      x 2    SPINE SURGERY  05/21    Bulging disc in lower back    THYROID SURGERY      TONSILLECTOMY         Current Outpatient Medications   Medication Sig    amLODIPine (NORVASC) 5 MG tablet Take 1 tablet (5 mg total) by mouth every evening.    B-complex with vitamin C (Z-BEC OR EQUIV) tablet Take 1 tablet by mouth once daily.    cholecalciferol, vitamin D3, 125 mcg (5,000 unit) capsule Take 5,000 Units by mouth once daily.    lisdexamfetamine (VYVANSE) 70 MG capsule Take 70 mg by mouth every morning.    metoprolol tartrate (LOPRESSOR) 50 MG tablet Take 75 mg by mouth once daily at 6am.    pantoprazole (PROTONIX) 40 MG tablet Take 40 mg by mouth once daily.    polysaccharide iron complex 200 mg iron Cap Take 200 mg by mouth once daily.    pramipexole (MIRAPEX) 1.5 MG tablet Take 1.5 mg by mouth 2 (two) times a day.    telmisartan-hydrochlorothiazide (MICARDIS HCT) 40-12.5 mg per tablet Take 1 tablet by mouth once daily.    vitamin B complex (B COMPLEX 1 ORAL)     aspirin (ECOTRIN) 81 MG EC tablet Take 1 tablet (81 mg total) by mouth every 12 (twelve) hours.    gabapentin (NEURONTIN) 300 MG capsule Take 1 capsule (300 mg total) by mouth 3 (three) times daily.  for 14 days     No current facility-administered medications for this visit.       Review of patient's allergies indicates:   Allergen Reactions    Duloxetine      Other reaction(s): Bradycardia, Hypotension, Vomit    Lorazepam      Other reaction(s): Confusion  amnesia      Tramadol Other (See Comments)     nightmares       Family History   Problem Relation Age of Onset    Hypertension Mother     COPD Mother     Hearing loss Mother     Hypertension Father     COPD Father     Hearing loss Father     Breast cancer Sister     Cancer Sister     Diabetes Maternal Grandmother         Reina    Diabetes Paternal Grandmother        Social History     Socioeconomic History    Marital status:    Tobacco Use    Smoking status: Former     Packs/day: 1.00     Years: 10.00     Pack years: 10.00     Types: Cigarettes     Quit date: 3/18/1986     Years since quittin.7    Smokeless tobacco: Former   Substance and Sexual Activity    Alcohol use: Not Currently    Drug use: Never    Sexual activity: Yes     Partners: Male     Birth control/protection: Post-menopausal       Chief Complaint:   Chief Complaint   Patient presents with    Follow-up     f/u for left TKA manipulation. complaints of pain but no swelling. tylenol everyday and soemtimes helps. currently using a cane.     Knee Pain     right knee injection zilretta. last injection was about 3-4 months ago and gave relief. complaints of pain but no swelling. taking tylenol everyday and sometimes helps.        History of present illness:  72-year-old female who presents status post left revision knee arthroplasty.  Patient is here for follow-up of right knee osteoarthritis.  Here today for Zilretta injection to the right knee.  With regards to left knee, she is improved from preoperative levels, however she continues to be stiff.  Postoperative course was complicated by periprosthetic fracture treated conservatively.      Review of Systems:    Constitution: Negative  for chills, fever, and sweats.  Negative for unexplained weight loss.    HENT:  Negative for headaches and blurry vision.    Cardiovascular:Negative for chest pain or irregular heart beat. Negative for hypertension.    Respiratory:  Negative for cough and shortness of breath.    Gastrointestinal: Negative for abdominal pain, heartburn, melena, nausea, and vomitting.    Genitourinary:  Negative bladder incontinence and dysuria.    Musculoskeletal:  See HPI    Neurological: Negative for numbness.    Psychiatric/Behavioral: Negative for depression.  The patient is not nervous/anxious.      Endocrine: Negative for polyuria    Hematologic/Lymphatic: Negative for bleeding problem.  Does not bruise/bleed easily.    Skin: Negative for poor would healing and rash      Physical Examination:    Vital Signs:    Vitals:    11/29/22 1048   BP: 125/70   Pulse: 71   Temp: 98.2 °F (36.8 °C)       Body mass index is 34.57 kg/m².    General: No acute distress, alert and oriented, healthy appearing    HEENT: Head is atraumatic, mucous membranes are moist    Neck: Supples, no JVD    Cardiovascular: Palpable dorsalis pedis and posterior tibial pulses, regular rate and rhythm to those pulses    Lungs: Breathing non-labored    Skin: no rashes appreciated    Neurologic: Can flex and extend knees, ankles, and toes. Sensation is grossly intact    Left knee:  Incisions well healed.  Patient gets extension of 5.  Flexion of about 80 stable throughout range of motion    Right knee:  Significant crepitus with range of motion of the right knee.  Patient can toe extension 0.  Flexion of 100.     X-rays:      Assessment::  Status post revision left total knee arthroplasty with postoperative arthrofibrosis  Right knee osteoarthritis    Plan:  Continue to monitor left knee.  No further surgical intervention planned at this time.  We will recheck her in about 3 months for this.  Plan to repeat an injection of Zilretta today and again in 3  months.    This note was created using [employer] voice recognition software that occasionally misinterpreted phrases or words.    Consult note is delivered via Epic messaging service.

## 2022-11-29 NOTE — PROCEDURES
Large Joint Aspiration/Injection: R knee    Date/Time: 11/29/2022 10:00 AM  Performed by: Sohan Bowens MD  Authorized by: Sohan Bowens MD     Consent Done?:  Yes (Verbal)  Indications:  Arthritis and joint swelling  Timeout: prior to procedure the correct patient, procedure, and site was verified    Prep: patient was prepped and draped in usual sterile fashion      Details:  Needle Size:  21 G  Approach:  Anterolateral  Location:  Knee  Site:  R knee  Medications:  32 mg triamcinolone acetonide 32 mg  Patient tolerance:  Patient tolerated the procedure well with no immediate complications

## 2022-12-07 ENCOUNTER — TELEPHONE (OUTPATIENT)
Dept: HEMATOLOGY/ONCOLOGY | Facility: CLINIC | Age: 72
End: 2022-12-07

## 2022-12-07 NOTE — TELEPHONE ENCOUNTER
Mrs Levi left a voice message stating that she was Dx w/ the flu x2 wks ago. Two to three days ago she started with her throat and glands swelling she called her pcp and was instructed to call here. Please advise.

## 2023-03-02 ENCOUNTER — OFFICE VISIT (OUTPATIENT)
Dept: ORTHOPEDICS | Facility: CLINIC | Age: 73
End: 2023-03-02
Payer: MEDICARE

## 2023-03-02 VITALS
HEIGHT: 60 IN | SYSTOLIC BLOOD PRESSURE: 144 MMHG | HEART RATE: 77 BPM | DIASTOLIC BLOOD PRESSURE: 66 MMHG | WEIGHT: 177 LBS | BODY MASS INDEX: 34.75 KG/M2

## 2023-03-02 DIAGNOSIS — Z47.1 AFTERCARE FOLLOWING LEFT KNEE JOINT REPLACEMENT SURGERY: Primary | ICD-10-CM

## 2023-03-02 DIAGNOSIS — Z96.652 AFTERCARE FOLLOWING LEFT KNEE JOINT REPLACEMENT SURGERY: Primary | ICD-10-CM

## 2023-03-02 DIAGNOSIS — M17.11 PRIMARY OSTEOARTHRITIS OF RIGHT KNEE: ICD-10-CM

## 2023-03-02 PROCEDURE — 99213 OFFICE O/P EST LOW 20 MIN: CPT | Mod: 25,,, | Performed by: NURSE PRACTITIONER

## 2023-03-02 PROCEDURE — 20610 LARGE JOINT ASPIRATION/INJECTION: R KNEE: ICD-10-PCS | Mod: RT,,, | Performed by: NURSE PRACTITIONER

## 2023-03-02 PROCEDURE — 20610 DRAIN/INJ JOINT/BURSA W/O US: CPT | Mod: RT,,, | Performed by: NURSE PRACTITIONER

## 2023-03-02 PROCEDURE — 99213 PR OFFICE/OUTPT VISIT, EST, LEVL III, 20-29 MIN: ICD-10-PCS | Mod: 25,,, | Performed by: NURSE PRACTITIONER

## 2023-03-02 NOTE — PROGRESS NOTES
Chief Complaint:   Chief Complaint   Patient presents with    Injections     Lt knee zilretta injection, last on was on 11/29/23.       History of present illness: Amita Trejo is a 72 y.o. female, presents to clinic today with bilateral knee pain.  She is several months status post revision of left total knee arthroplasty.  This does continue to give her some pain.  Continues to have significant stiffness.  Ambulating with a cane.  Regards to the right knee she did receive a Zilretta injection about 3 months ago.  Today for another injection in the right knee.  Her last injection gave her about 1 month of relief.  Unable to take anti-inflammatories and is currently taking Tylenol as needed.    Past Medical History:   Diagnosis Date    Acid reflux     Arthritis     CLL (chronic lymphocytic leukemia)     Hypertension        Past Surgical History:   Procedure Laterality Date    BREAST SURGERY Left 1991?    lumpectomy    CARPAL TUNNEL RELEASE      CYST REMOVAL      EPIDURAL STEROID INJECTION      JOINT REPLACEMENT  07/21    Left knee    KNEE JOINT MANIPULATION Left 8/29/2022    Procedure: MANIPULATION, KNEE;  Surgeon: Sohan Bowens MD;  Location: [facility] OR;  Service: Orthopedics;  Laterality: Left;    REVISION OF KNEE ARTHROPLASTY Left 06/08/2022    Procedure: REVISION, ARTHROPLASTY, KNEE;  Surgeon: Sohan Bowens MD;  Location: [facility] OR;  Service: Orthopedics;  Laterality: Left;  FEMUR / PATHOLOGY / ASHLEY    SHOULDER SURGERY      SINUS SURGERY      x 2    SPINE SURGERY  05/21    Bulging disc in lower back    THYROID SURGERY      TONSILLECTOMY         Current Outpatient Medications   Medication Sig    amLODIPine (NORVASC) 5 MG tablet Take 1 tablet (5 mg total) by mouth every evening.    B-complex with vitamin C (Z-BEC OR EQUIV) tablet Take 1 tablet by mouth once daily.    cholecalciferol, vitamin D3, 125 mcg (5,000 unit) capsule Take 5,000 Units by mouth once daily.    lisdexamfetamine (VYVANSE) 70 MG capsule Take  70 mg by mouth every morning.    metoprolol tartrate (LOPRESSOR) 50 MG tablet Take 75 mg by mouth once daily at 6am.    pantoprazole (PROTONIX) 40 MG tablet Take 40 mg by mouth once daily.    polysaccharide iron complex 200 mg iron Cap Take 200 mg by mouth once daily.    pramipexole (MIRAPEX) 1.5 MG tablet Take 1.5 mg by mouth 2 (two) times a day.    telmisartan-hydrochlorothiazide (MICARDIS HCT) 40-12.5 mg per tablet Take 1 tablet by mouth once daily.    vitamin B complex (B COMPLEX 1 ORAL)     aspirin (ECOTRIN) 81 MG EC tablet Take 1 tablet (81 mg total) by mouth every 12 (twelve) hours.    gabapentin (NEURONTIN) 300 MG capsule Take 1 capsule (300 mg total) by mouth 3 (three) times daily. for 14 days     No current facility-administered medications for this visit.       Review of patient's allergies indicates:   Allergen Reactions    Duloxetine      Other reaction(s): Bradycardia, Hypotension, Vomit    Lorazepam      Other reaction(s): Confusion  amnesia      Tramadol Other (See Comments)     nightmares       Family History   Problem Relation Age of Onset    Hypertension Mother     COPD Mother     Hearing loss Mother     Hypertension Father     COPD Father     Hearing loss Father     Breast cancer Sister     Cancer Sister     Diabetes Maternal Grandmother         Reina    Diabetes Paternal Grandmother        Social History     Socioeconomic History    Marital status:    Tobacco Use    Smoking status: Former     Packs/day: 1.00     Years: 10.00     Pack years: 10.00     Types: Cigarettes     Quit date: 3/18/1986     Years since quittin.9    Smokeless tobacco: Former   Substance and Sexual Activity    Alcohol use: Not Currently    Drug use: Never    Sexual activity: Yes     Partners: Male     Birth control/protection: Post-menopausal           Review of Systems:    Denies fevers, chills, chest pain, shortness of breath. Comprehensive review of systems performed and otherwise negative except as noted in  HPI     Physical Examination:    General: awake and alert, no acute distress, healthy appearing  Head and Neck: Head atraumatic/normocephalic. Moist MM  CV: brisk cap refill  Lungs: non-labored breathing, w/o cough or SOB  Skin: no rashes present, warm to touch  Neuro: sensation grossly intact distally       Vital Signs:    Vitals:    03/02/23 1118   BP: (!) 144/66   Pulse: 77       Body mass index is 34.57 kg/m².    Left knee:  Incisions well healed.  Patient gets extension of 5.  Flexion of about 80 stable throughout range of motion     Right knee:  Significant crepitus with range of motion of the right knee.  Patient can toe extension 0.  Flexion of 100.         Assessment::  Status post revision of left total knee and primary osteoarthritis right knee    Plan:  Patient presents to clinic today in regards to bilateral knee pain.  We will treat her right knee with another Zilretta injection.  We did speak about her different options going forward and she would like to try the IO vera procedure on bilateral knees.  We will get her approved for the right knee as well as try for the left knee.  Once approved we will call her with dates available to schedule this IO vera procedure here in the office.  Patient states understanding and agrees with plan of care.    The above findings, diagnostics, and treatment plan were discussed with Dr. Sohan Bowens who is in agreement with the plan of care.      This note was created using [employer] voice recognition software that occasionally misinterpreted phrases or words.    Consult note is delivered via Epic messaging service.

## 2023-03-02 NOTE — PROCEDURES
Large Joint Aspiration/Injection: R knee    Date/Time: 3/2/2023 10:30 AM  Performed by: BRENDAN Staton  Authorized by: Sohan Bowens MD     Consent Done?:  Yes (Verbal)  Indications:  Arthritis and pain  Timeout: prior to procedure the correct patient, procedure, and site was verified    Prep: patient was prepped and draped in usual sterile fashion    Local anesthesia used?: No      Details:  Needle Size:  22 G  Ultrasonic Guidance for needle placement?: No    Approach:  Anterolateral  Location:  Knee  Site:  R knee  Medications:  32 mg triamcinolone acetonide 32 mg  Patient tolerance:  Patient tolerated the procedure well with no immediate complications

## 2023-03-22 ENCOUNTER — TELEPHONE (OUTPATIENT)
Dept: ORTHOPEDICS | Facility: CLINIC | Age: 73
End: 2023-03-22
Payer: MEDICARE

## 2023-03-22 NOTE — TELEPHONE ENCOUNTER
Spoke to patient to inform her that we will be rescheduling her Iovera from this Friday 3/24/23 due to supply shipping issues.  Informed her that as soon as I get our next date from Dr. Bowens, I will call her to reschedule.

## 2023-03-27 ENCOUNTER — TELEPHONE (OUTPATIENT)
Dept: HEMATOLOGY/ONCOLOGY | Facility: CLINIC | Age: 73
End: 2023-03-27
Payer: MEDICARE

## 2023-03-27 NOTE — TELEPHONE ENCOUNTER
Pt reports labwork done at PCP and WBC elevated. PCP recommended to contact clinic for possible earlier appt. Next appt 5/2. Pt denies fever, cough, congestion, or any signs of infection. Please advise.--SC, LPN

## 2023-05-02 ENCOUNTER — OFFICE VISIT (OUTPATIENT)
Dept: HEMATOLOGY/ONCOLOGY | Facility: CLINIC | Age: 73
End: 2023-05-02
Payer: MEDICARE

## 2023-05-02 VITALS
BODY MASS INDEX: 33.4 KG/M2 | HEIGHT: 60 IN | SYSTOLIC BLOOD PRESSURE: 119 MMHG | TEMPERATURE: 98 F | RESPIRATION RATE: 18 BRPM | WEIGHT: 170.13 LBS | DIASTOLIC BLOOD PRESSURE: 74 MMHG | OXYGEN SATURATION: 98 % | HEART RATE: 67 BPM

## 2023-05-02 DIAGNOSIS — C83.00 LYMPHOMA, SMALL LYMPHOCYTIC: Primary | ICD-10-CM

## 2023-05-02 PROCEDURE — 99214 OFFICE O/P EST MOD 30 MIN: CPT | Mod: ,,, | Performed by: STUDENT IN AN ORGANIZED HEALTH CARE EDUCATION/TRAINING PROGRAM

## 2023-05-02 PROCEDURE — 99214 PR OFFICE/OUTPT VISIT, EST, LEVL IV, 30-39 MIN: ICD-10-PCS | Mod: ,,, | Performed by: STUDENT IN AN ORGANIZED HEALTH CARE EDUCATION/TRAINING PROGRAM

## 2023-05-02 RX ORDER — B-COMPLEX WITH VITAMIN C
TABLET ORAL
COMMUNITY
End: 2023-05-02 | Stop reason: SDUPTHER

## 2023-05-02 RX ORDER — PANTOPRAZOLE SODIUM 40 MG/1
40 TABLET, DELAYED RELEASE ORAL DAILY
COMMUNITY

## 2023-05-02 NOTE — PROGRESS NOTES
Subjective:       Patient ID: Amita Trejo is a 72 y.o. female.    Chief Complaint: Follow-up      History of Present Illness  Chief complaint: SLL Lugano II    HPI: 72 y/o F w/ PMHx of ELI on CPAP, mitral valve prolapse, HTN, ADHD, neuropathy, DJD referred to [facility] for newly diagnosed stage II SLL    She had routine screening MMG 2/2021 that showed some mildly enlarged b/l axillary nodes. She had b/l axillary US with Dr Lugo 3/2021 most suggestive of inflammation. Repeat US of right axilla obtained 6/2021 that showed some borderline enlarged nodes with benign sonographic appearance. She had right axillary LN biopsy showing SLL/CLL    Colonoscopy Dr Kwan 2016, negative per pt      Interval history    Today, 05/02/2023, patient denies any acute concerns today.  She denies any fevers, chills, drenching night sweats, decreased appetite or weight loss.  Patient was diagnosed with flu back in December and recovered without hospitalization.  She denies any further episodes of infections.  She does report some intentional weight loss.    PMHx: ELI on CPAP, mitral valve prolapse, HTN, ADHD, neuropathy, DJD  PSHx: tonsillectomy, sinus surgery x2, left breast lumpectomy (benign), torn meniscus right x2, thyroglossal duct cyst, right shoulder rotator cuff, back surgery  Social Hx: former smoker quit in 1986, 10 pack year, no ETOH, no drugs  Family Hx: sister: DCIS and melanoma, paternal grandfather: head and neck cancer (not a smoker)  Meds: reviewed  Allergies: ativan, cymbalta    Labs:  04/25/2023: CMP unremarkable, WBC count 14.9, hemoglobin 12.1, MCV 94.7, ANC 3.86.  03/08/2023:  WBC count 24.2, hemoglobin 13.3, MCV 94, platelet count 259.  7/6/22 Cr 0.88, Alb 3.8, TP 7.1, , Uric acid 5.5, WBC 10.61, Hgb 10.5, , ANC 2.90, ALC 6.90  3/3/22 WBC 13.5 RBC 3.74 Hg 11.2 MCV 94 rdw 13.8  ANC 3.4 ALC 8.9 Cr 1.12 Alb 4.4 Ca 9.4   12/8/21 Cr 0.86, Alb 3.8, TP 7.0, Ca 9.7, AlkPhos 103, , Uric  acid 5.9, WBC 13.21, Hgb 11.4, , Retic 1.43, ANC 4.41, ALC 7.73  9/21/21 Cr 1.16 Alb 3.9 TP 7.4 Ca 9.9 AlkPhos 92 AST 21 ALT 12  ferritin 122 WBC 9.82 Hg 11.1  ANC 3.35 ALC 4.91  7/21/21  Ferritin 138 Hep B core ab neg Hep B s ag neg Hep B s ab neg Hep C neg Uric acid 6.7 WBC 8.78 Hg 12.2  ANC 2.78 ALC 5.14 Direct Joao neg B2 micro 4.2 SPEP w/ ANGIE IgG 930 IgA 467 IgM 43 Abs kappa 43 Abs lambda 27.8 SFLC ratio 1.55 no M spike  1/18/20 Cr 0.89 Alb 3.9 TP 7.1 WBC 8.46 Hg 12.9 MCV 90.9  ANC 3.18 ALC 4.25    Imaging:  3/29/22 MMG prominent nodes in each axilla. Some nodes have minimally increased in size.     6/14/21 US axilla unilateral: 6 right axillary nodes, 2 are borderline enlarged measuring 2.4x1.7x1.3cm and 2.3x1.3x1.1cm. Benign sonographic appearance    2/18/21 CT N/C/A/P w/ contrast: no cervical adenopathy. Small thyroid nodules. No acute pulm disease. Small focus of nodular pleural thickening in fissure of left lung stable since 2019. Increased size of several borderline enlarged axillary LNs b/l in comparison to 2019 although this finding is nonspecific. Simple renal cysts on both kidneys increased in size compared to 2018 but not suspicious for malignancy    2/5/21 screening MMG: [id number]. Small LN in upper and outer quadrant of left breast increased slightly in size from prior exams. Multiple LN within each axilla larger and more dense than on prior exam. Lymphoma should be considered    Path:  6/16/21 right axillary LN biopsy: atypical lymphoproliferative process consistent with involvement by CLL/SLL.  Flow: abnormal B cells 82% of lymphs, CD19+, CD20+ dim, CD5+, CD23+ variable, CD11c partial+, CD38-, lambda +dim  FISH: del13q/-13 detected. del17p, t(11:14), trisomy 12, del11q and del6q negative.        Review of Systems  CONSTITUTIONAL: no fevers, no chills,no weight loss, no fatigue, no weakness  HEMATOLOGIC: no abnormal bleeding, no abnormal bruising, no  drenching night sweats  ONCOLOGIC: no new masses or lumps  HEENT: no vision loss, no tinnitus or hearing loss, no nose bleeding, no dysphagia, no odynophagia  CVS: no chest pain, no palpitations, no dyspnea on exertion  RESP: no shortness of breath, no hemoptysis, no cough  BREAST: no nipple discharge, no breast tenderness, no breast masses on self breast examination  GI: no nausea, no vomiting, no diarrhea, no constipation, no melena, no hematochezia, no hematemesis, no abdominal pain, no increase in abdominal girth  : no dysuria, no hematuria, no discharge  GYN: no abnormal vaginal bleeding, no dyspareunia, no vaginal discharge  INTEGUMENT: no rashes, no abnormal bruising, no nail pitting, no hyperpigmentation  NEURO: no falls, no memory loss, no paresthesias or dysesthesias, no urofecal incontinence or retention, no loss of strength on any extremity  MSK: +chronic lower back pain, no new joint pain but chronic b/l knee pain, no joint swelling  PSYCH: no suicidal or homicidal ideation, no depression, no insomnia, no anhedonia  ENDOCRINE: no heat or cold intolerance, no polyuria, no polydipsia        Objective:      Physical Exam  Vitals:    05/02/23 1054   BP: 119/74   Pulse: 67   Resp: 18   Temp: 97.7 °F (36.5 °C)        ECOG PS 1  GA: AAOx3, NAD  HEENT: NCAT, PERRLA, EOMI, good dentition, no oral ulcers. Tonsils surgically removed  LYMPH: no cervical, inguinal or supraclavicular adenopathy. Small b/l axillary LNs, largest about 2cm in right axilla, others around 1cm  CVS: s1s2 RRR, no M/R/G  RESP: CTA b/l, no crackles, no wheezes or rhonchi  ABD: soft, NT, ND, BS+, no hepatosplenomegaly  EXT: no deformities, b/l pitting pedal edema  SKIN: no rashes, no bruises or purpura, warm and dry  NEURO: normal mentation, strength 5/5 on all 4 extremities, no sensory deficits    Assessment:       Problem List Items Addressed This Visit          Oncology    Lymphoma, small lymphocytic - Primary           Plan:     1.  Lymphoma, small lymphocytic C83.00   Findings are most consistent with SLL Lugano stage II vs CLL Juarez I. She does likely have peripheral blood involvement as well  In any case, no anemia, no thrombocytopenia, no B symptoms, only 2 narciso stations involved (b/l axilla) and 13q del which confers good prognosis. No IGHV available but I do not think this is necessary at this time  Had systemic imaging with CT N/C/A/P w/ IV contrast 2/2021 without any bulky disease  No further workup needed at this time. Will observe every 12-16 weeks. If any indication arises, then she will also need PET CT and bone marrow biopsy      Plan   Reviewed labs, noted stable WBC count.  CBC, CMP, LDH, uric acid in 4 months   Follow-up in 4 months with above workup to discuss further recommendations.      A total of  30 minutes were spent in review of records, interpretation of test, coordination of care, discussion and counseling with the patient.        Portions of the record may have been created with voice recognition software. Occasional wrong-word or sound-a-like substitutions may have occurred due to the inherent limitations of voice recognition software. Read the chart carefully and recognize, using context, where substitutions have occurred.

## 2023-05-05 ENCOUNTER — HOSPITAL ENCOUNTER (OUTPATIENT)
Dept: RADIOLOGY | Facility: CLINIC | Age: 73
Discharge: HOME | End: 2023-05-05
Attending: ORTHOPAEDIC SURGERY
Payer: MEDICARE

## 2023-05-05 ENCOUNTER — PROCEDURE VISIT (OUTPATIENT)
Dept: ORTHOPEDICS | Facility: CLINIC | Age: 73
End: 2023-05-05
Payer: MEDICARE

## 2023-05-05 VITALS
HEART RATE: 70 BPM | BODY MASS INDEX: 33.38 KG/M2 | SYSTOLIC BLOOD PRESSURE: 116 MMHG | HEIGHT: 60 IN | DIASTOLIC BLOOD PRESSURE: 66 MMHG | TEMPERATURE: 97 F | WEIGHT: 170 LBS

## 2023-05-05 DIAGNOSIS — M16.12 PRIMARY OSTEOARTHRITIS OF LEFT HIP: ICD-10-CM

## 2023-05-05 DIAGNOSIS — Z47.1 AFTERCARE FOLLOWING LEFT KNEE JOINT REPLACEMENT SURGERY: ICD-10-CM

## 2023-05-05 DIAGNOSIS — M70.62 GREATER TROCHANTERIC BURSITIS OF LEFT HIP: ICD-10-CM

## 2023-05-05 DIAGNOSIS — M17.11 PRIMARY OSTEOARTHRITIS OF RIGHT KNEE: ICD-10-CM

## 2023-05-05 DIAGNOSIS — Z96.652 AFTERCARE FOLLOWING LEFT KNEE JOINT REPLACEMENT SURGERY: ICD-10-CM

## 2023-05-05 DIAGNOSIS — M25.552 LEFT HIP PAIN: ICD-10-CM

## 2023-05-05 DIAGNOSIS — M25.552 LEFT HIP PAIN: Primary | ICD-10-CM

## 2023-05-05 PROCEDURE — 99213 OFFICE O/P EST LOW 20 MIN: CPT | Mod: 25,,, | Performed by: ORTHOPAEDIC SURGERY

## 2023-05-05 PROCEDURE — 20610 DRAIN/INJ JOINT/BURSA W/O US: CPT | Mod: LT,,, | Performed by: NURSE PRACTITIONER

## 2023-05-05 PROCEDURE — 64640 INJECTION TREATMENT OF NERVE: CPT | Mod: 50,,, | Performed by: ORTHOPAEDIC SURGERY

## 2023-05-05 PROCEDURE — 20610 PR DRAIN/INJECT LARGE JOINT/BURSA: ICD-10-PCS | Mod: 51,XS,LT, | Performed by: ORTHOPAEDIC SURGERY

## 2023-05-05 PROCEDURE — 73502 X-RAY EXAM HIP UNI 2-3 VIEWS: CPT | Mod: LT,,, | Performed by: ORTHOPAEDIC SURGERY

## 2023-05-05 PROCEDURE — 73502 XR HIP WITH PELVIS WHEN PERFORMED, 2 OR 3 VIEWS LEFT: ICD-10-PCS | Mod: LT,,, | Performed by: ORTHOPAEDIC SURGERY

## 2023-05-05 PROCEDURE — 64640 PR DESTRUCT BY NEURO AGENT; OTHER PERIPH NERVE: ICD-10-PCS | Mod: 50,,, | Performed by: ORTHOPAEDIC SURGERY

## 2023-05-05 PROCEDURE — 99213 PR OFFICE/OUTPT VISIT, EST, LEVL III, 20-29 MIN: ICD-10-PCS | Mod: 25,,, | Performed by: ORTHOPAEDIC SURGERY

## 2023-05-05 PROCEDURE — 20610 LARGE JOINT ASPIRATION/INJECTION: L GREATER TROCHANTERIC BURSA: ICD-10-PCS | Mod: LT,,, | Performed by: NURSE PRACTITIONER

## 2023-05-05 PROCEDURE — 20610 DRAIN/INJ JOINT/BURSA W/O US: CPT | Mod: 51,XS,LT, | Performed by: ORTHOPAEDIC SURGERY

## 2023-05-05 RX ORDER — LIDOCAINE HYDROCHLORIDE 20 MG/ML
5 INJECTION, SOLUTION EPIDURAL; INFILTRATION; INTRACAUDAL; PERINEURAL
Status: DISCONTINUED | OUTPATIENT
Start: 2023-05-05 | End: 2023-05-05 | Stop reason: HOSPADM

## 2023-05-05 RX ORDER — BETAMETHASONE SODIUM PHOSPHATE AND BETAMETHASONE ACETATE 3; 3 MG/ML; MG/ML
12 INJECTION, SUSPENSION INTRA-ARTICULAR; INTRALESIONAL; INTRAMUSCULAR; SOFT TISSUE
Status: DISCONTINUED | OUTPATIENT
Start: 2023-05-05 | End: 2023-05-05 | Stop reason: HOSPADM

## 2023-05-05 RX ADMIN — BETAMETHASONE SODIUM PHOSPHATE AND BETAMETHASONE ACETATE 12 MG: 3; 3 INJECTION, SUSPENSION INTRA-ARTICULAR; INTRALESIONAL; INTRAMUSCULAR; SOFT TISSUE at 08:05

## 2023-05-05 RX ADMIN — LIDOCAINE HYDROCHLORIDE 5 ML: 20 INJECTION, SOLUTION EPIDURAL; INFILTRATION; INTRACAUDAL; PERINEURAL at 08:05

## 2023-05-05 NOTE — PROGRESS NOTES
Chief Complaint:   Chief Complaint   Patient presents with    Right Knee - Procedure     Patient is here for batool iovera procedure. Denies improvement in pain. C/o of right hip pain. Ambulates with a cane.        History of present illness:  72-year-old female presents today for evaluation and here today for IO vera.  Patient continues to complain of left as well as right knee pain.  She is status post revision of her left knee.  Continues to have stiffness.  Also with ongoing complaints of left hip pain she is requesting an injection today    Past Medical History:   Diagnosis Date    Acid reflux     Arthritis     CLL (chronic lymphocytic leukemia)     Hypertension        Past Surgical History:   Procedure Laterality Date    BREAST SURGERY Left 1991?    lumpectomy    CARPAL TUNNEL RELEASE      CYST REMOVAL      EPIDURAL STEROID INJECTION      JOINT REPLACEMENT  07/21    Left knee    KNEE JOINT MANIPULATION Left 8/29/2022    Procedure: MANIPULATION, KNEE;  Surgeon: Sohan Bowens MD;  Location: [facility] OR;  Service: Orthopedics;  Laterality: Left;    REVISION OF KNEE ARTHROPLASTY Left 06/08/2022    Procedure: REVISION, ARTHROPLASTY, KNEE;  Surgeon: Sohan Bowens MD;  Location: [facility] OR;  Service: Orthopedics;  Laterality: Left;  FEMUR / PATHOLOGY / ASHLEY    SHOULDER SURGERY      SINUS SURGERY      x 2    SPINE SURGERY  05/21    Bulging disc in lower back    THYROID SURGERY      TONSILLECTOMY         Current Outpatient Medications   Medication Sig    amLODIPine (NORVASC) 5 MG tablet Take 1 tablet (5 mg total) by mouth every evening.    B-complex with vitamin C (Z-BEC OR EQUIV) tablet Take 1 tablet by mouth once daily.    cholecalciferol, vitamin D3, 125 mcg (5,000 unit) capsule Take 5,000 Units by mouth once daily.    lisdexamfetamine (VYVANSE) 70 MG capsule Take 70 mg by mouth every morning.    metoprolol tartrate (LOPRESSOR) 50 MG tablet Take 75 mg by mouth once daily at 6am.    pantoprazole (PROTONIX) 40 MG  tablet     polysaccharide iron complex 200 mg iron Cap Take by mouth 2 (two) times daily.    pramipexole (MIRAPEX) 1.5 MG tablet Take 1.5 mg by mouth 2 (two) times a day.    telmisartan-hydrochlorothiazide (MICARDIS HCT) 40-12.5 mg per tablet Take 1 tablet by mouth once daily.    vitamin B complex (B COMPLEX 1 ORAL)      No current facility-administered medications for this visit.       Review of patient's allergies indicates:   Allergen Reactions    Duloxetine      Other reaction(s): Bradycardia, Hypotension, Vomit    Lorazepam      Other reaction(s): Confusion  amnesia      Tramadol Other (See Comments)     nightmares       Family History   Problem Relation Age of Onset    Hypertension Mother     COPD Mother     Hearing loss Mother     Hypertension Father     COPD Father     Hearing loss Father     Breast cancer Sister     Cancer Sister     Diabetes Maternal Grandmother         Reina    Diabetes Paternal Grandmother        Social History     Socioeconomic History    Marital status:    Tobacco Use    Smoking status: Former     Packs/day: 1.00     Years: 10.00     Pack years: 10.00     Types: Cigarettes     Quit date: 3/18/1986     Years since quittin.1    Smokeless tobacco: Former   Substance and Sexual Activity    Alcohol use: Not Currently    Drug use: Never    Sexual activity: Yes     Partners: Male     Birth control/protection: Post-menopausal           Review of Systems:    Constitution: Negative for chills, fever, and sweats.  Negative for unexplained weight loss.    HENT:  Negative for headaches and blurry vision.    Cardiovascular:Negative for chest pain or irregular heart beat. Negative for hypertension.    Respiratory:  Negative for cough and shortness of breath.    Gastrointestinal: Negative for abdominal pain, heartburn, melena, nausea, and vomitting.    Genitourinary:  Negative bladder incontinence and dysuria.    Musculoskeletal:  See HPI    Neurological: Negative for  numbness.    Psychiatric/Behavioral: Negative for depression.  The patient is not nervous/anxious.      Endocrine: Negative for polyuria    Hematologic/Lymphatic: Negative for bleeding problem.  Does not bruise/bleed easily.    Skin: Negative for poor would healing and rash      Physical Examination:    Vital Signs:    Vitals:    05/05/23 0853   BP: 116/66   Pulse: 70   Temp: 97.4 °F (36.3 °C)       Body mass index is 33.2 kg/m².    General: No acute distress, alert and oriented, healthy appearing    HEENT: Head is atraumatic, mucous membranes are moist    Neck: Supples, no JVD    Cardiovascular: Palpable dorsalis pedis and posterior tibial pulses, regular rate and rhythm to those pulses    Lungs: Breathing non-labored    Skin: no rashes appreciated    Neurologic: Can flex and extend knees, ankles, and toes. Sensation is grossly intact    Left knee:  Patient has persistent stiffness.  She is a as range of motion from about 10-60.  Right knee has full range of motion with significant crepitus  .  Left hip:  Tenderness laterally over greater trochanter.  Patient has very limited range of motion of the left hip positive [place]    X-rays:  Three views left hip reviewed.  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  With regards to her left hip will give her an injection to calm down her bursitis.  Patient has end-stage arthritis and will likely end up with an arthroplasty in the future.  We will see her back in 6 weeks to see how this injection works.    This note was created using [employer] voice recognition software that occasionally misinterpreted phrases or words.    Consult note is delivered via Epic messaging service.

## 2023-05-05 NOTE — PROCEDURES
Large Joint Aspiration/Injection: L greater trochanteric bursa    Date/Time: 5/5/2023 8:30 AM  Performed by: BRENDAN Staton  Authorized by: Sohan Bowens MD     Consent Done?:  Yes (Verbal)  Indications:  Pain  Timeout: prior to procedure the correct patient, procedure, and site was verified    Prep: patient was prepped and draped in usual sterile fashion      Details:  Needle Size:  22 G  Approach:  Lateral  Location:  Hip  Site:  L greater trochanteric bursa  Medications:  5 mL LIDOcaine (PF) 20 mg/mL (2%) 20 mg/mL (2 %); 12 mg betamethasone acetate-betamethasone sodium phosphate 6 mg/mL

## 2023-05-05 NOTE — PROCEDURES
Procedure Note      Patient: Aimta Trejo   : 1950    MRN: [id number]  Date: 2023      Surgeon:Sohan Bowens MD  Assistant: Sivan Dorsey NP  Assistant was essential in patient positioning, administering local anesthetic, and cryoablation of nerves  Preoperative Diagnosis:  Bilateral primary osteoarthrtiis  Postoperative Diagnosis: Same  Procedure:  Cryoablation of B anterior femoral cutaneous nerve, lateral femoral cutaneous nerve, and inferior saphenous nerve  EBL: minimal  Complications: None    Indications: Amita Trejo is a 72 y.o. female presenting with knee pain and osteoarthritis.  After thorough discussion of the risks, benefits, expected outcomes, and alternatives to intervention, the patient agreed to proceed with Iovera.  Specific risks discussed included, but were not limited to: superficial or deep infection, wound healing complications, DVT/PE, significant bleeding requiring transfusion, damage to named anatomic structures in the immediate area including named neurovascular structures, infection, nonunion, malunion and general risks of anesthesia.  The patient voiced understanding and written as well as consent was obtained prior to the procedure.    Procedure Note:      Patient placed in supine position.  Leg was laid straight and flat.  Anterior femoral cutaneous nerve, lateral femoral cutaneous nerve, and the inferior saphenous nerve were identified using anatomical landmarks.  Center of the patella was marked and one third of the distance was measured from the patella to the inguinal crease.  Line was drawn the with the patella from this measurement marking the anterior femoral cutaneous nerve and lateral femoral cutaneous nerve.  We then marked the lower pole of the patella and measured 5 cm medial to this.  5 cm medial was then measured and marked in the tibial tubercle.  These lines were connected marking the inferior saphenous nerve.      At this time a time-out was performed,  with the correct patient, site, and procedure identified.  The universal time out protocols were followed.      Patient's skin was cleaned and sterilized over both treatment areas.  It was then anesthetized using skin wheal of 1% lidocaine totaling 15 cc.    After appropriate anesthetic was administered, the smart tip cryoneurolysis needle was inserted into the proximal treatment area for the anterior femoral cutaneous nerve and lateral femoral cutaneous nerve.  The treatment duration was 60 seconds per cycle.  At the termination of each treatment cycle, the needle was removed.  This was repeated across the entire premarked treatment area.  At the termination treatment cryolysis needle was removed.  The patient skin was cleansed and covered with a bandage.  Patient tolerated the procedure very well and had decrease in pain on his anterior knee.    We then moved on to the distal procedure in a separate position for the inferior saphenous nerve.  After appropriate anesthetic was administered, the smart tip cryoneurolysis needle was inserted into the distal treatment area for the inferior saphenous nerve.  The treatment duration was 60 seconds per cycle.  At the termination of each treatment cycle, the needle was removed.  This was repeated across the entire premarked treatment area.  At the termination treatment cryolysis needle was removed.  The patient skin was cleansed and covered with a bandage.      WE then moved on to the contralateral side.  Patient's skin was cleaned and sterilized over both treatment areas.  It was then anesthetized using skin wheal of 1% lidocaine totaling 15 cc.    After appropriate anesthetic was administered, the smart tip cryoneurolysis needle was inserted into the proximal treatment area for the anterior femoral cutaneous nerve and lateral femoral cutaneous nerve.  The treatment duration was 60 seconds per cycle.  At the termination of each treatment cycle, the needle was removed.  This  was repeated across the entire premarked treatment area.  At the termination treatment cryolysis needle was removed.  The patient skin was cleansed and covered with a bandage.  Patient tolerated the procedure very well and had decrease in pain on his anterior knee.    We then moved on to the distal procedure in a separate position for the inferior saphenous nerve.  After appropriate anesthetic was administered, the smart tip cryoneurolysis needle was inserted into the distal treatment area for the inferior saphenous nerve.  The treatment duration was 60 seconds per cycle.  At the termination of each treatment cycle, the needle was removed.  This was repeated across the entire premarked treatment area.  At the termination treatment cryolysis needle was removed.  The patient skin was cleansed and covered with a bandage.      Patient tolerated the procedure very well and had decrease in pain to the knee on discharge.  Patient was able to ambulate from the office and tolerated this very well..      This note/OR report was created with the assistance of  voice recognition software or phone  dictation.  There may be transcription errors as a result of using this technology however minimal. Effort has been made to assure accuracy of transcription but any obvious errors or omissions should be clarified with the author of the document.

## 2023-06-26 ENCOUNTER — OFFICE VISIT (OUTPATIENT)
Dept: ORTHOPEDICS | Facility: CLINIC | Age: 73
End: 2023-06-26
Payer: MEDICARE

## 2023-06-26 ENCOUNTER — HOSPITAL ENCOUNTER (OUTPATIENT)
Dept: RADIOLOGY | Facility: CLINIC | Age: 73
Discharge: HOME | End: 2023-06-26
Attending: SPECIALIST
Payer: MEDICARE

## 2023-06-26 VITALS
BODY MASS INDEX: 33.02 KG/M2 | TEMPERATURE: 97 F | WEIGHT: 168.19 LBS | HEART RATE: 79 BPM | HEIGHT: 60 IN | DIASTOLIC BLOOD PRESSURE: 7 MMHG | SYSTOLIC BLOOD PRESSURE: 132 MMHG

## 2023-06-26 DIAGNOSIS — M25.562 PAIN IN BOTH KNEES, UNSPECIFIED CHRONICITY: ICD-10-CM

## 2023-06-26 DIAGNOSIS — T84.82XS ARTHROFIBROSIS OF TOTAL KNEE ARTHROPLASTY, SEQUELA: ICD-10-CM

## 2023-06-26 DIAGNOSIS — M25.561 PAIN IN BOTH KNEES, UNSPECIFIED CHRONICITY: ICD-10-CM

## 2023-06-26 DIAGNOSIS — M17.11 PRIMARY OSTEOARTHRITIS OF RIGHT KNEE: Primary | ICD-10-CM

## 2023-06-26 DIAGNOSIS — M16.12 PRIMARY OSTEOARTHRITIS OF LEFT HIP: ICD-10-CM

## 2023-06-26 PROCEDURE — 73564 XR KNEE COMP 4 OR MORE VIEWS BILAT: ICD-10-PCS | Mod: 50,,, | Performed by: SPECIALIST

## 2023-06-26 PROCEDURE — 99214 OFFICE O/P EST MOD 30 MIN: CPT | Mod: ,,, | Performed by: SPECIALIST

## 2023-06-26 PROCEDURE — 73564 X-RAY EXAM KNEE 4 OR MORE: CPT | Mod: 50,,, | Performed by: SPECIALIST

## 2023-06-26 PROCEDURE — 99214 PR OFFICE/OUTPT VISIT, EST, LEVL IV, 30-39 MIN: ICD-10-PCS | Mod: ,,, | Performed by: SPECIALIST

## 2023-06-26 NOTE — PROGRESS NOTES
Past Medical History:   Diagnosis Date    Acid reflux     Arthritis     CLL (chronic lymphocytic leukemia)     Hypertension        Past Surgical History:   Procedure Laterality Date    BREAST SURGERY Left 1991?    lumpectomy    CARPAL TUNNEL RELEASE      CYST REMOVAL      EPIDURAL STEROID INJECTION      JOINT REPLACEMENT  07/21    Left knee    KNEE JOINT MANIPULATION Left 8/29/2022    Procedure: MANIPULATION, KNEE;  Surgeon: Sohan Bowens MD;  Location: [facility];  Service: Orthopedics;  Laterality: Left;    REVISION OF KNEE ARTHROPLASTY Left 06/08/2022    Procedure: REVISION, ARTHROPLASTY, KNEE;  Surgeon: Sohan Bowens MD;  Location: [facility] OR;  Service: Orthopedics;  Laterality: Left;  FEMUR / PATHOLOGY / ASHLEY    SHOULDER SURGERY      SINUS SURGERY      x 2    SPINE SURGERY  05/21    Bulging disc in lower back    THYROID SURGERY      TONSILLECTOMY         Current Outpatient Medications   Medication Sig    amLODIPine (NORVASC) 5 MG tablet Take 1 tablet (5 mg total) by mouth every evening.    B-complex with vitamin C (Z-BEC OR EQUIV) tablet Take 1 tablet by mouth once daily.    cholecalciferol, vitamin D3, 125 mcg (5,000 unit) capsule Take 5,000 Units by mouth once daily.    lisdexamfetamine (VYVANSE) 70 MG capsule Take 70 mg by mouth every morning.    metoprolol tartrate (LOPRESSOR) 50 MG tablet Take 75 mg by mouth once daily at 6am.    pantoprazole (PROTONIX) 40 MG tablet     polysaccharide iron complex 200 mg iron Cap Take by mouth 2 (two) times daily.    pramipexole (MIRAPEX) 1.5 MG tablet Take 1.5 mg by mouth 2 (two) times a day.    telmisartan-hydrochlorothiazide (MICARDIS HCT) 40-12.5 mg per tablet Take 1 tablet by mouth once daily.    vitamin B complex (B COMPLEX 1 ORAL)      No current facility-administered medications for this visit.       Review of patient's allergies indicates:   Allergen Reactions    Duloxetine      Other reaction(s): Bradycardia, Hypotension, Vomit    Lorazepam      Other  reaction(s): Confusion  amnesia      Tramadol Other (See Comments)     nightmares       Family History   Problem Relation Age of Onset    Hypertension Mother     COPD Mother     Hearing loss Mother     Hypertension Father     COPD Father     Hearing loss Father     Breast cancer Sister     Cancer Sister     Diabetes Maternal Grandmother         Reina    Diabetes Paternal Grandmother        Social History     Socioeconomic History    Marital status:    Tobacco Use    Smoking status: Former     Packs/day: 1.00     Years: 10.00     Pack years: 10.00     Types: Cigarettes     Quit date: 3/18/1986     Years since quittin.2    Smokeless tobacco: Former   Substance and Sexual Activity    Alcohol use: Not Currently    Drug use: Never    Sexual activity: Yes     Partners: Male     Birth control/protection: Post-menopausal       Chief Complaint:   Chief Complaint   Patient presents with    Left Knee - Pain     Having left knee pain after Revision Left TKA by Dr. Bowens on 22    Right Knee - Pain     Pain in the right knee has gotten worse in the last 3-4 weeks.  She feels like a bakers cyst with fluid.    Left Hip - Pain     Pain started about 2-3 months ago,  Did not do anything that made it start hurting.       Consulting Physician: No ref. provider found    History of present illness:    This is a 72 y.o. year old female who complains of right knee pain, left knee stiffness, and left hip pain.  She underwent a left total knee arthroplasty a couple of years ago by Dr. Marium Diaz with a subsequent revision left total knee arthroplasty due to severe stiffness by Dr. Bowens 1 year ago.  She has occasional pain in the left knee but says that her main problem is the stiffness in the left knee.  She has pain in the left groin with weight-bearing and the right knee with an associated bowleg deformity of the right knee.    Review of Systems:    Constitution:   Denies chills, fever, and sweats.  HENT:   Denies  headaches or blurry vision.  Cardiovascular:  Denies chest pain or irregular heart beat.  Respiratory:   Denies cough or shortness of breath.  Gastrointestinal:  Denies abdominal pain, nausea, or vomiting.  Musculoskeletal:   Denies muscle cramps.  Neurological:   Denies dizziness or focal weakness.  Psychiatric/Behavior: Normal mental status.  Hematology/Lymph:  Denies bleeding problem or easy bruising/bleeding.  Skin:    Denies rash or suspicious lesions.    Examination:    Vital Signs:    Vitals:    23 1011 23 1018   BP: (!) 132/7    Pulse: 79    Temp: 97.1 °F (36.2 °C)    Weight: 76.3 kg (168 lb 3.2 oz)    Height: 5' (1.524 m)    PainSc:    9       Body mass index is 32.85 kg/m².    Constitution:   Well-developed, well nourished patient in no acute distress.  Neurological:   Alert and oriented x 3 and cooperative to examination.     Psychiatric/Behavior: Normal mental status.  Respiratory:   No shortness of breath.non labored breathing.  Cardiovascular: Regular rate and rhythm  Eyes:    Extraoccular muscles intact  Skin:    No scars, rash or suspicious lesions.    Physical Exam:  Right knee exam:       General Musculoskeletal Exam   Gait: antalgic       Right Knee Exam     Inspection   Erythema: absent  Effusion: present  Deformity: present  Bruising: absent    Tenderness   The patient is tender to palpation of the medial joint line, MCL, condyle and medial retinaculum.    Crepitus   The patient has crepitus of the medial joint line.    Range of Motion   Extension: abnormal   Flexion: abnormal   5° to 120°    Tests   Meniscus   Kerwin:  Medial - positive Lateral - negative  Ligament Examination   MCL - Valgus: normal (0 to 2mm)  LCL - Varus: normal  Patella   Passive Patellar Tilt: neutral    Other   Sensation: normal    Comments:  Varus deformity    Muscle Strength   Right Lower Extremity   Quadriceps:  5/5   Hamstrin/5     Vascular Exam     Right Pulses  Dorsalis Pedis:      2+  Posterior  Tibial:      2+      Left knee exam:   No swelling, no redness, no increased heat   Well-healed anterior surgical scar   No palpable tenderness   Range of motion 10° to 75° a proximally  Antalgic gait   2+ pulses with intact sensory and motor function  Quad atrophy     Left hip exam:   0° internal rotation  20° external rotation  90° flexion   Pain with motion   Weakness of the gluteus musculature on strength testing and Trendelenburg testing   2+ pulses with intact sensory and motor function  Antalgic gait    Imaging: X-rays ordered and images interpreted today personally by me of four views of the right and left knees which show pristine interfaces and stable well-aligned revision left total knee arthroplasty with no evidence of fracture or loosening.  Patient is bone-on-bone in the medial compartment of the right knee with varus deformity and sclerosis in the medial compartment of the right knee.  Tricompartmental osteophytes.  Grade 4 changes in the medial compartment of the right knee.  Impression:  Advanced osteoarthrosis right knee and stable well-aligned revision left total knee arthroplasty    X-rays reviewed of the left hip which show pedicle screws and rods in the lumbar spine.  Patient is bone-on-bone in the left hip with periacetabular and femoral head osteophytes.  Degenerative cystic formation.  Grade 4 changes with sclerosis in the superior weight-bearing aspect of the acetabulum and femoral head of the left hip.  Impression: Advanced osteoarthrosis left hip.         Assessment: Primary osteoarthritis of right knee    Pain in both knees, unspecified chronicity  -     Cancel: X-Ray Knee 3 View Left; Future; Expected date: 06/26/2023  -     Cancel: X-Ray Knee Complete 4 Or More Views Right; Future; Expected date: 06/26/2023  -     X-Ray Knee Complete 4 Or More Views Bilat; Future; Expected date: 06/26/2023    Primary osteoarthritis of left hip    Arthrofibrosis of total knee arthroplasty,  sequela        Plan:  Robotic assisted right total knee arthroplasty    Risks, benefits, alternatives, and complications were explained to the patient and/or patient representative. They understand, agree, and want to proceed with the operation/ procedure.        DISCLAIMER: This note may have been dictated using voice recognition software and may contain grammatical errors.     NOTE: Consult report sent to referring provider via [employer] EMR.

## 2023-06-28 DIAGNOSIS — M17.11 PRIMARY OSTEOARTHRITIS OF RIGHT KNEE: Primary | ICD-10-CM

## 2023-07-28 ENCOUNTER — HOSPITAL ENCOUNTER (OUTPATIENT)
Dept: RADIOLOGY | Facility: HOSPITAL | Age: 73
Discharge: HOME | End: 2023-07-28
Attending: PHYSICIAN ASSISTANT
Payer: MEDICARE

## 2023-07-28 ENCOUNTER — OFFICE VISIT (OUTPATIENT)
Dept: ORTHOPEDICS | Facility: CLINIC | Age: 73
End: 2023-07-28
Payer: MEDICARE

## 2023-07-28 VITALS
BODY MASS INDEX: 31.79 KG/M2 | DIASTOLIC BLOOD PRESSURE: 81 MMHG | HEIGHT: 61 IN | HEART RATE: 71 BPM | WEIGHT: 168.38 LBS | SYSTOLIC BLOOD PRESSURE: 145 MMHG

## 2023-07-28 DIAGNOSIS — R79.9 ABNORMAL BLOOD CHEMISTRY: ICD-10-CM

## 2023-07-28 DIAGNOSIS — I10 HYPERTENSION, UNSPECIFIED TYPE: ICD-10-CM

## 2023-07-28 DIAGNOSIS — M17.11 PRIMARY OSTEOARTHRITIS OF RIGHT KNEE: Primary | ICD-10-CM

## 2023-07-28 DIAGNOSIS — R26.89 IMPAIRED GAIT AND MOBILITY: ICD-10-CM

## 2023-07-28 DIAGNOSIS — M17.11 PRIMARY OSTEOARTHRITIS OF RIGHT KNEE: ICD-10-CM

## 2023-07-28 DIAGNOSIS — Z01.812 PRE-OPERATIVE LABORATORY EXAMINATION: ICD-10-CM

## 2023-07-28 LAB — MRSA PCR SCRN (OHS): NOT DETECTED

## 2023-07-28 PROCEDURE — 99214 PR OFFICE/OUTPT VISIT, EST, LEVL IV, 30-39 MIN: ICD-10-PCS | Mod: ,,, | Performed by: PHYSICIAN ASSISTANT

## 2023-07-28 PROCEDURE — 71046 X-RAY EXAM CHEST 2 VIEWS: CPT | Mod: TC

## 2023-07-28 PROCEDURE — 99214 OFFICE O/P EST MOD 30 MIN: CPT | Mod: ,,, | Performed by: PHYSICIAN ASSISTANT

## 2023-07-28 PROCEDURE — 73700 CT LOWER EXTREMITY W/O DYE: CPT | Mod: TC,RT

## 2023-07-28 RX ORDER — GABAPENTIN 100 MG/1
300 CAPSULE ORAL
Status: CANCELLED | OUTPATIENT
Start: 2023-07-28

## 2023-07-28 RX ORDER — SCOLOPAMINE TRANSDERMAL SYSTEM 1 MG/1
1 PATCH, EXTENDED RELEASE TRANSDERMAL ONCE AS NEEDED
Status: CANCELLED | OUTPATIENT
Start: 2023-07-28 | End: 2034-12-23

## 2023-07-28 RX ORDER — ACETAMINOPHEN 500 MG
1000 TABLET ORAL
Status: CANCELLED | OUTPATIENT
Start: 2023-07-28

## 2023-07-28 RX ORDER — KETOROLAC TROMETHAMINE 10 MG/1
10 TABLET, FILM COATED ORAL
Status: CANCELLED | OUTPATIENT
Start: 2023-07-28 | End: 2023-07-28

## 2023-07-28 RX ORDER — TRANEXAMIC ACID 650 MG/1
1950 TABLET ORAL
Status: CANCELLED | OUTPATIENT
Start: 2023-07-28 | End: 2023-07-28

## 2023-07-28 RX ORDER — SODIUM CHLORIDE, SODIUM GLUCONATE, SODIUM ACETATE, POTASSIUM CHLORIDE AND MAGNESIUM CHLORIDE 30; 37; 368; 526; 502 MG/100ML; MG/100ML; MG/100ML; MG/100ML; MG/100ML
INJECTION, SOLUTION INTRAVENOUS CONTINUOUS
Status: CANCELLED | OUTPATIENT
Start: 2023-07-28

## 2023-07-28 NOTE — H&P
Past Medical History:   Diagnosis Date    Acid reflux     Arthritis     CLL (chronic lymphocytic leukemia)     Hypertension        Past Surgical History:   Procedure Laterality Date    BREAST SURGERY Left 1991?    lumpectomy    CARPAL TUNNEL RELEASE      CYST REMOVAL      EPIDURAL STEROID INJECTION      JOINT REPLACEMENT  07/21    Left knee    KNEE JOINT MANIPULATION Left 8/29/2022    Procedure: MANIPULATION, KNEE;  Surgeon: Sohan Bowens MD;  Location: [facility];  Service: Orthopedics;  Laterality: Left;    REVISION OF KNEE ARTHROPLASTY Left 06/08/2022    Procedure: REVISION, ARTHROPLASTY, KNEE;  Surgeon: Sohan Bowens MD;  Location: [facility] OR;  Service: Orthopedics;  Laterality: Left;  FEMUR / PATHOLOGY / ASHLEY    SHOULDER SURGERY      SINUS SURGERY      x 2    SPINE SURGERY  05/21    Bulging disc in lower back    THYROID SURGERY      TONSILLECTOMY         Current Outpatient Medications   Medication Sig    amLODIPine (NORVASC) 5 MG tablet Take 1 tablet (5 mg total) by mouth every evening.    B-complex with vitamin C (Z-BEC OR EQUIV) tablet Take 1 tablet by mouth once daily.    cholecalciferol, vitamin D3, 125 mcg (5,000 unit) capsule Take 5,000 Units by mouth once daily.    lisdexamfetamine (VYVANSE) 70 MG capsule Take 70 mg by mouth every morning.    metoprolol tartrate (LOPRESSOR) 50 MG tablet Take 75 mg by mouth once daily at 6am.    pantoprazole (PROTONIX) 40 MG tablet     polysaccharide iron complex 200 mg iron Cap Take by mouth 2 (two) times daily.    pramipexole (MIRAPEX) 1.5 MG tablet Take 1.5 mg by mouth 2 (two) times a day.    telmisartan-hydrochlorothiazide (MICARDIS HCT) 40-12.5 mg per tablet Take 1 tablet by mouth once daily.    vitamin B complex (B COMPLEX 1 ORAL)      No current facility-administered medications for this visit.       Review of patient's allergies indicates:   Allergen Reactions    Duloxetine      Other reaction(s): Bradycardia, Hypotension, Vomit    Lorazepam      Other  reaction(s): Confusion  amnesia      Tramadol Other (See Comments)     nightmares       Family History   Problem Relation Age of Onset    Hypertension Mother     COPD Mother     Hearing loss Mother     Hypertension Father     COPD Father     Hearing loss Father     Breast cancer Sister     Cancer Sister     Diabetes Maternal Grandmother         Reina    Diabetes Paternal Grandmother        Social History     Socioeconomic History    Marital status:    Tobacco Use    Smoking status: Former     Packs/day: 1.00     Years: 10.00     Pack years: 10.00     Types: Cigarettes     Quit date: 3/18/1986     Years since quittin.3    Smokeless tobacco: Former   Substance and Sexual Activity    Alcohol use: Not Currently    Drug use: Never    Sexual activity: Yes     Partners: Male     Birth control/protection: Post-menopausal       Chief Complaint:   Chief Complaint   Patient presents with    Pre-op Exam     pre op for R TKA sx 23.       Consulting Physician: No ref. provider found    History of present illness:    This is a 72 y.o. year old female who presents today for preoperative evaluation for robotic assisted right total knee arthroplasty on .  She is a known history of advanced osteoarthritis.  She has ongoing medial-sided knee pain that is worse with weight-bearing activity.  She also has some decreased range of motion which has decreased activities of daily living.  She has a history of left total knee arthroplasty with some arthrofibrosis.  She is had no relief with conservative treatment consisting of injections and physical therapy.  She is ready to undergo the operation    Review of Systems:    Constitution:   Denies chills, fever, and sweats.  HENT:   Denies headaches or blurry vision.  Cardiovascular:  Denies chest pain or irregular heart beat.  Respiratory:   Denies cough or shortness of breath.  Gastrointestinal:  Denies abdominal pain, nausea, or vomiting.  Musculoskeletal:   " Denies muscle cramps.  Neurological:   Denies dizziness or focal weakness.  Psychiatric/Behavior: Normal mental status.  Hematology/Lymph:  Denies bleeding problem or easy bruising/bleeding.  Skin:    Denies rash or suspicious lesions.    Examination:    Vital Signs:    Vitals:    23 0850   BP: (!) 145/81   Pulse: 71   Weight: 76.4 kg (168 lb 6.4 oz)   Height: 5' 1" (1.549 m)   PainSc:   6       Body mass index is 31.82 kg/m².    Constitution:   Well-developed, well nourished patient in no acute distress.  Neurological:   Alert and oriented x 3 and cooperative to examination.     Psychiatric/Behavior: Normal mental status.  Respiratory:   No shortness of breath.non labored breathing.  Cardiovascular: Regular rate and rhythm  Eyes:    Extraoccular muscles intact  Skin:    No scars, rash or suspicious lesions.    Physical Exam:  Right knee exam:       General Musculoskeletal Exam   Gait: antalgic       Right Knee Exam     Inspection   Erythema: absent  Effusion: present  Deformity: present  Bruising: absent    Tenderness   The patient is tender to palpation of the medial joint line, MCL, condyle and medial retinaculum.    Crepitus   The patient has crepitus of the medial joint line.    Range of Motion   Extension: abnormal   Flexion: abnormal   5° to 120°    Tests   Meniscus   Kerwin:  Medial - positive Lateral - negative  Ligament Examination   MCL - Valgus: normal (0 to 2mm)  LCL - Varus: normal  Patella   Passive Patellar Tilt: neutral    Other   Sensation: normal    Comments:  Varus deformity    Muscle Strength   Right Lower Extremity   Quadriceps:  5/5   Hamstrin/5     Vascular Exam     Right Pulses  Dorsalis Pedis:      2+  Posterior Tibial:      2+          Imaging: X-rays reviewed of the right and left knees which show pristine interfaces and stable well-aligned revision left total knee arthroplasty with no evidence of fracture or loosening.  Patient is bone-on-bone in the medial compartment of " the right knee with varus deformity and sclerosis in the medial compartment of the right knee.  Tricompartmental osteophytes.  Grade 4 changes in the medial compartment of the right knee.  Impression:  Advanced osteoarthrosis right knee and stable well-aligned revision left total knee arthroplasty           Assessment: Primary osteoarthritis of right knee    Hypertension, unspecified type    Impaired gait and mobility    Pre-operative laboratory examination        Plan:  Robotic assisted right total knee arthroplasty  We will use Ecotrin aspirin postoperatively for DVT prophylaxis    Risks, benefits, alternatives, and complications were explained to the patient and/or patient representative. They understand, agree, and want to proceed with the operation/ procedure.        DISCLAIMER: This note may have been dictated using voice recognition software and may contain grammatical errors.     NOTE: Consult report sent to referring provider via [employer] EMR.

## 2023-07-28 NOTE — H&P (VIEW-ONLY)
Past Medical History:   Diagnosis Date    Acid reflux     Arthritis     CLL (chronic lymphocytic leukemia)     Hypertension        Past Surgical History:   Procedure Laterality Date    BREAST SURGERY Left 1991?    lumpectomy    CARPAL TUNNEL RELEASE      CYST REMOVAL      EPIDURAL STEROID INJECTION      JOINT REPLACEMENT  07/21    Left knee    KNEE JOINT MANIPULATION Left 8/29/2022    Procedure: MANIPULATION, KNEE;  Surgeon: Sohan Bowens MD;  Location: [facility];  Service: Orthopedics;  Laterality: Left;    REVISION OF KNEE ARTHROPLASTY Left 06/08/2022    Procedure: REVISION, ARTHROPLASTY, KNEE;  Surgeon: Sohan Bowens MD;  Location: [facility] OR;  Service: Orthopedics;  Laterality: Left;  FEMUR / PATHOLOGY / ASHLEY    SHOULDER SURGERY      SINUS SURGERY      x 2    SPINE SURGERY  05/21    Bulging disc in lower back    THYROID SURGERY      TONSILLECTOMY         Current Outpatient Medications   Medication Sig    amLODIPine (NORVASC) 5 MG tablet Take 1 tablet (5 mg total) by mouth every evening.    B-complex with vitamin C (Z-BEC OR EQUIV) tablet Take 1 tablet by mouth once daily.    cholecalciferol, vitamin D3, 125 mcg (5,000 unit) capsule Take 5,000 Units by mouth once daily.    lisdexamfetamine (VYVANSE) 70 MG capsule Take 70 mg by mouth every morning.    metoprolol tartrate (LOPRESSOR) 50 MG tablet Take 75 mg by mouth once daily at 6am.    pantoprazole (PROTONIX) 40 MG tablet     polysaccharide iron complex 200 mg iron Cap Take by mouth 2 (two) times daily.    pramipexole (MIRAPEX) 1.5 MG tablet Take 1.5 mg by mouth 2 (two) times a day.    telmisartan-hydrochlorothiazide (MICARDIS HCT) 40-12.5 mg per tablet Take 1 tablet by mouth once daily.    vitamin B complex (B COMPLEX 1 ORAL)      No current facility-administered medications for this visit.       Review of patient's allergies indicates:   Allergen Reactions    Duloxetine      Other reaction(s): Bradycardia, Hypotension, Vomit    Lorazepam      Other  reaction(s): Confusion  amnesia      Tramadol Other (See Comments)     nightmares       Family History   Problem Relation Age of Onset    Hypertension Mother     COPD Mother     Hearing loss Mother     Hypertension Father     COPD Father     Hearing loss Father     Breast cancer Sister     Cancer Sister     Diabetes Maternal Grandmother         Reina    Diabetes Paternal Grandmother        Social History     Socioeconomic History    Marital status:    Tobacco Use    Smoking status: Former     Packs/day: 1.00     Years: 10.00     Pack years: 10.00     Types: Cigarettes     Quit date: 3/18/1986     Years since quittin.3    Smokeless tobacco: Former   Substance and Sexual Activity    Alcohol use: Not Currently    Drug use: Never    Sexual activity: Yes     Partners: Male     Birth control/protection: Post-menopausal       Chief Complaint:   Chief Complaint   Patient presents with    Pre-op Exam     pre op for R TKA sx 23.       Consulting Physician: No ref. provider found    History of present illness:    This is a 72 y.o. year old female who presents today for preoperative evaluation for robotic assisted right total knee arthroplasty on .  She is a known history of advanced osteoarthritis.  She has ongoing medial-sided knee pain that is worse with weight-bearing activity.  She also has some decreased range of motion which has decreased activities of daily living.  She has a history of left total knee arthroplasty with some arthrofibrosis.  She is had no relief with conservative treatment consisting of injections and physical therapy.  She is ready to undergo the operation    Review of Systems:    Constitution:   Denies chills, fever, and sweats.  HENT:   Denies headaches or blurry vision.  Cardiovascular:  Denies chest pain or irregular heart beat.  Respiratory:   Denies cough or shortness of breath.  Gastrointestinal:  Denies abdominal pain, nausea, or vomiting.  Musculoskeletal:   " Denies muscle cramps.  Neurological:   Denies dizziness or focal weakness.  Psychiatric/Behavior: Normal mental status.  Hematology/Lymph:  Denies bleeding problem or easy bruising/bleeding.  Skin:    Denies rash or suspicious lesions.    Examination:    Vital Signs:    Vitals:    23 0850   BP: (!) 145/81   Pulse: 71   Weight: 76.4 kg (168 lb 6.4 oz)   Height: 5' 1" (1.549 m)   PainSc:   6       Body mass index is 31.82 kg/m².    Constitution:   Well-developed, well nourished patient in no acute distress.  Neurological:   Alert and oriented x 3 and cooperative to examination.     Psychiatric/Behavior: Normal mental status.  Respiratory:   No shortness of breath.non labored breathing.  Cardiovascular: Regular rate and rhythm  Eyes:    Extraoccular muscles intact  Skin:    No scars, rash or suspicious lesions.    Physical Exam:  Right knee exam:       General Musculoskeletal Exam   Gait: antalgic       Right Knee Exam     Inspection   Erythema: absent  Effusion: present  Deformity: present  Bruising: absent    Tenderness   The patient is tender to palpation of the medial joint line, MCL, condyle and medial retinaculum.    Crepitus   The patient has crepitus of the medial joint line.    Range of Motion   Extension: abnormal   Flexion: abnormal   5° to 120°    Tests   Meniscus   Kerwin:  Medial - positive Lateral - negative  Ligament Examination   MCL - Valgus: normal (0 to 2mm)  LCL - Varus: normal  Patella   Passive Patellar Tilt: neutral    Other   Sensation: normal    Comments:  Varus deformity    Muscle Strength   Right Lower Extremity   Quadriceps:  5/5   Hamstrin/5     Vascular Exam     Right Pulses  Dorsalis Pedis:      2+  Posterior Tibial:      2+          Imaging: X-rays reviewed of the right and left knees which show pristine interfaces and stable well-aligned revision left total knee arthroplasty with no evidence of fracture or loosening.  Patient is bone-on-bone in the medial compartment of " the right knee with varus deformity and sclerosis in the medial compartment of the right knee.  Tricompartmental osteophytes.  Grade 4 changes in the medial compartment of the right knee.  Impression:  Advanced osteoarthrosis right knee and stable well-aligned revision left total knee arthroplasty           Assessment: Primary osteoarthritis of right knee    Hypertension, unspecified type    Impaired gait and mobility    Pre-operative laboratory examination        Plan:  Robotic assisted right total knee arthroplasty  We will use Ecotrin aspirin postoperatively for DVT prophylaxis    Risks, benefits, alternatives, and complications were explained to the patient and/or patient representative. They understand, agree, and want to proceed with the operation/ procedure.        DISCLAIMER: This note may have been dictated using voice recognition software and may contain grammatical errors.     NOTE: Consult report sent to referring provider via [employer] EMR.

## 2023-08-10 ENCOUNTER — ANESTHESIA EVENT (OUTPATIENT)
Dept: SURGERY | Facility: HOSPITAL | Age: 73
End: 2023-08-10
Payer: MEDICARE

## 2023-08-16 RX ORDER — SODIUM CHLORIDE, SODIUM GLUCONATE, SODIUM ACETATE, POTASSIUM CHLORIDE AND MAGNESIUM CHLORIDE 30; 37; 368; 526; 502 MG/100ML; MG/100ML; MG/100ML; MG/100ML; MG/100ML
INJECTION, SOLUTION INTRAVENOUS CONTINUOUS
Status: CANCELLED | OUTPATIENT
Start: 2023-08-16 | End: 2023-09-15

## 2023-08-16 RX ORDER — LIDOCAINE HYDROCHLORIDE 10 MG/ML
1 INJECTION, SOLUTION EPIDURAL; INFILTRATION; INTRACAUDAL; PERINEURAL ONCE
Status: CANCELLED | OUTPATIENT
Start: 2023-08-16 | End: 2023-08-16

## 2023-08-16 RX ORDER — SODIUM CITRATE AND CITRIC ACID MONOHYDRATE 334; 500 MG/5ML; MG/5ML
30 SOLUTION ORAL ONCE
Status: CANCELLED | OUTPATIENT
Start: 2023-08-16 | End: 2023-08-16

## 2023-08-16 RX ORDER — ACETAMINOPHEN 500 MG
1000 TABLET ORAL ONCE
Status: CANCELLED | OUTPATIENT
Start: 2023-08-16 | End: 2023-08-16

## 2023-08-17 ENCOUNTER — ANESTHESIA (OUTPATIENT)
Dept: SURGERY | Facility: HOSPITAL | Age: 73
End: 2023-08-17
Payer: MEDICARE

## 2023-08-17 ENCOUNTER — HOSPITAL ENCOUNTER (OUTPATIENT)
Facility: HOSPITAL | Age: 73
Discharge: HOME | End: 2023-08-18
Attending: SPECIALIST | Admitting: SPECIALIST
Payer: MEDICARE

## 2023-08-17 DIAGNOSIS — R26.89 IMPAIRED GAIT AND MOBILITY: ICD-10-CM

## 2023-08-17 DIAGNOSIS — R79.9 ABNORMAL BLOOD CHEMISTRY: ICD-10-CM

## 2023-08-17 DIAGNOSIS — M17.11 PRIMARY OSTEOARTHRITIS OF RIGHT KNEE: ICD-10-CM

## 2023-08-17 DIAGNOSIS — Z01.812 PRE-OPERATIVE LABORATORY EXAMINATION: ICD-10-CM

## 2023-08-17 DIAGNOSIS — I10 HYPERTENSION, UNSPECIFIED TYPE: ICD-10-CM

## 2023-08-17 PROBLEM — K21.9 GASTROESOPHAGEAL REFLUX DISEASE: Status: ACTIVE | Noted: 2020-02-26

## 2023-08-17 PROBLEM — R06.00 DYSPNEA: Status: ACTIVE | Noted: 2021-03-01

## 2023-08-17 PROBLEM — I51.89 DIASTOLIC DYSFUNCTION: Status: ACTIVE | Noted: 2021-07-19

## 2023-08-17 PROBLEM — G93.32 CHRONIC FATIGUE SYNDROME: Status: ACTIVE | Noted: 2021-03-01

## 2023-08-17 PROBLEM — R35.0 INCREASED FREQUENCY OF URINATION: Status: ACTIVE | Noted: 2021-03-01

## 2023-08-17 PROBLEM — I34.1 MITRAL VALVE PROLAPSE: Status: ACTIVE | Noted: 2021-09-28

## 2023-08-17 PROBLEM — R73.01 IMPAIRED FASTING GLUCOSE: Status: ACTIVE | Noted: 2021-03-02

## 2023-08-17 PROBLEM — G25.81 RESTLESS LEGS SYNDROME: Status: ACTIVE | Noted: 2020-02-26

## 2023-08-17 PROBLEM — G47.33 OBSTRUCTIVE SLEEP APNEA SYNDROME: Status: ACTIVE | Noted: 2020-10-02

## 2023-08-17 PROBLEM — E11.9 TYPE 2 DIABETES MELLITUS WITHOUT COMPLICATION: Status: ACTIVE | Noted: 2021-03-01

## 2023-08-17 PROBLEM — I27.20 PULMONARY HYPERTENSION: Status: ACTIVE | Noted: 2021-09-28

## 2023-08-17 PROBLEM — E66.9 OBESITY: Status: ACTIVE | Noted: 2023-08-17

## 2023-08-17 PROBLEM — N18.30 STAGE 3 CHRONIC KIDNEY DISEASE: Status: ACTIVE | Noted: 2023-03-23

## 2023-08-17 PROBLEM — C91.10 CHRONIC LYMPHOCYTIC LEUKEMIA: Status: ACTIVE | Noted: 2021-06-18

## 2023-08-17 PROBLEM — F90.0 ATTENTION DEFICIT HYPERACTIVITY DISORDER, PREDOMINANTLY INATTENTIVE TYPE: Status: ACTIVE | Noted: 2020-02-26

## 2023-08-17 PROBLEM — Z86.79 HISTORY OF VARICOSE VEINS: Status: ACTIVE | Noted: 2020-10-02

## 2023-08-17 LAB
HCT VFR BLD AUTO: 31.6 % (ref 37–47)
HGB BLD-MCNC: 10.6 G/DL (ref 12–16)
POCT GLUCOSE: 92 MG/DL (ref 70–110)

## 2023-08-17 PROCEDURE — 88311 DECALCIFY TISSUE: CPT

## 2023-08-17 PROCEDURE — 63600175 PHARM REV CODE 636 W HCPCS: Performed by: ANESTHESIOLOGY

## 2023-08-17 PROCEDURE — 51798 US URINE CAPACITY MEASURE: CPT | Performed by: SPECIALIST

## 2023-08-17 PROCEDURE — C1776 JOINT DEVICE (IMPLANTABLE): HCPCS | Performed by: SPECIALIST

## 2023-08-17 PROCEDURE — 63600175 PHARM REV CODE 636 W HCPCS: Performed by: SPECIALIST

## 2023-08-17 PROCEDURE — D9220A PRA ANESTHESIA: ICD-10-PCS | Mod: ANES,,, | Performed by: ANESTHESIOLOGY

## 2023-08-17 PROCEDURE — 27201423 OPTIME MED/SURG SUP & DEVICES STERILE SUPPLY: Performed by: SPECIALIST

## 2023-08-17 PROCEDURE — 25000003 PHARM REV CODE 250: Performed by: PHYSICIAN ASSISTANT

## 2023-08-17 PROCEDURE — 82962 GLUCOSE BLOOD TEST: CPT | Performed by: SPECIALIST

## 2023-08-17 PROCEDURE — 27447 PR TOTAL KNEE ARTHROPLASTY: ICD-10-PCS | Mod: RT,,, | Performed by: SPECIALIST

## 2023-08-17 PROCEDURE — 0055T BONE SRGRY CMPTR CT/MRI IMAG: CPT | Mod: ,,, | Performed by: SPECIALIST

## 2023-08-17 PROCEDURE — C1769 GUIDE WIRE: HCPCS | Performed by: SPECIALIST

## 2023-08-17 PROCEDURE — 94799 UNLISTED PULMONARY SVC/PX: CPT | Mod: XB

## 2023-08-17 PROCEDURE — 36000712 HC OR TIME LEV V 1ST 15 MIN: Performed by: SPECIALIST

## 2023-08-17 PROCEDURE — 71000039 HC RECOVERY, EACH ADD'L HOUR: Performed by: SPECIALIST

## 2023-08-17 PROCEDURE — 36000713 HC OR TIME LEV V EA ADD 15 MIN: Performed by: SPECIALIST

## 2023-08-17 PROCEDURE — 88305 TISSUE EXAM BY PATHOLOGIST: CPT | Performed by: SPECIALIST

## 2023-08-17 PROCEDURE — C1713 ANCHOR/SCREW BN/BN,TIS/BN: HCPCS | Performed by: SPECIALIST

## 2023-08-17 PROCEDURE — 25000003 PHARM REV CODE 250: Performed by: ANESTHESIOLOGY

## 2023-08-17 PROCEDURE — 71000033 HC RECOVERY, INTIAL HOUR: Performed by: SPECIALIST

## 2023-08-17 PROCEDURE — 0055T PR COMPUTER-ASSIST MUSCSKEL NAVIG, ORTHO PROC, CT/MRI: ICD-10-PCS | Mod: ,,, | Performed by: SPECIALIST

## 2023-08-17 PROCEDURE — 63600175 PHARM REV CODE 636 W HCPCS: Performed by: NURSE ANESTHETIST, CERTIFIED REGISTERED

## 2023-08-17 PROCEDURE — 25000003 PHARM REV CODE 250: Performed by: SPECIALIST

## 2023-08-17 PROCEDURE — A4216 STERILE WATER/SALINE, 10 ML: HCPCS | Performed by: SPECIALIST

## 2023-08-17 PROCEDURE — 27447 PR TOTAL KNEE ARTHROPLASTY: ICD-10-PCS | Mod: AS,RT,, | Performed by: PHYSICIAN ASSISTANT

## 2023-08-17 PROCEDURE — 64447 NJX AA&/STRD FEMORAL NRV IMG: CPT | Performed by: ANESTHESIOLOGY

## 2023-08-17 PROCEDURE — 27447 TOTAL KNEE ARTHROPLASTY: CPT | Mod: RT,,, | Performed by: SPECIALIST

## 2023-08-17 PROCEDURE — D9220A PRA ANESTHESIA: Mod: ANES,,, | Performed by: ANESTHESIOLOGY

## 2023-08-17 PROCEDURE — D9220A PRA ANESTHESIA: ICD-10-PCS | Mod: CRNA,,, | Performed by: NURSE ANESTHETIST, CERTIFIED REGISTERED

## 2023-08-17 PROCEDURE — 85014 HEMATOCRIT: CPT | Performed by: SPECIALIST

## 2023-08-17 PROCEDURE — 37000009 HC ANESTHESIA EA ADD 15 MINS: Performed by: SPECIALIST

## 2023-08-17 PROCEDURE — 63600175 PHARM REV CODE 636 W HCPCS: Performed by: PHYSICIAN ASSISTANT

## 2023-08-17 PROCEDURE — 37000008 HC ANESTHESIA 1ST 15 MINUTES: Performed by: SPECIALIST

## 2023-08-17 PROCEDURE — G0378 HOSPITAL OBSERVATION PER HR: HCPCS

## 2023-08-17 PROCEDURE — 97162 PT EVAL MOD COMPLEX 30 MIN: CPT

## 2023-08-17 PROCEDURE — 94761 N-INVAS EAR/PLS OXIMETRY MLT: CPT

## 2023-08-17 PROCEDURE — 27447 TOTAL KNEE ARTHROPLASTY: CPT | Mod: AS,RT,, | Performed by: PHYSICIAN ASSISTANT

## 2023-08-17 PROCEDURE — 64447 PERIPHERAL BLOCK: ICD-10-PCS | Mod: 59,RT,, | Performed by: ANESTHESIOLOGY

## 2023-08-17 PROCEDURE — 97530 THERAPEUTIC ACTIVITIES: CPT

## 2023-08-17 PROCEDURE — D9220A PRA ANESTHESIA: Mod: CRNA,,, | Performed by: NURSE ANESTHETIST, CERTIFIED REGISTERED

## 2023-08-17 PROCEDURE — 25000003 PHARM REV CODE 250: Performed by: NURSE ANESTHETIST, CERTIFIED REGISTERED

## 2023-08-17 PROCEDURE — 51798 US URINE CAPACITY MEASURE: CPT

## 2023-08-17 DEVICE — TIBIAL BEARING INSERT - CR
Type: IMPLANTABLE DEVICE | Site: KNEE | Status: FUNCTIONAL
Brand: TRIATHLON

## 2023-08-17 DEVICE — CRUCIATE RETAINING FEMORAL
Type: IMPLANTABLE DEVICE | Site: KNEE | Status: FUNCTIONAL
Brand: TRIATHLON

## 2023-08-17 RX ORDER — SODIUM CITRATE AND CITRIC ACID MONOHYDRATE 334; 500 MG/5ML; MG/5ML
30 SOLUTION ORAL ONCE
Status: COMPLETED | OUTPATIENT
Start: 2023-08-17 | End: 2023-08-17

## 2023-08-17 RX ORDER — GABAPENTIN 300 MG/1
300 CAPSULE ORAL
Status: COMPLETED | OUTPATIENT
Start: 2023-08-17 | End: 2023-08-17

## 2023-08-17 RX ORDER — HYDROMORPHONE HYDROCHLORIDE 2 MG/ML
0.2 INJECTION, SOLUTION INTRAMUSCULAR; INTRAVENOUS; SUBCUTANEOUS EVERY 5 MIN PRN
Status: DISCONTINUED | OUTPATIENT
Start: 2023-08-17 | End: 2023-08-17 | Stop reason: HOSPADM

## 2023-08-17 RX ORDER — HYDROCODONE BITARTRATE AND ACETAMINOPHEN 5; 325 MG/1; MG/1
1 TABLET ORAL EVERY 4 HOURS PRN
Status: DISCONTINUED | OUTPATIENT
Start: 2023-08-17 | End: 2023-08-17

## 2023-08-17 RX ORDER — MORPHINE SULFATE 10 MG/ML
INJECTION INTRAMUSCULAR; INTRAVENOUS; SUBCUTANEOUS
Status: DISCONTINUED | OUTPATIENT
Start: 2023-08-17 | End: 2023-08-17 | Stop reason: HOSPADM

## 2023-08-17 RX ORDER — GENTAMICIN SULFATE 40 MG/ML
INJECTION, SOLUTION INTRAMUSCULAR; INTRAVENOUS
Status: DISPENSED
Start: 2023-08-17 | End: 2023-08-17

## 2023-08-17 RX ORDER — DOCUSATE SODIUM 100 MG/1
200 CAPSULE, LIQUID FILLED ORAL DAILY
Status: DISCONTINUED | OUTPATIENT
Start: 2023-08-18 | End: 2023-08-18 | Stop reason: HOSPADM

## 2023-08-17 RX ORDER — KETOROLAC TROMETHAMINE 10 MG/1
10 TABLET, FILM COATED ORAL
Status: COMPLETED | OUTPATIENT
Start: 2023-08-17 | End: 2023-08-17

## 2023-08-17 RX ORDER — HYDROCHLOROTHIAZIDE 12.5 MG/1
12.5 TABLET ORAL DAILY
Status: DISCONTINUED | OUTPATIENT
Start: 2023-08-18 | End: 2023-08-18 | Stop reason: HOSPADM

## 2023-08-17 RX ORDER — LACTULOSE 10 G/15ML
20 SOLUTION ORAL EVERY 6 HOURS PRN
Status: DISCONTINUED | OUTPATIENT
Start: 2023-08-17 | End: 2023-08-18 | Stop reason: HOSPADM

## 2023-08-17 RX ORDER — MIDAZOLAM HYDROCHLORIDE 1 MG/ML
INJECTION INTRAMUSCULAR; INTRAVENOUS
Status: DISCONTINUED | OUTPATIENT
Start: 2023-08-17 | End: 2023-08-17

## 2023-08-17 RX ORDER — SODIUM CHLORIDE 9 MG/ML
INJECTION, SOLUTION INTRAMUSCULAR; INTRAVENOUS; SUBCUTANEOUS
Status: DISCONTINUED | OUTPATIENT
Start: 2023-08-17 | End: 2023-08-17 | Stop reason: HOSPADM

## 2023-08-17 RX ORDER — FAMOTIDINE 20 MG/1
20 TABLET, FILM COATED ORAL 2 TIMES DAILY
Status: DISCONTINUED | OUTPATIENT
Start: 2023-08-17 | End: 2023-08-18 | Stop reason: HOSPADM

## 2023-08-17 RX ORDER — ACETAMINOPHEN 500 MG
1000 TABLET ORAL
Status: COMPLETED | OUTPATIENT
Start: 2023-08-17 | End: 2023-08-17

## 2023-08-17 RX ORDER — ONDANSETRON 2 MG/ML
4 INJECTION INTRAMUSCULAR; INTRAVENOUS DAILY PRN
Status: DISCONTINUED | OUTPATIENT
Start: 2023-08-17 | End: 2023-08-17

## 2023-08-17 RX ORDER — SODIUM CHLORIDE 9 MG/ML
INJECTION, SOLUTION INTRAVENOUS CONTINUOUS
Status: DISCONTINUED | OUTPATIENT
Start: 2023-08-17 | End: 2023-08-18 | Stop reason: HOSPADM

## 2023-08-17 RX ORDER — VANCOMYCIN HYDROCHLORIDE 1 G/20ML
INJECTION, POWDER, LYOPHILIZED, FOR SOLUTION INTRAVENOUS
Status: DISCONTINUED
Start: 2023-08-17 | End: 2023-08-17 | Stop reason: WASHOUT

## 2023-08-17 RX ORDER — BISACODYL 10 MG
10 SUPPOSITORY, RECTAL RECTAL DAILY
Status: DISCONTINUED | OUTPATIENT
Start: 2023-08-20 | End: 2023-08-18 | Stop reason: HOSPADM

## 2023-08-17 RX ORDER — POLYETHYLENE GLYCOL 3350 17 G/17G
17 POWDER, FOR SOLUTION ORAL NIGHTLY
Status: DISCONTINUED | OUTPATIENT
Start: 2023-08-17 | End: 2023-08-18 | Stop reason: HOSPADM

## 2023-08-17 RX ORDER — PROCHLORPERAZINE EDISYLATE 5 MG/ML
5 INJECTION INTRAMUSCULAR; INTRAVENOUS EVERY 30 MIN PRN
Status: DISCONTINUED | OUTPATIENT
Start: 2023-08-17 | End: 2023-08-17 | Stop reason: HOSPADM

## 2023-08-17 RX ORDER — IBUPROFEN 200 MG
24 TABLET ORAL
Status: DISCONTINUED | OUTPATIENT
Start: 2023-08-17 | End: 2023-08-18 | Stop reason: HOSPADM

## 2023-08-17 RX ORDER — AMLODIPINE BESYLATE 5 MG/1
5 TABLET ORAL NIGHTLY
Status: DISCONTINUED | OUTPATIENT
Start: 2023-08-17 | End: 2023-08-18 | Stop reason: HOSPADM

## 2023-08-17 RX ORDER — PHENYLEPHRINE HYDROCHLORIDE 10 MG/ML
INJECTION INTRAVENOUS
Status: DISCONTINUED | OUTPATIENT
Start: 2023-08-17 | End: 2023-08-17

## 2023-08-17 RX ORDER — KETOROLAC TROMETHAMINE 30 MG/ML
INJECTION, SOLUTION INTRAMUSCULAR; INTRAVENOUS
Status: DISPENSED
Start: 2023-08-17 | End: 2023-08-17

## 2023-08-17 RX ORDER — GABAPENTIN 300 MG/1
300 CAPSULE ORAL NIGHTLY
Status: DISCONTINUED | OUTPATIENT
Start: 2023-08-17 | End: 2023-08-18 | Stop reason: HOSPADM

## 2023-08-17 RX ORDER — INSULIN ASPART 100 [IU]/ML
1-10 INJECTION, SOLUTION INTRAVENOUS; SUBCUTANEOUS
Status: DISCONTINUED | OUTPATIENT
Start: 2023-08-17 | End: 2023-08-18 | Stop reason: HOSPADM

## 2023-08-17 RX ORDER — LIDOCAINE HYDROCHLORIDE 10 MG/ML
1 INJECTION, SOLUTION EPIDURAL; INFILTRATION; INTRACAUDAL; PERINEURAL ONCE
Status: DISCONTINUED | OUTPATIENT
Start: 2023-08-17 | End: 2023-08-17 | Stop reason: HOSPADM

## 2023-08-17 RX ORDER — LISDEXAMFETAMINE DIMESYLATE 70 MG/1
70 CAPSULE ORAL EVERY MORNING
Status: DISCONTINUED | OUTPATIENT
Start: 2023-08-17 | End: 2023-08-17

## 2023-08-17 RX ORDER — ONDANSETRON 2 MG/ML
4 INJECTION INTRAMUSCULAR; INTRAVENOUS EVERY 6 HOURS PRN
Status: DISCONTINUED | OUTPATIENT
Start: 2023-08-17 | End: 2023-08-18 | Stop reason: HOSPADM

## 2023-08-17 RX ORDER — MEPERIDINE HYDROCHLORIDE 25 MG/ML
12.5 INJECTION INTRAMUSCULAR; INTRAVENOUS; SUBCUTANEOUS EVERY 10 MIN PRN
Status: DISCONTINUED | OUTPATIENT
Start: 2023-08-17 | End: 2023-08-17

## 2023-08-17 RX ORDER — SODIUM CHLORIDE, SODIUM GLUCONATE, SODIUM ACETATE, POTASSIUM CHLORIDE AND MAGNESIUM CHLORIDE 30; 37; 368; 526; 502 MG/100ML; MG/100ML; MG/100ML; MG/100ML; MG/100ML
INJECTION, SOLUTION INTRAVENOUS CONTINUOUS
Status: DISCONTINUED | OUTPATIENT
Start: 2023-08-17 | End: 2023-08-17

## 2023-08-17 RX ORDER — IBUPROFEN 200 MG
16 TABLET ORAL
Status: DISCONTINUED | OUTPATIENT
Start: 2023-08-17 | End: 2023-08-18 | Stop reason: HOSPADM

## 2023-08-17 RX ORDER — NAPROXEN SODIUM 220 MG/1
81 TABLET, FILM COATED ORAL 2 TIMES DAILY
Status: DISCONTINUED | OUTPATIENT
Start: 2023-08-18 | End: 2023-08-18 | Stop reason: HOSPADM

## 2023-08-17 RX ORDER — DIPHENHYDRAMINE HYDROCHLORIDE 50 MG/ML
25 INJECTION INTRAMUSCULAR; INTRAVENOUS EVERY 6 HOURS PRN
Status: DISCONTINUED | OUTPATIENT
Start: 2023-08-17 | End: 2023-08-17 | Stop reason: HOSPADM

## 2023-08-17 RX ORDER — KETOROLAC TROMETHAMINE 10 MG/1
10 TABLET, FILM COATED ORAL EVERY 6 HOURS
Status: DISCONTINUED | OUTPATIENT
Start: 2023-08-17 | End: 2023-08-18 | Stop reason: HOSPADM

## 2023-08-17 RX ORDER — HYDROCODONE BITARTRATE AND ACETAMINOPHEN 5; 325 MG/1; MG/1
1 TABLET ORAL EVERY 4 HOURS PRN
Status: DISCONTINUED | OUTPATIENT
Start: 2023-08-17 | End: 2023-08-18 | Stop reason: HOSPADM

## 2023-08-17 RX ORDER — BUPIVACAINE HYDROCHLORIDE 2.5 MG/ML
INJECTION, SOLUTION EPIDURAL; INFILTRATION; INTRACAUDAL
Status: COMPLETED
Start: 2023-08-17 | End: 2023-08-17

## 2023-08-17 RX ORDER — LOSARTAN POTASSIUM 50 MG/1
50 TABLET ORAL DAILY
Status: DISCONTINUED | OUTPATIENT
Start: 2023-08-18 | End: 2023-08-18 | Stop reason: HOSPADM

## 2023-08-17 RX ORDER — TELMISARTAN AND HYDROCHLORTHIAZIDE 40; 12.5 MG/1; MG/1
1 TABLET ORAL DAILY
Status: DISCONTINUED | OUTPATIENT
Start: 2023-08-18 | End: 2023-08-17

## 2023-08-17 RX ORDER — METHOCARBAMOL 750 MG/1
750 TABLET, FILM COATED ORAL EVERY 8 HOURS PRN
Status: DISCONTINUED | OUTPATIENT
Start: 2023-08-17 | End: 2023-08-18 | Stop reason: HOSPADM

## 2023-08-17 RX ORDER — METOCLOPRAMIDE HYDROCHLORIDE 5 MG/ML
10 INJECTION INTRAMUSCULAR; INTRAVENOUS
Status: DISCONTINUED | OUTPATIENT
Start: 2023-08-17 | End: 2023-08-18 | Stop reason: HOSPADM

## 2023-08-17 RX ORDER — GLUCAGON 1 MG
1 KIT INJECTION
Status: DISCONTINUED | OUTPATIENT
Start: 2023-08-17 | End: 2023-08-18 | Stop reason: HOSPADM

## 2023-08-17 RX ORDER — PROCHLORPERAZINE EDISYLATE 5 MG/ML
5 INJECTION INTRAMUSCULAR; INTRAVENOUS EVERY 30 MIN PRN
Status: DISCONTINUED | OUTPATIENT
Start: 2023-08-17 | End: 2023-08-17

## 2023-08-17 RX ORDER — AMOXICILLIN 250 MG
2 CAPSULE ORAL 2 TIMES DAILY
Status: DISCONTINUED | OUTPATIENT
Start: 2023-08-17 | End: 2023-08-18 | Stop reason: HOSPADM

## 2023-08-17 RX ORDER — DEXTROSE 50 % IN WATER (D50W) INTRAVENOUS SYRINGE
25
Status: DISCONTINUED | OUTPATIENT
Start: 2023-08-17 | End: 2023-08-18 | Stop reason: HOSPADM

## 2023-08-17 RX ORDER — TRAMADOL HYDROCHLORIDE 50 MG/1
50 TABLET ORAL EVERY 4 HOURS PRN
Status: DISCONTINUED | OUTPATIENT
Start: 2023-08-17 | End: 2023-08-17

## 2023-08-17 RX ORDER — ACETAMINOPHEN 10 MG/ML
1000 INJECTION, SOLUTION INTRAVENOUS ONCE
Status: COMPLETED | OUTPATIENT
Start: 2023-08-17 | End: 2023-08-17

## 2023-08-17 RX ORDER — DEXTROSE 50 % IN WATER (D50W) INTRAVENOUS SYRINGE
12.5
Status: DISCONTINUED | OUTPATIENT
Start: 2023-08-17 | End: 2023-08-18 | Stop reason: HOSPADM

## 2023-08-17 RX ORDER — PROPOFOL 10 MG/ML
VIAL (ML) INTRAVENOUS CONTINUOUS PRN
Status: DISCONTINUED | OUTPATIENT
Start: 2023-08-17 | End: 2023-08-17

## 2023-08-17 RX ORDER — MEPERIDINE HYDROCHLORIDE 25 MG/ML
12.5 INJECTION INTRAMUSCULAR; INTRAVENOUS; SUBCUTANEOUS EVERY 10 MIN PRN
Status: ACTIVE | OUTPATIENT
Start: 2023-08-17 | End: 2023-08-18

## 2023-08-17 RX ORDER — ONDANSETRON 2 MG/ML
4 INJECTION INTRAMUSCULAR; INTRAVENOUS DAILY PRN
Status: DISCONTINUED | OUTPATIENT
Start: 2023-08-17 | End: 2023-08-17 | Stop reason: HOSPADM

## 2023-08-17 RX ORDER — EPINEPHRINE 1 MG/ML
INJECTION, SOLUTION, CONCENTRATE INTRAVENOUS
Status: DISPENSED
Start: 2023-08-17 | End: 2023-08-17

## 2023-08-17 RX ORDER — SODIUM CHLORIDE 0.9 G/100ML
IRRIGANT IRRIGATION
Status: DISCONTINUED | OUTPATIENT
Start: 2023-08-17 | End: 2023-08-17 | Stop reason: HOSPADM

## 2023-08-17 RX ORDER — BUPIVACAINE HYDROCHLORIDE 2.5 MG/ML
INJECTION, SOLUTION EPIDURAL; INFILTRATION; INTRACAUDAL
Status: DISCONTINUED | OUTPATIENT
Start: 2023-08-17 | End: 2023-08-17

## 2023-08-17 RX ORDER — MORPHINE SULFATE 4 MG/ML
4 INJECTION, SOLUTION INTRAMUSCULAR; INTRAVENOUS
Status: ACTIVE | OUTPATIENT
Start: 2023-08-17 | End: 2023-08-18

## 2023-08-17 RX ORDER — GENTAMICIN SULFATE 40 MG/ML
INJECTION, SOLUTION INTRAMUSCULAR; INTRAVENOUS
Status: DISCONTINUED | OUTPATIENT
Start: 2023-08-17 | End: 2023-08-17 | Stop reason: HOSPADM

## 2023-08-17 RX ORDER — MAG HYDROX/ALUMINUM HYD/SIMETH 200-200-20
30 SUSPENSION, ORAL (FINAL DOSE FORM) ORAL EVERY 6 HOURS PRN
Status: DISCONTINUED | OUTPATIENT
Start: 2023-08-17 | End: 2023-08-18 | Stop reason: HOSPADM

## 2023-08-17 RX ORDER — TRANEXAMIC ACID 650 MG/1
1950 TABLET ORAL
Status: COMPLETED | OUTPATIENT
Start: 2023-08-17 | End: 2023-08-17

## 2023-08-17 RX ORDER — EPINEPHRINE 1 MG/ML
INJECTION, SOLUTION, CONCENTRATE INTRAVENOUS
Status: DISCONTINUED | OUTPATIENT
Start: 2023-08-17 | End: 2023-08-17 | Stop reason: HOSPADM

## 2023-08-17 RX ORDER — TALC
6 POWDER (GRAM) TOPICAL NIGHTLY PRN
Status: DISCONTINUED | OUTPATIENT
Start: 2023-08-17 | End: 2023-08-18 | Stop reason: HOSPADM

## 2023-08-17 RX ORDER — SCOLOPAMINE TRANSDERMAL SYSTEM 1 MG/1
1 PATCH, EXTENDED RELEASE TRANSDERMAL ONCE AS NEEDED
Status: DISCONTINUED | OUTPATIENT
Start: 2023-08-17 | End: 2023-08-17 | Stop reason: HOSPADM

## 2023-08-17 RX ORDER — SODIUM CHLORIDE 0.9 % (FLUSH) 0.9 %
SYRINGE (ML) INJECTION
Status: DISPENSED
Start: 2023-08-17 | End: 2023-08-17

## 2023-08-17 RX ORDER — EPHEDRINE SULFATE 50 MG/ML
INJECTION, SOLUTION INTRAVENOUS
Status: DISCONTINUED | OUTPATIENT
Start: 2023-08-17 | End: 2023-08-17

## 2023-08-17 RX ORDER — ROPIVACAINE HYDROCHLORIDE 5 MG/ML
INJECTION, SOLUTION EPIDURAL; INFILTRATION; PERINEURAL
Status: DISCONTINUED | OUTPATIENT
Start: 2023-08-17 | End: 2023-08-17 | Stop reason: HOSPADM

## 2023-08-17 RX ORDER — BUPIVACAINE HYDROCHLORIDE 7.5 MG/ML
INJECTION, SOLUTION EPIDURAL; RETROBULBAR
Status: COMPLETED | OUTPATIENT
Start: 2023-08-17 | End: 2023-08-17

## 2023-08-17 RX ORDER — MORPHINE SULFATE 10 MG/ML
INJECTION INTRAMUSCULAR; INTRAVENOUS; SUBCUTANEOUS
Status: DISPENSED
Start: 2023-08-17 | End: 2023-08-17

## 2023-08-17 RX ORDER — ROPIVACAINE HYDROCHLORIDE 5 MG/ML
INJECTION, SOLUTION EPIDURAL; INFILTRATION; PERINEURAL
Status: DISPENSED
Start: 2023-08-17 | End: 2023-08-17

## 2023-08-17 RX ORDER — KETOROLAC TROMETHAMINE 30 MG/ML
INJECTION, SOLUTION INTRAMUSCULAR; INTRAVENOUS
Status: DISCONTINUED | OUTPATIENT
Start: 2023-08-17 | End: 2023-08-17 | Stop reason: HOSPADM

## 2023-08-17 RX ADMIN — FAMOTIDINE 20 MG: 20 TABLET, FILM COATED ORAL at 08:08

## 2023-08-17 RX ADMIN — PRAMIPEXOLE DIHYDROCHLORIDE 1.5 MG: 1 TABLET ORAL at 12:08

## 2023-08-17 RX ADMIN — METOCLOPRAMIDE 10 MG: 5 INJECTION, SOLUTION INTRAMUSCULAR; INTRAVENOUS at 05:08

## 2023-08-17 RX ADMIN — BUPIVACAINE HYDROCHLORIDE 30 ML: 2.5 INJECTION, SOLUTION EPIDURAL; INFILTRATION; INTRACAUDAL; PERINEURAL at 10:08

## 2023-08-17 RX ADMIN — SODIUM CITRATE AND CITRIC ACID MONOHYDRATE 30 ML: 500; 334 SOLUTION ORAL at 06:08

## 2023-08-17 RX ADMIN — CEFAZOLIN 2 G: 2 INJECTION, POWDER, FOR SOLUTION INTRAMUSCULAR; INTRAVENOUS at 01:08

## 2023-08-17 RX ADMIN — METOCLOPRAMIDE 10 MG: 5 INJECTION, SOLUTION INTRAMUSCULAR; INTRAVENOUS at 12:08

## 2023-08-17 RX ADMIN — EPHEDRINE SULFATE 10 MG: 50 INJECTION INTRAVENOUS at 08:08

## 2023-08-17 RX ADMIN — EPHEDRINE SULFATE 10 MG: 50 INJECTION INTRAVENOUS at 09:08

## 2023-08-17 RX ADMIN — CEFAZOLIN 2 G: 2 INJECTION, POWDER, FOR SOLUTION INTRAMUSCULAR; INTRAVENOUS at 07:08

## 2023-08-17 RX ADMIN — SODIUM CHLORIDE: 9 INJECTION, SOLUTION INTRAVENOUS at 10:08

## 2023-08-17 RX ADMIN — BUPIVACAINE HYDROCHLORIDE 2 ML: 7.5 INJECTION, SOLUTION EPIDURAL; RETROBULBAR at 08:08

## 2023-08-17 RX ADMIN — KETOROLAC TROMETHAMINE 10 MG: 10 TABLET, FILM COATED ORAL at 06:08

## 2023-08-17 RX ADMIN — MIDAZOLAM 2 MG: 1 INJECTION INTRAMUSCULAR; INTRAVENOUS at 07:08

## 2023-08-17 RX ADMIN — HYDROCODONE BITARTRATE AND ACETAMINOPHEN 1 TABLET: 5; 325 TABLET ORAL at 10:08

## 2023-08-17 RX ADMIN — PHENYLEPHRINE HYDROCHLORIDE 100 MCG: 10 INJECTION INTRAVENOUS at 08:08

## 2023-08-17 RX ADMIN — SODIUM CHLORIDE, SODIUM GLUCONATE, SODIUM ACETATE, POTASSIUM CHLORIDE AND MAGNESIUM CHLORIDE: 526; 502; 368; 37; 30 INJECTION, SOLUTION INTRAVENOUS at 06:08

## 2023-08-17 RX ADMIN — KETOROLAC TROMETHAMINE 10 MG: 10 TABLET, FILM COATED ORAL at 01:08

## 2023-08-17 RX ADMIN — ACETAMINOPHEN 1000 MG: 500 TABLET ORAL at 06:08

## 2023-08-17 RX ADMIN — PROPOFOL 50 MCG/KG/MIN: 10 INJECTION, EMULSION INTRAVENOUS at 08:08

## 2023-08-17 RX ADMIN — SENNOSIDES AND DOCUSATE SODIUM 2 TABLET: 50; 8.6 TABLET ORAL at 08:08

## 2023-08-17 RX ADMIN — AMLODIPINE BESYLATE 5 MG: 5 TABLET ORAL at 08:08

## 2023-08-17 RX ADMIN — PRAMIPEXOLE DIHYDROCHLORIDE 1.5 MG: 1 TABLET ORAL at 08:08

## 2023-08-17 RX ADMIN — GABAPENTIN 300 MG: 300 CAPSULE ORAL at 06:08

## 2023-08-17 RX ADMIN — GABAPENTIN 300 MG: 300 CAPSULE ORAL at 08:08

## 2023-08-17 RX ADMIN — POLYETHYLENE GLYCOL 3350 17 G: 17 POWDER, FOR SOLUTION ORAL at 08:08

## 2023-08-17 RX ADMIN — PHENYLEPHRINE HYDROCHLORIDE 100 MCG: 10 INJECTION INTRAVENOUS at 09:08

## 2023-08-17 RX ADMIN — ACETAMINOPHEN 1000 MG: 10 INJECTION INTRAVENOUS at 03:08

## 2023-08-17 RX ADMIN — METHOCARBAMOL 750 MG: 750 TABLET ORAL at 03:08

## 2023-08-17 RX ADMIN — CEFAZOLIN 2 G: 2 INJECTION, POWDER, FOR SOLUTION INTRAMUSCULAR; INTRAVENOUS at 08:08

## 2023-08-17 RX ADMIN — TRANEXAMIC ACID 1950 MG: 650 TABLET ORAL at 06:08

## 2023-08-17 RX ADMIN — HYDROCODONE BITARTRATE AND ACETAMINOPHEN 0.5 TABLET: 5; 325 TABLET ORAL at 06:08

## 2023-08-17 RX ADMIN — SODIUM CHLORIDE, SODIUM GLUCONATE, SODIUM ACETATE, POTASSIUM CHLORIDE AND MAGNESIUM CHLORIDE: 526; 502; 368; 37; 30 INJECTION, SOLUTION INTRAVENOUS at 08:08

## 2023-08-17 NOTE — ANESTHESIA PROCEDURE NOTES
Spinal    Diagnosis: Primary osteoarthritis of right knee [M17.11]  Patient location during procedure: OR  Start time: 8/17/2023 8:05 AM  Timeout: 8/17/2023 7:58 AM  End time: 8/17/2023 8:10 AM    Staffing  Authorizing Provider: Willian Alicea MD  Performing Provider: Willian Alicea MD    Staffing  Performed by: Willian Alicea MD  Authorized by: Willian Alicea MD    Preanesthetic Checklist  Completed: patient identified, IV checked, site marked, risks and benefits discussed, surgical consent, monitors and equipment checked, pre-op evaluation and timeout performed  Spinal Block  Patient position: sitting  Prep: ChloraPrep  Patient monitoring: heart rate, cardiac monitor, continuous pulse ox and frequent blood pressure checks  Approach: midline  Location: L3-4  Injection technique: single shot  CSF Fluid: clear free-flowing CSF  Needle  Needle type: pencil-tip   Needle gauge: 25 G  Needle length: 3.5 in  Additional Documentation: incremental injection, negative aspiration for heme and no paresthesia on injection  Needle localization: anatomical landmarks  Assessment  Sensory level: T5   Dermatomal levels determined by alcohol wipe  Ease of block: easy  Patient's tolerance of the procedure: comfortable throughout block and no complaints  Additional Notes  Sitting position, usual prep & drape, local to skin & subq 25 ga 1% xylocaine (3 ml)  Introducer in, 25 ga Pencan needle, clear csf on 2nd pass, no paresthesia, dosed, 2 ml 0.75% Bupivacaine (in 8.25% dextrose) intrathecal, needle out, to supine  Medications:    Medications: bupivacaine (pf) (MARCAINE) injection 0.75% - Intraspinal   2 mL - 8/17/2023 8:10:00 AM

## 2023-08-17 NOTE — PLAN OF CARE
Problem: Physical Therapy  Goal: Physical Therapy Goal  Description: Pt will improve functional independence by performing:    Bed mobility: SBA  Sit to stand: SBA  Car Transfer: Min A  with rolling walker  Ambulation x 150' feet with SBA and rolling walker  1 Step (Curb): Min A  and rolling walker  3 Steps: Min A  and L HR  right knee AROM flexion (in degrees): 80  right knee AROM extension (in degrees): 0   Independent with total knee HEP    Outcome: Initial

## 2023-08-17 NOTE — ANESTHESIA PREPROCEDURE EVALUATION
08/16/2023  Amita Trejo is a 72 y.o., female.    Amita Trejo    Pre-op Diagnosis: Primary osteoarthritis of right knee [M17.11]    Procedure(s):  ROBOTIC ARTHROPLASTY, KNEE, TOTAL     Review of patient's allergies indicates:   Allergen Reactions    Duloxetine      Other reaction(s): Bradycardia, Hypotension, Vomit    Lorazepam      Other reaction(s): Confusion  amnesia      Tramadol Other (See Comments)     nightmares       Current Outpatient Medications   Medication Instructions    amLODIPine (NORVASC) 5 mg, Oral, Nightly    B-complex with vitamin C (Z-BEC OR EQUIV) tablet 1 tablet, Oral, Daily    cholecalciferol (vitamin D3) 5,000 Units, Oral, Daily    lisdexamfetamine (VYVANSE) 70 mg, Oral, Every morning    metoprolol tartrate (LOPRESSOR) 75 mg, Oral, Daily    pantoprazole (PROTONIX) 40 mg, Daily    polysaccharide iron complex 200 mg iron Cap 1 capsule, Oral, Daily    pramipexole (MIRAPEX) 1.5 mg, Oral, 2 times daily    telmisartan-hydrochlorothiazide (MICARDIS HCT) 40-12.5 mg per tablet 1 tablet, Oral, Daily    vitamin B complex (B COMPLEX 1 ORAL) 1 tablet, Oral, Daily       AK TOTAL KNEE ARTHROPLASTY [[id number]] (ROBOTIC ARTHROPLASTY, K*    Past Medical History:   Diagnosis Date    Acid reflux     Arthritis     chronic lymphocytic leukemia     Hypertension     Renal cyst     Sleep apnea        Past Surgical History:   Procedure Laterality Date    BREAST SURGERY Left 1991?    lumpectomy    CARPAL TUNNEL RELEASE      CYST REMOVAL      EPIDURAL STEROID INJECTION      JOINT REPLACEMENT  07/21    Left knee    KNEE JOINT MANIPULATION Left 08/29/2022    Procedure: MANIPULATION, KNEE;  Surgeon: Sohan Bowens MD;  Location: [facility];  Service: Orthopedics;  Laterality: Left;    REVISION OF KNEE ARTHROPLASTY Left 06/08/2022    Procedure: REVISION, ARTHROPLASTY, KNEE;  Surgeon: Sohan BLAKE  MD Gogo;  Location: [facility] OR;  Service: Orthopedics;  Laterality: Left;  FEMUR / PATHOLOGY / ASHLEY    SHOULDER SURGERY Right     SINUS SURGERY      x 2    SPINE SURGERY  05/21    Bulging disc in lower back    THYROID SURGERY      TONSILLECTOMY       Lab Results   Component Value Date    WBC 16.42 (H) 07/28/2023    HGB 12.3 07/28/2023    HCT 37.6 07/28/2023    MCV 96.4 (H) 07/28/2023     07/28/2023   BMP  Lab Results   Component Value Date     07/28/2023    K 4.1 07/28/2023     04/06/2021    CO2 27 07/28/2023    BUN 36.4 (H) 07/28/2023    CREATININE 0.91 07/28/2023    CALCIUM 10.1 07/28/2023    ANIONGAP 8 04/06/2021    ESTGFRAFRICA 93 04/06/2021    EGFRNONAA >60 06/10/2022        ECG - NSR no ST T changes    CT angiogram of the chest     Indication:  Dyspnea     Comparison:  None available     Findings: Multiple contiguous axial CT images were obtained of the chest with IV contrast during the pulmonary arterial phase.  Approximally 100 ml of Omnipaque 350 intravenous contrast was utilized for the exam.  Axial, coronal and sagittal MIPs of the pulmonary arterial vasculature were performed on a separate workstation and submitted for evaluation.     Pulmonary arteries:  No filling defects are demonstrated within the pulmonary arteries.     The heart is normal in size. Esophagus is distended, presumably related to gastroesophageal reflux. No mediastinal mass lesion or adenopathy. Small volume bilateral pleural fluid collections. Associated passive atelectasis. There is smooth interlobular septal thickening and sparse groundglass and patchy bilateral pulmonary opacities characteristic of pulmonary edema. No chest wall mass lesion. Reactive bilateral axillary lymph nodes are symmetric. Visualized abdominal contents reveal no acute or concerning pathology. Mild body wall anasarca. Benign superior pole water density right renal cyst. Review the scanned osseous anatomy shows no acute or  aggressive lesion. Multilevel spondylosis is noted.     Impression:  No evidence of acute pulmonary thromboembolic disease. Pulmonary edema and small volume effusions. Correlate for cardiac decompensation or volume overload. Evidence of gastroesophageal reflux.     All CTs performed at this institution utilize dose modulation and/or weight-based dose reduction when appropriate to reduce radiation dose to the patient as low as reasonably achievable.  Exam End: 03/31/21 02:03     TTE 6/2/2022    Pre-op Assessment    I have reviewed the Patient Summary Reports.    I have reviewed the NPO Status.   I have reviewed the Medications.     Review of Systems  Anesthesia Hx:  No problems with previous Anesthesia  Denies Family Hx of Anesthesia complications.   Denies Personal Hx of Anesthesia complications.   Social:  Non-Smoker    Hematology/Oncology:        Oncology Comments: CLL   Cardiovascular:   Exercise tolerance: poor Hypertension  Denies Angina.  Denies Orthopnea.  Denies PND.  Denies ESCALONA.  Functional Capacity low / < 4 METS    Pulmonary:   Sleep Apnea    Renal/:   Chronic Renal Disease (RENAL CYST)    Hepatic/GI:   GERD    Musculoskeletal:   Arthritis     Neurological:   Denies TIA. Denies CVA.    Psych:  Psychiatric Normal           Physical Exam  General: Well nourished, Alert and Oriented    Airway:  Mallampati: III   Mouth Opening: Normal  TM Distance: Normal  Tongue: Normal  Neck ROM: Normal ROM    Dental:  Intact    Chest/Lungs:  Clear to auscultation    Heart:  Rate: Normal  Rhythm: Regular Rhythm  No pretibial edema  No carotid bruits      Anesthesia Plan  Type of Anesthesia, risks & benefits discussed:    Anesthesia Type: Spinal  Intra-op Monitoring Plan: Standard ASA Monitors  Post Op Pain Control Plan: multimodal analgesia  Induction:  IV  Airway Plan: Direct  Informed Consent: Informed consent signed with the Patient and all parties understand the risks and agree with anesthesia plan.  All questions  answered. Patient consented to blood products? Yes  ASA Score: 3  Day of Surgery Review of History & Physical: H&P Update referred to the surgeon/provider.  Anesthesia Plan Notes: Risks post spinal headache, backache, high spinal, sensory / motor neuropathy, & failed spinal with possible General Anesthesia (GETA) explained & patient accepts   Adductor Canal Block for Postop Pain Relief at surgeon's request (to be performed postop)  Risks including sensory & motor neuropathy, failed block, & seizure    Ready For Surgery From Anesthesia Perspective.     .

## 2023-08-17 NOTE — TRANSFER OF CARE
Anesthesia Transfer of Care Note    Patient: Amita Trejo    Procedure(s) Performed: Procedure(s) (LRB):  ROBOTIC ARTHROPLASTY, KNEE, TOTAL (Right)    Patient location: PACU    Anesthesia Type: spinal    Transport from OR: Transported from OR on room air with adequate spontaneous ventilation    Post pain: adequate analgesia    Post assessment: no apparent anesthetic complications    Post vital signs: stable    Level of consciousness: awake and alert    Complications: none    Transfer of care protocol was followed      Last vitals:   Visit Vitals  BP (!) 145/71 (BP Location: Left arm, Patient Position: Lying)   Pulse 73   Temp 36.3 °C (97.4 °F)   Resp 20   Ht 5' (1.524 m)   Wt 75.8 kg (167 lb)   SpO2 99%   BMI 32.61 kg/m²

## 2023-08-17 NOTE — OP NOTE
DATE OF PROCEDURE: 08/17/2023      PREOPERATIVE DIAGNOSIS:  Arthritis, right knee.     POSTOPERATIVE DIAGNOSIS:  Arthritis, right knee.     PROCEDURES PERFORMED:  Robotically-assisted right total knee arthroplasty.     SURGEON:  Josh Barone M.D.     ASSISTANT: First assistant:Gustabo Rosenthal, certified physician assistant, who was necessary and essential for all aspects of the operation, including but no limited to patient positioning, surgical exposure, bony preparation, implantation, wound closure, and dressing placement.       ANESTHESIA: spinal     COMPLICATIONS:  None.     COUNTS:  Correct.     DISPOSITION:  Recovery Room, stable.     SPECIMENS:  Bone and cartilage.     FINDINGS:  Arthritis.      ESTIMATED BLOOD LOSS:  <25ml     IMPLANTS:  Bala Triathlon size 2 right  Triathlon cruciate retaining femoral component and a size 2 primary tibial baseplate, and a size 2, 9 mm   CR tibial insert.     INDICATIONS FOR PROCEDURE:    Amita Trejo is a 72 y.o. year old female    who is   having symptoms of right knee pain.  Physical examination and imaging studies   were consistent with arthritis.Treatment options were explained and it was decided   to proceed with robotically-assisted right total knee arthroplasty.  She was   aware of reasonable treatment options as well as risks and benefits.       PROCEDURE IN DETAIL:  After appropriate consent was obtained, the patient was  brought to the Operating Room, anesthesia was administered.  She received   antibiotic prophylaxis.  A tourniquet was applied to the proximal  right thigh.  right lower extremity was then prepped and draped in usual sterile   fashion.  The leg rodriguez was applied.  Timeout was called.  Limb was elevated   and tourniquet was inflated.  The knee was flexed.  An incision was made from   the tibial tubercle just proximal to the superior pole of the patella.  It was   taken down through the skin and retinaculum.  Skin bleeders were coagulated  with cautery. A medial parapatellar arthrotomy   was performed.   Following this, the anterior cruciate ligament was resected, fat pad   was excised, and medial and lateral meniscal remnants were excised.  Pins were placed in the femur and tibia, followed by assembly and registration of the femoral and tibial arrays.  Hip center and ankle center registration was performed. Femoral and tibial checkpoints were placed and registered. Fine point registration of the femur and tibia was performed, followed by excision of osteophytes and ligament balancing.  When the plan was balanced symmetrically throughout a range of motion, robotic assisted size 2 femoral and size 2 tibial cuts were made.  Posterior oseophytes and loose bodies were excised. A size 2 tibial trial was placed and pinned posteromedially to allow for rotational adjustment and appropriate patella tracking prior to final positional pinning. I then placed a 9 mm trial tibial bearing insert along with a size 2 femoral trial.  The knee was brought into full extension and the preoperative varus deformity was corrected appropriately, relative to the mechanical axis.   Intra-operative motion was 0 degrees to 120 degrees, with excellent medial and lateral stability in mid and deep flexion as well as extension. The patella tracked appropriately and the final tibial rotation was pinned in position.  There was symmetric ligament balancing throughout the range of motion.  The femur and tibial bone preparation was then made for implantation.  Trials were removed and bone surfaces were irrigated, suctioned, and prepared.  I then implanted a size 2 tibial component, followed by pressfit of a 9 mm polyethylene bearing. The size 2 femur was then implanted. There was excellent fixation of all components. Range of motion was 0 degrees to 120 degrees with symmetric ligament balancing and appropriate patella tracking. The knee was irrigated, suctioned, and injected for  postoperative pain control. The arthrotomy was then closed with #1 braided suture, followed by reapproximation of skin edges with 2-0 vicryl suture. Surgical staples were used for skin closure. Sterile dressings were applied. The patient was then taken to recovery room in stable condition.

## 2023-08-17 NOTE — PT/OT/SLP EVAL
"Physical Therapy Evaluation    Patient Name:  Amita Trejo   MRN:  [id number]    Recommendations:     Discharge Recommendations: home, outpatient PT   Discharge Equipment Recommendations: none   Barriers to discharge: None    Assessment:     Amita Trejo is a 72 y.o. female admitted with a medical diagnosis of Primary osteoarthritis of right knee.  She presents with the following impairments/functional limitations: weakness, impaired functional mobility, gait instability, decreased lower extremity function, pain, decreased ROM, edema, orthopedic precautions.    Rehab Prognosis: Good; patient would benefit from acute skilled PT services to address these deficits and reach maximum level of function.    Recent Surgery: Procedure(s) (LRB):  ROBOTIC ARTHROPLASTY, KNEE, TOTAL (Right) Day of Surgery    Plan:     During this hospitalization, patient to be seen BID to address the identified rehab impairments via gait training, therapeutic activities, therapeutic exercises and progress toward the following goals:    Plan of Care Expires:  08/23/23    Subjective     Chief Complaint: R knee pain, L hip pain  Patient/Family Comments/goals: "to walk"  Pain/Comfort:  Location - Side 1: Right  Location 1: knee  Pain Addressed 1: Pre-medicate for activity, Reposition, Distraction, Cessation of Activity    Patients cultural, spiritual, Shinto conflicts given the current situation: no    Living Environment:  Pt lives with her spouse in a single story home with 3 steps and a L rail to enter. Pt has a PMHx of a L TKA with manipulation and revision, resulting in a stiff L knee. The patient reports she also plans to get her L hip replaced soon.   Prior to admission, patients level of function was Mod I - pt was ambulating with a RW.  Equipment used at home: walker, rolling.  DME owned (not currently used): none.  Upon discharge, patient will have assistance from her spouse and family.    Prehab: Pt reports that she has been in " therapy for her L knee for 6 months, and that she performed exercises prior to sx.   Objective:     Communicated with MOUNIKA Sepulveda prior to session.  Patient found supine with peripheral IV  upon PT entry to room.    General Precautions: Standard, fall  Orthopedic Precautions:RLE weight bearing as tolerated   Braces: N/A  Respiratory Status: Room air    Exams:  RLE ROM:      (In degrees) AROM PROM   R knee flexion 90 97   R knee extension 0      RLE Strength: NT d/t sx side  LLE ROM: Stiff, but WFL  LLE Strength: WFL    Functional Mobility:  Bed Mobility:     Supine to Sit: contact guard assistance  Transfers:     Sit to Stand:  minimum assistance with rolling walker  Toilet Transfer: minimum assistance with  rolling walker  using  Step Transfer, attempted to void but pt unsuccessful. Pt reporting numbness.   Gait: 200ft with RW, CGA, step-through pattern.       Treatment & Education:  Pt educated on TKA protocol and PT POC    Patient left up in chair with all lines intact, call button in reach, NSG notified, and family present.    GOALS:   Multidisciplinary Problems       Physical Therapy Goals          Problem: Physical Therapy    Goal Priority Disciplines Outcome Goal Variances Interventions   Physical Therapy Goal     PT, PT/OT Ongoing, Progressing     Description: Pt will improve functional independence by performing:    Bed mobility: SBA  Sit to stand: SBA  Car Transfer: Min A  with rolling walker  Ambulation x 150' feet with SBA and rolling walker  1 Step (Curb): Min A  and rolling walker  3 Steps: Min A  and L HR  right knee AROM flexion (in degrees): 80  right knee AROM extension (in degrees): 0   Independent with total knee HEP                         History:     Past Medical History:   Diagnosis Date    Acid reflux     Arthritis     chronic lymphocytic leukemia     Hypertension     Renal cyst     Sleep apnea        Past Surgical History:   Procedure Laterality Date    BREAST SURGERY Left 1991?     lumpectomy, NO Restrictions    CARPAL TUNNEL RELEASE      CYST REMOVAL      EPIDURAL STEROID INJECTION      JOINT REPLACEMENT  07/21    Left knee    KNEE JOINT MANIPULATION Left 08/29/2022    Procedure: MANIPULATION, KNEE;  Surgeon: Sohan Bowens MD;  Location: [facility] OR;  Service: Orthopedics;  Laterality: Left;    REVISION OF KNEE ARTHROPLASTY Left 06/08/2022    Procedure: REVISION, ARTHROPLASTY, KNEE;  Surgeon: Sohan Bowens MD;  Location: [facility] OR;  Service: Orthopedics;  Laterality: Left;  FEMUR / PATHOLOGY / ASHLEY    SHOULDER SURGERY Right     SINUS SURGERY      x 2    SPINE SURGERY  05/21    Bulging disc in lower back    THYROID SURGERY      TONSILLECTOMY         Time Tracking:     PT Received On:    PT Start Time: 1310     PT Stop Time: 1336  PT Total Time (min): 26 min     Billable Minutes: Evaluation 18 and Therapeutic Activity 8      08/17/2023

## 2023-08-17 NOTE — ANESTHESIA PROCEDURE NOTES
Peripheral Block    Patient location during procedure: holding area   Block not for primary anesthetic.  Reason for block: at surgeon's request and post-op pain management   Post-op Pain Location: RIGHT KNEE PAIN   Start time: 8/17/2023 10:14 AM  Timeout: 8/17/2023 10:14 AM   End time: 8/17/2023 10:16 AM    Staffing  Authorizing Provider: Willian Alicea MD  Performing Provider: Willian Alicea MD    Staffing  Performed by: Willian Alicea MD  Authorized by: Willian Alicea MD    Preanesthetic Checklist  Completed: patient identified, IV checked, site marked, risks and benefits discussed, surgical consent, monitors and equipment checked, pre-op evaluation and timeout performed  Peripheral Block  Patient position: supine  Prep: ChloraPrep  Patient monitoring: heart rate, continuous pulse ox and frequent blood pressure checks  Block type: adductor canal and saphenous  Laterality: right  Injection technique: single shot  Needle  Needle type: Stimuplex   Needle gauge: 20 G  Needle length: 4 in  Needle localization: ultrasound guidance and nerve stimulator   -ultrasound image captured on disc.  Assessment  Injection assessment: negative aspiration, negative parasthesia and local visualized surrounding nerve  Paresthesia pain: none  Heart rate change: no  Slow fractionated injection: yes  Pain Tolerance: comfortable throughout block  Medications:    Medications: bupivacaine (pf) (MARCAINE) injection 0.25% - Perineural   30 mL - 8/17/2023 10:16:00 AM    Additional Notes  Block requested by Dr. Barone on surgery schedule / in his preop orders (anesthesia to evaluate for block for postoperative pain relief)    Site Prepped: RightThigh   Prep:  Chloraprep allowed to dry completely as to  guidelines  Local to Skin & Subq: 1.0 ml with 27 ga 1.5 inch needle     BLOCK NEEDLE:  NATH STIMU [id number] (20 ga 4 inches)    Ultrasound was utilized to visualize the nerve bundle or sheath, as well as, to confirm  placement of the needle and deposition of the local anesthetic    Local Anesthetic: 0.25% BUPIVACAINE MPF    DOSING:  incremental dosing (1+4+1+4+5+5+5+5= 30 ml) with aspiration between each incremental dose                    at Saphenous N in adductor canal  Pressure held over injection site X 1 min  Complications noted:  none apparent.    Narrative  Under ultrasound guidance a NATH [id number] stimulating needle was inserted & placed in close proximity to the Saphenous N in the Right Adductor Canal  Injections were made in close proximity  to the R  saphenous Nerve  Ultrasound was used to visualize the spread of the anesthetic around the nerve    The nerve appeared anatomically  normal.    There were no apparent pathological findings

## 2023-08-17 NOTE — ANESTHESIA PROCEDURE NOTES
Peripheral Block    Patient location during procedure: holding area   Block not for primary anesthetic.  Reason for block: at surgeon's request and post-op pain management   Post-op Pain Location: LEFT KNEE PAIN   Start time: 8/17/2023 8:50 AM  Timeout: 8/17/2023 8:50 AM   End time: 8/17/2023 8:51 AM    Staffing  Authorizing Provider: Willian Alicea MD  Performing Provider: Willian Alicea MD    Staffing  Performed by: Willian Alicea MD  Authorized by: Willian Alicea MD    Preanesthetic Checklist  Completed: patient identified, IV checked, site marked, risks and benefits discussed, surgical consent, monitors and equipment checked, pre-op evaluation and timeout performed  Peripheral Block  Patient position: supine  Prep: ChloraPrep  Patient monitoring: heart rate, continuous pulse ox and frequent blood pressure checks  Block type: adductor canal  Laterality: left  Injection technique: single shot  Needle  Needle type: Stimuplex   Needle gauge: 20 G  Needle length: 4 in  Needle localization: ultrasound guidance and nerve stimulator   -ultrasound image captured on disc.  Assessment  Injection assessment: negative aspiration, negative parasthesia and local visualized surrounding nerve  Paresthesia pain: none  Heart rate change: no  Slow fractionated injection: yes  Pain Tolerance: comfortable throughout block  Medications:    Medications: bupivacaine (pf) (MARCAINE) injection 0.25% - Perineural   30 mL - 8/17/2023 8:51:00 AM    Additional Notes  Block requested by Dr. Barone on surgery schedule in his preop orders (anesthesia to evaluate for block for postoperative pain relief)    Site Prepped: Left Thigh   Prep:  Chloraprep allowed to dry completely as to  guidelines  Local to Skin & Subq: 1.0 ml with 27 ga 1.5 inch needle     BLOCK NEEDLE:  NATH STIMUPLEX A  (20 ga 4 inches)    Ultrasound was utilized to visualize the nerve bundle or sheath, as well as, to confirm placement of the  needle and deposition of the local anesthetic    Local Anesthetic: 0.25% Bupivacaine mpf  DOSING:  incremental dosing (1+4+1+4+5+5+5+5= 30 ml) with aspiration between each incremental dose                    at Saphenous N in adductor canal  Pressure held over injection site X 1 min  Complications noted:  none apparent.    Narrative  Under ultrasound guidance a NATH [id number] stimulating needle was inserted & placed in close proximity to the Saphenous N in the Left Adductor Canal  Injections were made in close proximity  to the L saphenous Nerve  Ultrasound was used to visualize the spread of the anesthetic around the nerve    The nerve appeared anatomically  normal.    There were no apparent pathological findings

## 2023-08-17 NOTE — NURSING
Nurses Note -- 4 Eyes      8/17/2023   3:13 PM      Skin assessed during: Admit      [x] No Altered Skin Integrity Present    []Prevention Measures Documented      [] Yes- Altered Skin Integrity Present or Discovered   [] LDA Added if Not in Epic (Describe Wound)   [] New Altered Skin Integrity was Present on Admit and Documented in LDA   [] Wound Image Taken    Wound Care Consulted? No    Attending Nurse:  Maryann Grady RN/Staff Member:   LORI Wolf

## 2023-08-17 NOTE — ANESTHESIA POSTPROCEDURE EVALUATION
Anesthesia Post Evaluation    Patient: Amita Trejo    Procedure(s) Performed: Procedure(s) (LRB):  ROBOTIC ARTHROPLASTY, KNEE, TOTAL (Right)    Final Anesthesia Type: spinal      Patient location during evaluation: PACU  Patient participation: Yes- Able to Participate  Level of consciousness: awake and alert and oriented  Post-procedure vital signs: reviewed and stable  Pain management: adequate  Airway patency: patent  ELI mitigation strategies: Use of major conduction anesthesia (spinal/epidural) or peripheral nerve block and Multimodal analgesia  PONV status at discharge: No PONV  Anesthetic complications: no    Monica-operative Events Comments:   RESOLVING NEURAXIAL ANESTHESIA; STABLE PERIPHERAL BLOCKADE  Cardiovascular status: hemodynamically stable  Respiratory status: unassisted  Hydration status: euvolemic  Follow-up not needed.  Comments: Denies problem with spinal anesthesia          Vitals Value Taken Time   /76 08/17/23 1121   Temp 36.4 °C (97.5 °F) 08/17/23 1052   Pulse 66 08/17/23 1123   Resp 12 08/17/23 1047   SpO2 100 % 08/17/23 1123   Vitals shown include unvalidated device data.      Event Time   Out of Recovery 10:46:25         Pain/Luan Score: Pain Rating Prior to Med Admin: 2 (8/17/2023  6:49 AM)  Luan Score: 9 (8/17/2023 10:41 AM)

## 2023-08-17 NOTE — PLAN OF CARE
Problem: Adult Inpatient Plan of Care  Goal: Plan of Care Review  Outcome: Ongoing, Progressing  Goal: Patient-Specific Goal (Individualized)  Outcome: Ongoing, Progressing  Goal: Absence of Hospital-Acquired Illness or Injury  Outcome: Ongoing, Progressing  Goal: Optimal Comfort and Wellbeing  Outcome: Ongoing, Progressing  Goal: Readiness for Transition of Care  Outcome: Ongoing, Progressing     Problem: Diabetes Comorbidity  Goal: Blood Glucose Level Within Targeted Range  Outcome: Ongoing, Progressing     Problem: Infection  Goal: Absence of Infection Signs and Symptoms  Outcome: Ongoing, Progressing     Problem: Skin Injury Risk Increased  Goal: Skin Health and Integrity  Outcome: Ongoing, Progressing     Problem: Adjustment to Surgery (Knee Arthroplasty)  Goal: Optimal Coping  Outcome: Ongoing, Progressing     Problem: Bleeding (Knee Arthroplasty)  Goal: Absence of Bleeding  Outcome: Ongoing, Progressing     Problem: Bowel Motility Impaired (Knee Arthroplasty)  Goal: Effective Bowel Elimination  Outcome: Ongoing, Progressing     Problem: Fluid and Electrolyte Imbalance (Knee Arthroplasty)  Goal: Fluid and Electrolyte Balance  Outcome: Ongoing, Progressing     Problem: Functional Ability Impaired (Knee Arthroplasty)  Goal: Optimal Functional Ability  Outcome: Ongoing, Progressing     Problem: Infection (Knee Arthroplasty)  Goal: Absence of Infection Signs and Symptoms  Outcome: Ongoing, Progressing     Problem: Neurovascular Compromise (Knee Arthroplasty)  Goal: Intact Neurovascular Status  Outcome: Ongoing, Progressing     Problem: Ongoing Anesthesia Effects (Knee Arthroplasty)  Goal: Anesthesia/Sedation Recovery  Outcome: Ongoing, Progressing     Problem: Pain (Knee Arthroplasty)  Goal: Acceptable Pain Control  Outcome: Ongoing, Progressing     Problem: Postoperative Nausea and Vomiting (Knee Arthroplasty)  Goal: Nausea and Vomiting Relief  Outcome: Ongoing, Progressing     Problem: Postoperative Urinary  Retention (Knee Arthroplasty)  Goal: Effective Urinary Elimination  Outcome: Ongoing, Progressing     Problem: Respiratory Compromise (Knee Arthroplasty)  Goal: Effective Oxygenation and Ventilation  Outcome: Ongoing, Progressing     Problem: Obstructive Sleep Apnea Risk or Actual  Goal: Unobstructed Breathing During Sleep  Outcome: Ongoing, Progressing     Problem: Hypertension Comorbidity  Goal: Blood Pressure in Desired Range  Outcome: Ongoing, Progressing

## 2023-08-18 VITALS
RESPIRATION RATE: 18 BRPM | TEMPERATURE: 97 F | WEIGHT: 167 LBS | SYSTOLIC BLOOD PRESSURE: 114 MMHG | OXYGEN SATURATION: 97 % | HEIGHT: 60 IN | HEART RATE: 76 BPM | DIASTOLIC BLOOD PRESSURE: 66 MMHG | BODY MASS INDEX: 32.79 KG/M2

## 2023-08-18 LAB
ANION GAP SERPL CALC-SCNC: 9 MEQ/L
BUN SERPL-MCNC: 31 MG/DL (ref 9.8–20.1)
CALCIUM SERPL-MCNC: 8 MG/DL (ref 8.4–10.2)
CHLORIDE SERPL-SCNC: 105 MMOL/L (ref 98–107)
CO2 SERPL-SCNC: 24 MMOL/L (ref 23–31)
CREAT SERPL-MCNC: 0.87 MG/DL (ref 0.55–1.02)
CREAT/UREA NIT SERPL: 36
ERYTHROCYTE [DISTWIDTH] IN BLOOD BY AUTOMATED COUNT: 12.6 % (ref 11.5–17)
GFR SERPLBLD CREATININE-BSD FMLA CKD-EPI: >60 MLS/MIN/1.73/M2
GLUCOSE SERPL-MCNC: 101 MG/DL (ref 82–115)
HCT VFR BLD AUTO: 25.9 % (ref 37–47)
HGB BLD-MCNC: 8.6 G/DL (ref 12–16)
MCH RBC QN AUTO: 31.7 PG (ref 27–31)
MCHC RBC AUTO-ENTMCNC: 33.2 G/DL (ref 33–36)
MCV RBC AUTO: 95.6 FL (ref 80–94)
NRBC BLD AUTO-RTO: 0 %
PLATELET # BLD AUTO: 128 X10(3)/MCL (ref 130–400)
PMV BLD AUTO: 9.7 FL (ref 7.4–10.4)
POTASSIUM SERPL-SCNC: 3.9 MMOL/L (ref 3.5–5.1)
RBC # BLD AUTO: 2.71 X10(6)/MCL (ref 4.2–5.4)
SODIUM SERPL-SCNC: 138 MMOL/L (ref 136–145)
WBC # SPEC AUTO: 7.39 X10(3)/MCL (ref 4.5–11.5)

## 2023-08-18 PROCEDURE — 97530 THERAPEUTIC ACTIVITIES: CPT

## 2023-08-18 PROCEDURE — G0378 HOSPITAL OBSERVATION PER HR: HCPCS

## 2023-08-18 PROCEDURE — 63600175 PHARM REV CODE 636 W HCPCS: Performed by: SPECIALIST

## 2023-08-18 PROCEDURE — 85027 COMPLETE CBC AUTOMATED: CPT | Performed by: SPECIALIST

## 2023-08-18 PROCEDURE — 97110 THERAPEUTIC EXERCISES: CPT

## 2023-08-18 PROCEDURE — 80048 BASIC METABOLIC PNL TOTAL CA: CPT | Performed by: SPECIALIST

## 2023-08-18 PROCEDURE — 25000003 PHARM REV CODE 250: Performed by: NURSE PRACTITIONER

## 2023-08-18 PROCEDURE — 25000003 PHARM REV CODE 250: Performed by: SPECIALIST

## 2023-08-18 RX ORDER — HYDROCODONE BITARTRATE AND ACETAMINOPHEN 5; 325 MG/1; MG/1
1 TABLET ORAL EVERY 4 HOURS PRN
Qty: 42 TABLET | Refills: 0 | Status: SHIPPED | OUTPATIENT
Start: 2023-08-18 | End: 2023-08-25

## 2023-08-18 RX ORDER — KETOROLAC TROMETHAMINE 10 MG/1
10 TABLET, FILM COATED ORAL EVERY 8 HOURS
Qty: 15 TABLET | Refills: 0 | Status: SHIPPED | OUTPATIENT
Start: 2023-08-18 | End: 2023-08-23

## 2023-08-18 RX ORDER — POLYETHYLENE GLYCOL 3350 17 G/17G
17 POWDER, FOR SOLUTION ORAL DAILY
Qty: 14 EACH | Refills: 0 | Status: SHIPPED | OUTPATIENT
Start: 2023-08-18 | End: 2023-09-01

## 2023-08-18 RX ORDER — ASPIRIN 81 MG/1
81 TABLET ORAL EVERY 12 HOURS
Qty: 84 TABLET | Refills: 0 | Status: ON HOLD | OUTPATIENT
Start: 2023-08-18 | End: 2024-01-04

## 2023-08-18 RX ORDER — METHOCARBAMOL 750 MG/1
750 TABLET, FILM COATED ORAL EVERY 6 HOURS
Qty: 56 TABLET | Refills: 0 | Status: SHIPPED | OUTPATIENT
Start: 2023-08-18 | End: 2023-09-01

## 2023-08-18 RX ADMIN — METOCLOPRAMIDE 10 MG: 5 INJECTION, SOLUTION INTRAMUSCULAR; INTRAVENOUS at 01:08

## 2023-08-18 RX ADMIN — KETOROLAC TROMETHAMINE 10 MG: 10 TABLET, FILM COATED ORAL at 12:08

## 2023-08-18 RX ADMIN — HYDROCODONE BITARTRATE AND ACETAMINOPHEN 1 TABLET: 5; 325 TABLET ORAL at 03:08

## 2023-08-18 RX ADMIN — CEFAZOLIN 2 G: 2 INJECTION, POWDER, FOR SOLUTION INTRAMUSCULAR; INTRAVENOUS at 02:08

## 2023-08-18 RX ADMIN — FAMOTIDINE 20 MG: 20 TABLET, FILM COATED ORAL at 09:08

## 2023-08-18 RX ADMIN — KETOROLAC TROMETHAMINE 10 MG: 10 TABLET, FILM COATED ORAL at 05:08

## 2023-08-18 RX ADMIN — PRAMIPEXOLE DIHYDROCHLORIDE 1.5 MG: 1 TABLET ORAL at 09:08

## 2023-08-18 RX ADMIN — METOCLOPRAMIDE 10 MG: 5 INJECTION, SOLUTION INTRAMUSCULAR; INTRAVENOUS at 05:08

## 2023-08-18 RX ADMIN — HYDROCODONE BITARTRATE AND ACETAMINOPHEN 1 TABLET: 5; 325 TABLET ORAL at 07:08

## 2023-08-18 RX ADMIN — METHOCARBAMOL 750 MG: 750 TABLET ORAL at 05:08

## 2023-08-18 RX ADMIN — METOCLOPRAMIDE 10 MG: 5 INJECTION, SOLUTION INTRAMUSCULAR; INTRAVENOUS at 12:08

## 2023-08-18 RX ADMIN — SENNOSIDES AND DOCUSATE SODIUM 2 TABLET: 50; 8.6 TABLET ORAL at 09:08

## 2023-08-18 RX ADMIN — ASPIRIN 81 MG 81 MG: 81 TABLET ORAL at 09:08

## 2023-08-18 RX ADMIN — HYDROCODONE BITARTRATE AND ACETAMINOPHEN 1 TABLET: 5; 325 TABLET ORAL at 10:08

## 2023-08-18 RX ADMIN — METHOCARBAMOL 750 MG: 750 TABLET ORAL at 01:08

## 2023-08-18 RX ADMIN — METOPROLOL TARTRATE 75 MG: 50 TABLET, FILM COATED ORAL at 09:08

## 2023-08-18 RX ADMIN — DOCUSATE SODIUM 200 MG: 100 CAPSULE, LIQUID FILLED ORAL at 05:08

## 2023-08-18 RX ADMIN — LOSARTAN POTASSIUM 50 MG: 50 TABLET, FILM COATED ORAL at 09:08

## 2023-08-18 RX ADMIN — KETOROLAC TROMETHAMINE 10 MG: 10 TABLET, FILM COATED ORAL at 01:08

## 2023-08-18 RX ADMIN — HYDROCHLOROTHIAZIDE 12.5 MG: 12.5 TABLET ORAL at 09:08

## 2023-08-18 NOTE — NURSING
Perception of Care - Joint Replacement Population Total Joint Surgery List: KNEE    Patient able to verbalize one way to treat SWELLING at home     [x] Yes   [] No   [] Further Education Provided    Patient able to verbalize one way to prevent FALLS upon discharge    [x] Yes   [] No   [] Further Education Provided

## 2023-08-18 NOTE — PLAN OF CARE
Problem: Adult Inpatient Plan of Care  Goal: Plan of Care Review  Outcome: Met  Goal: Patient-Specific Goal (Individualized)  Outcome: Met  Goal: Absence of Hospital-Acquired Illness or Injury  Outcome: Met  Goal: Optimal Comfort and Wellbeing  Outcome: Met  Goal: Readiness for Transition of Care  Outcome: Met     Problem: Diabetes Comorbidity  Goal: Blood Glucose Level Within Targeted Range  Outcome: Met     Problem: Infection  Goal: Absence of Infection Signs and Symptoms  Outcome: Met     Problem: Skin Injury Risk Increased  Goal: Skin Health and Integrity  Outcome: Met     Problem: Adjustment to Surgery (Knee Arthroplasty)  Goal: Optimal Coping  Outcome: Met     Problem: Bleeding (Knee Arthroplasty)  Goal: Absence of Bleeding  Outcome: Met     Problem: Bowel Motility Impaired (Knee Arthroplasty)  Goal: Effective Bowel Elimination  Outcome: Met     Problem: Fluid and Electrolyte Imbalance (Knee Arthroplasty)  Goal: Fluid and Electrolyte Balance  Outcome: Met     Problem: Functional Ability Impaired (Knee Arthroplasty)  Goal: Optimal Functional Ability  Outcome: Met     Problem: Infection (Knee Arthroplasty)  Goal: Absence of Infection Signs and Symptoms  Outcome: Met     Problem: Neurovascular Compromise (Knee Arthroplasty)  Goal: Intact Neurovascular Status  Outcome: Met     Problem: Ongoing Anesthesia Effects (Knee Arthroplasty)  Goal: Anesthesia/Sedation Recovery  Outcome: Met     Problem: Pain (Knee Arthroplasty)  Goal: Acceptable Pain Control  Outcome: Met     Problem: Postoperative Nausea and Vomiting (Knee Arthroplasty)  Goal: Nausea and Vomiting Relief  Outcome: Met     Problem: Postoperative Urinary Retention (Knee Arthroplasty)  Goal: Effective Urinary Elimination  Outcome: Met     Problem: Respiratory Compromise (Knee Arthroplasty)  Goal: Effective Oxygenation and Ventilation  Outcome: Met     Problem: Obstructive Sleep Apnea Risk or Actual  Goal: Unobstructed Breathing During Sleep  Outcome: Met      Problem: Hypertension Comorbidity  Goal: Blood Pressure in Desired Range  Outcome: Met

## 2023-08-18 NOTE — PLAN OF CARE
Problem: Physical Therapy  Goal: Physical Therapy Goal  Description: Pt will improve functional independence by performing:    Bed mobility: SBA - MET  Sit to stand: SBA- MET  Car Transfer: Min A  with rolling walker - MET  Ambulation x 150' feet with SBA and rolling walker - MET  1 Step (Curb): Min A  and rolling walker - MET  3 Steps: Min A  and L HR - MET  right knee AROM flexion (in degrees): 80  right knee AROM extension (in degrees): 0 - MET  Independent with total knee HEP - MET    Outcome: Met

## 2023-08-18 NOTE — PLAN OF CARE
08/18/23 1101   Discharge Assessment   Assessment Type Discharge Planning Assessment   Source of Information patient   Does patient/caregiver understand observation status Yes   Communicated JUAN with patient/caregiver Yes   Reason For Admission sp TKR   People in Home spouse   Do you expect to return to your current living situation? Yes   Do you have help at home or someone to help you manage your care at home? Yes   Who are your caregiver(s) and their phone number(s)?  -KAMALJIT [phone]   Prior to hospitilization cognitive status: Alert/Oriented   Current cognitive status: Alert/Oriented   Walking or Climbing Stairs ambulation difficulty, requires equipment   Dressing/Bathing bathing difficulty, requires equipment   Equipment Currently Used at Home walker, rolling   Readmission within 30 days? No   Patient currently being followed by outpatient case management? No   Do you currently have service(s) that help you manage your care at home? No   Do you take prescription medications? Yes   Do you have prescription coverage? Yes   Do you have any problems affording any of your prescribed medications? No   Is the patient taking medications as prescribed? yes   Who is going to help you get home at discharge? [facility]   How do you get to doctors appointments? car, drives self   Are you on dialysis? No   Do you take coumadin? No   DME Needed Upon Discharge  walker, rolling   Discharge Plan discussed with: Patient   Transition of Care Barriers None   Discharge Plan A Home with family   Discharge Plan B Home with family     S/p TKR. Spk w pt ... Kamaljit to  w homecare. Pt has RW. Provider list given. Foc obtained.   Called referral for outpatient [facility] to [facility]. They will contact pt with appt date & time.   PCP: Dr Tyesha Verma   Contact # [phone]      Patient DID participate in a pre-op exercise regimen  - home exer

## 2023-08-18 NOTE — PT/OT/SLP PROGRESS
"Physical Therapy Treatment    Patient Name:  Amita Trjeo   MRN:  [id number]    Recommendations:     Discharge Recommendations: outpatient PT, home  Discharge Equipment Recommendations: none  Barriers to discharge: None    Assessment:     Amita Trejo is a 72 y.o. female admitted with a medical diagnosis of Primary osteoarthritis of right knee.  She presents with the following impairments/functional limitations: weakness, impaired functional mobility, gait instability, decreased lower extremity function, pain, decreased ROM, edema, orthopedic precautions.    Rehab Prognosis: Good; patient would benefit from acute skilled PT services to address these deficits and reach maximum level of function.    Recent Surgery: Procedure(s) (LRB):  ROBOTIC ARTHROPLASTY, KNEE, TOTAL (Right) 1 Day Post-Op    Plan:     During this hospitalization, patient to be seen BID to address the identified rehab impairments via gait training, therapeutic activities, therapeutic exercises and progress toward the following goals:    Plan of Care Expires:  08/23/23    Subjective     Chief Complaint: R knee stiffness  Patient/Family Comments/goals: "To go home today"  Pain/Comfort:  Location - Side 1: Right  Location 1: knee  Pain Addressed 1: Pre-medicate for activity, Reposition, Distraction, Cessation of Activity      Objective:     Communicated with MOUNIKA Up prior to session.  Patient found up in chair with spouse present upon PT entry to room.     General Precautions: Standard, fall  Orthopedic Precautions: RLE weight bearing as tolerated  Braces: N/A  Respiratory Status: Room air     Functional Mobility:  Transfers:     Sit to Stand:  modified independence with rolling walker  Gait: 150ft with RW x2 trials, slow pace, Mod I   Stairs:  Pt ascended/descended 4" curb step with Rolling Walker with no handrails with Contact Guard Assistance.         Treatment & Education:  TKA HEP x10 RLE.    Pt seen for PT Last Visit session. Answered all last " questions/concerns as appropriate. Pt is ready for DC from hospital later today. This note to serve as DC Summary Note as well.     Patient left up in chair with call button in reach, Annel, NSG notified, and  present..    GOALS:   Multidisciplinary Problems       Physical Therapy Goals       Not on file              Multidisciplinary Problems (Resolved)          Problem: Physical Therapy    Goal Priority Disciplines Outcome Goal Variances Interventions   Physical Therapy Goal   (Resolved)     PT, PT/OT Met     Description: Pt will improve functional independence by performing:    Bed mobility: SBA - MET  Sit to stand: SBA- MET  Car Transfer: Min A  with rolling walker - MET  Ambulation x 150' feet with SBA and rolling walker - MET  1 Step (Curb): Min A  and rolling walker - MET  3 Steps: Min A  and L HR - MET  right knee AROM flexion (in degrees): 80  right knee AROM extension (in degrees): 0 - MET  Independent with total knee HEP - MET                         Time Tracking:     PT Received On:    PT Start Time: 1351     PT Stop Time: 1414  PT Total Time (min): 23 min     Billable Minutes: Therapeutic Activity 13 and Therapeutic Exercise 10    Treatment Type: Treatment  PT/PTA: PT     Number of PTA visits since last PT visit: 0     08/18/2023

## 2023-08-18 NOTE — NURSING
Nurse Note:   Pt left in WC with staff member. Pt stable, no distress, ready for discharge. Family member at bedside for instructions. Coverlets given. Pt advised to call MD office with any questions or concerns.     8/18/2023   3:15 PM      Perception of Care - Joint Replacement Population Total Joint Surgery List: KNEE    Patient able to verbalize one way to treat SWELLING at home (other than pain medication)    [x] Yes   [] No   [] Further Education Provided    Patient able to verbalize one way to prevent FALLS upon discharge    [x] Yes   [] No   [] Further Education Provided      Attending Nurse:  fabi martinez rn

## 2023-08-18 NOTE — PT/OT/SLP PROGRESS
"Physical Therapy Treatment    Patient Name:  Amita Trejo   MRN:  [id number]    Recommendations:     Discharge Recommendations: outpatient PT, home  Discharge Equipment Recommendations: none  Barriers to discharge: None    Assessment:     Amita Trejo is a 72 y.o. female admitted with a medical diagnosis of Primary osteoarthritis of right knee.  She presents with the following impairments/functional limitations: weakness, impaired functional mobility, gait instability, decreased lower extremity function, pain, decreased ROM, edema, orthopedic precautions.    Rehab Prognosis: Good; patient would benefit from acute skilled PT services to address these deficits and reach maximum level of function.    Recent Surgery: Procedure(s) (LRB):  ROBOTIC ARTHROPLASTY, KNEE, TOTAL (Right) 1 Day Post-Op    Plan:     During this hospitalization, patient to be seen BID to address the identified rehab impairments via gait training, therapeutic activities, therapeutic exercises and progress toward the following goals:    Plan of Care Expires:  08/23/23    Subjective     Chief Complaint: R knee pain  Patient/Family Comments/goals: "To walk"  Pain/Comfort:  Location - Side 1: Right  Location 1: knee  Pain Addressed 1: Pre-medicate for activity, Reposition, Distraction, Cessation of Activity      Objective:     Communicated with MOUNIKA Up prior to session.  Patient found HOB elevated with peripheral IV upon PT entry to room.     General Precautions: Standard, fall  Orthopedic Precautions: RLE weight bearing as tolerated  Braces: N/A  Respiratory Status: Room air     Functional Mobility:  Bed Mobility:     Sit to Supine: stand by assistance  Transfers:     Sit to Stand:  stand by assistance with rolling walker  Car Transfer: stand by assistance with  rolling walker  using  Step Transfer  Gait: 200ft with RW, SBA  Stairs:  Pt ascended/descended 3 stair(s) with No Assistive Device with left handrail with Contact Guard Assistance. "       (In degrees) AROM PROM   R knee flexion 65 70   R knee extension 0     *Note: pt's L knee has 80 degrees of flexion and almost full extension.    Treatment & Education:  TKA HEP x10 RLE. Pt demonstrates good form and understanding of all exercises.     Patient left up in chair with all lines intact, call button in reach, Annel, NSG notified, and spouse present.    GOALS:   Multidisciplinary Problems       Physical Therapy Goals       Not on file              Multidisciplinary Problems (Resolved)          Problem: Physical Therapy    Goal Priority Disciplines Outcome Goal Variances Interventions   Physical Therapy Goal   (Resolved)     PT, PT/OT Met     Description: Pt will improve functional independence by performing:    Bed mobility: SBA - MET  Sit to stand: SBA- MET  Car Transfer: Min A  with rolling walker - MET  Ambulation x 150' feet with SBA and rolling walker - MET  1 Step (Curb): Min A  and rolling walker - MET  3 Steps: Min A  and L HR - MET  right knee AROM flexion (in degrees): 80  right knee AROM extension (in degrees): 0 - MET  Independent with total knee HEP - MET                         Time Tracking:     PT Received On:    PT Start Time: 1056     PT Stop Time: 1119  PT Total Time (min): 23 min     Billable Minutes: Therapeutic Activity 13 and Therapeutic Exercise 10    Treatment Type: Treatment  PT/PTA: PT     Number of PTA visits since last PT visit: 0     08/18/2023

## 2023-08-18 NOTE — DISCHARGE INSTRUCTIONS
[facility]  [address]  [place] [address]  Phone [phone]       /      Fax [phone]  SURGEON: Dr. Barone    After discharge, all questions or concerns should be handled at your surgeon's office [phone]). If it is a weekend or after hours, you will get the surgeon on call.     Discharge Medications:    PAIN MANAGEMENT: Next Dose Available   Toradol/Ketorolac 10mg (Anti-inflammatory) - take every 8 hours, around the clock, for the next 5 days Start today   Robaxin/Methocarbamol 750mg (Muscle Relaxer) - Every 6-8 hours AS NEEDED for muscle spasms, thigh pain or additional pain control Start anytime   Norco 5/325mg (Hydrocodone/Acetaminophen) (Pain Med) - every 4-6 hours AS NEEDED for pain 3:00pm   COMPLICATION PREVENTION MEDS: Next Dose DUE   Aspirin 81mg twice a day for 6 weeks post-op for blood clot prevention PM on 8/18/2023   MiraLAX 17gm - once or twice a day while on narcotics and muscle relaxers for constipation prevention PM on 8/18/2023   Take a medication once or twice a day while taking TORADOL and Aspirin at the same time to prevent heartburn PM on 8/18/2023     Total Knee Replacement                                                                                                                                    PAIN MEDICATIONS/PAIN MANAGEMENT: (Use the medication log in your discharge packet to keep track of your medications)  Toradol/Ketorolac 10mg (anti-inflammatory) - take every 8 hours around the clock for the next 5 days.   While on Toradol/Ketorolac and Aspirin (blood thinner) at the same time, take a medication once or twice a day to protect your stomach (for the next 5 days) (Examples: Nexium, Prilosec, Prevacid, Omeprazole, Pepcid...etc). If you start having intolerable stomach issues, discontinue the Toradol completely.   Aside from Toradol, No other anti-inflammatories (Ibuprofen, Aleve, Motrin, Naproxen, Mobic/Meloxicam, Celebrex,  Diclofenac/Voltaren...etc) for 2 weeks or until the wound is healed.      Norco 5/325mg (Hydrocodone/Acetaminophen) (pain pill) - You can take 1 tablet every 4-6 hours for pain. If the pain is mild, take 1/2 of a pill. Once you start taking a half of a pain pill, you can take a Tylenol 325mg with each dose for a little extra pain relief without side effects. Gradually decrease the use as the pain lessens. As you decrease the Norco, increase the Tylenol.    **NO MORE THAN 3000mg OF TYLENOL IN 24 HOURS**.     Robaxin/Methocarbamol 750mg (muscle relaxer)- you can take every 6-8 hours as needed for muscle spasms, thigh pain and stiffness, additional pain control or breakthrough pain medications. This medication is helpful for pain control while lessening your need for narcotics. Please reduce the use gradually as the pain and spasms lessen. DO NOT TAKE AT THE SAME TIME AS A PAIN PILL. YOU WILL BE BETTER SERVED WITH 2 HOURS BETWEEN PAIN PILL AND MUSCLE RELAXER.     **Other things that help with pain control is WALKING, COMPRESSION WRAP, ICE and ELEVATION!!**    BLOOD CLOT PREVENTION:   Aspirin 81mg (blood thinner) - twice a day for 6 weeks for blood clot prevention. Start on the evening of 8/18/23. Stop on 9/29/23.    You need to continuing wearing your compression stocking (MIKEY Hose - ThromboEmbolic Disease Prevention Device) for the next 2-6 weeks post-op. It is ok to remove them for hygiene and at bedtime.   Hand wash and Dry. **If the swelling persists in the legs after you stop wearing the Mikey hose, continue to wear them until the swelling decreases.**  REMOVE STOCKINGS AT LEAST DAILY FOR SKIN ASSESSMENT.   Do NOT let the stockings roll down, creating a tourniquet around the back of your knee. If you need to, leave the excess at the bottom of the stocking.   The best thing you can do to prevent blood clots is to walk around as much as possible, AT LEAST EVERY 1-2 HOURS.       CONSTIPATION PREVENTION:   Miralax or  Senokot S/Monica-Colace and Stool softeners EVERY DAY while on pain meds.  Use other more aggressive over the counter LAXATIVES as needed for constipation (Examples: Milk of Magnesia, Dulcolax tabs or suppository, Magnesium Citrate, Fleet's Enema...etc.)   Drink lots of water.  Increase Fiber in diet.  Increase walking distance each day  DO NOT GO MORE THAN 2 DAYS WITHOUT HAVING A BOWEL MOVEMENT!    ACTIVITY:   Weight bearing precautions as follows:  FULL weight bearing to operative leg with walker.   DO NOT TAKE YOURSELF OFF OF THE WALKER TOO SOON. ALLOW YOUR OUTPATIENT THERAPIST or SURGEON TO GUIDE YOU.   Range of motion as tolerated. Work on BENDING and STRAIGHTENING your knee. Change positions often throughout the day. DO NOT PUT ANYTHING BEHIND THE KNEE KEEPING IT IN A BENT POSITION.   Elevate affected extremity way ABOVE THE LEVEL OF THE HEART to reduce swelling.  Walk around at least every 1-2 hours while awake.   No heavy lifting, pulling, pushing or straining.  Ice the Knee, thigh and lower leg AS MUCH AS POSSIBLE  Outpatient Physical Therapy - Bring prescription to clinic of choice as soon as possible.      WOUND CARE:   Dry dressing changes every other day and as needed for soiling for 14 days. Start SUNDAY. NOTIFY MD OF EXCESSIVE WOUND DRAINAGE.     DO NOT WET WOUND or apply any ointments, creams, lotions or antiseptics.  Ace wrap - apply your compression stocking and apply the ace wrap where the stocking stops for extra added compression to the knee.   May wet incision AFTER 2 weeks- (after you follow-up with your surgeon).   Ok to shower before then if able to keep wound from getting wet (plastic barrier, saran wrap or cling wrap and tape).   DO NOT TOUCH INCISION      URINARY RETENTION:  If you start having difficulty urinating, decrease the use of Pain pills and muscle relaxers and notify your primary care doctor.     PNEUMONIA PREVENTION:  Stay out of bed as much as possible and walk around every 1-2  hours.  Continue breathing exercises (Incentive Spirometry) every 1-2 hours while mobility is limited and while you are on pain pills.    FALL PREVENTION:  Wear sturdy shoes that fit well - Wearing shoes with high heels or slippery soles, or shoes that are too loose, can lead to falls. Walking around in bare feet, or only socks, can also increase your risk of falling.  Use walker as long as your surgeon and therapist recommend it  Use good lighting and  throw rugs, electrical cords, furniture and clutter (anything than can cause you to trip at home.   Non-slip rug in bathroom or shower      INFECTION PREVENTION:  Proper handwashing before and after dressing changes. Do not wet the wound. Wound care instructions as written above. NOTIFY MD OF EXCESSIVE WOUND DRAINAGE.  No alcohol, smoking or tobacco products  Pets should not be allowed around the wound or the dressing.   Treat UTI and skin infections as soon as possible.  Pre-medicate with antibiotics prior to dental or surgical procedures.   If you are diabetic, MAINTAIN GOOD BLOOD SUGAR CONTROL (Below 150) DURING YOUR RECOVERY. If you see high numbers, notify your primary care doctor.     Call your SURGEON'S OFFICE ([phone]) if you experience the following signs and symptoms of infection:   Unusual redness, swelling, excessive, cloudy or foul smelling drainage at the incision site.   Persistent low grade temp OR a temp greater than 102 F, unrelieved by Tylenol  Pain at surgical site, unrelieved by pain meds    Warning signs of a blood clot in your leg: (CALL YOUR SURGEON)  New onset or increasing pain in calf, new onset tenderness or redness above or below the knee or increasing swelling of your calf, ankle, or foot.  Warning signs that a blood clot has traveled to your lungs: (REPORT TO THE ER/CALL 367)  Sudden or increase in Shortness of breath, sudden onset of chest pains, or  Localized chest pain with coughing.       IF ANY ISSUES ARISE AND YOU FEEL THE  NEED TO CALL YOUR PRIMARY CARE DOCTOR, PLEASE LET YOUR SURGEON KNOW AS WELL.     For emergencies, please report to OUR ([facility] or [facility] main [facility]) Emergency department and tell them to call YOUR SURGEON at [phone].     BEFORE MAKING ANY CHANGES TO THE MEDICAL CARE PLAN OR GOING TO THE EMERGENCY ROOM, PLEASE CONTACT THE SURGEON.    3rd floor nursing unit # [phone]  Use this number for questions about your discharge instructions or problems filling your discharge prescriptions.

## 2023-08-21 NOTE — DISCHARGE SUMMARY
[facility]  Discharge Note  Short Stay    Admit Date: 8/17/2023    Discharge Date and Time: 8/18/2023  3:24 PM     Attending Physician: No att. providers found     Discharge Provider: Gustabo Rosenthal    Diagnoses:  Active Hospital Problems    Diagnosis  POA    *Primary osteoarthritis of right knee [M17.11]  Yes      Resolved Hospital Problems   No resolved problems to display.       Discharged Condition: good    Hospital Course:    Patient presented for elective robotic assisted right total knee arthroplasty The patient had no complications during the hospital stay and was discharged home in stable condition full weightbearing with assistance of a walker. The patient was given a consult for physical therapy and rehab as well as a follow-up appointment in our office in 2 weeks for reevaluation. The patient was instructed on wound care and dressing change as well as to continue the AUGUSTA hose while at home. Prescriptions for continuation of the VTE prophylaxis and pain control were given. The patients home medications were resumed please see discharge med rec for that.     Final Diagnoses: Same as principal problem.    Disposition: Home or Self Care    Follow up/Patient Instructions:    Medications:  Reconciled Home Medications:      Medication List        START taking these medications      aspirin 81 MG EC tablet  Commonly known as: ECOTRIN  Take 1 tablet (81 mg total) by mouth every 12 (twelve) hours. Blood clot prevention     HYDROcodone-acetaminophen 5-325 mg per tablet  Commonly known as: NORCO  Take 1 tablet by mouth every 4 (four) hours as needed for Pain.     ketorolac 10 mg tablet  Commonly known as: TORADOL  Take 1 tablet (10 mg total) by mouth every 8 (eight) hours. for 5 days     methocarbamoL 750 MG Tab  Commonly known as: ROBAXIN  Take 1 tablet (750 mg total) by mouth every 6 (six) hours. for 14 days     polyethylene glycol 17 gram Pwpk  Commonly known as: GLYCOLAX  Take 17 g by mouth once  daily. for 14 days            CONTINUE taking these medications      amLODIPine 5 MG tablet  Commonly known as: NORVASC  Take 1 tablet (5 mg total) by mouth every evening.     B COMPLEX 1 ORAL  Take 1 tablet by mouth once daily.     B-complex with vitamin C tablet  Commonly known as: Z-Bec or Equiv  Take 1 tablet by mouth once daily.     cholecalciferol (vitamin D3) 125 mcg (5,000 unit) capsule  Take 5,000 Units by mouth once daily.     lisdexamfetamine 70 MG capsule  Commonly known as: VYVANSE  Take 70 mg by mouth every morning.     metoprolol tartrate 50 MG tablet  Commonly known as: LOPRESSOR  Take 75 mg by mouth once daily at 6am.     pantoprazole 40 MG tablet  Commonly known as: PROTONIX  40 mg once daily.     polysaccharide iron complex 200 mg iron Cap  Take 1 capsule by mouth Daily.     pramipexole 1.5 MG tablet  Commonly known as: MIRAPEX  Take 1.5 mg by mouth 2 (two) times a day.     telmisartan-hydrochlorothiazide 40-12.5 mg per tablet  Commonly known as: MICARDIS HCT  Take 1 tablet by mouth once daily.            No discharge procedures on file.   Follow-up Information       Isidro Barone MD Follow up on 9/1/2023.    Specialty: Orthopedic Surgery  Why: Orthopedic follow up appointment on Friday, 09/01 at 8:30am with Cale (physician assistant).  Contact information:  [address] [place]  [address]  [facility] [address]  [phone]               [place] Follow up.    Specialty: Physical Therapy  Why: This is the outpatient therapy facility. They will contact pt with appt date & time. Call if you have questions or concerns.  Contact information:  [address]  [facility] [address]  [phone]                             Discharge Procedure Orders (must include Diet, Follow-up, Activity):  No discharge procedures on file.

## 2023-08-23 LAB — VIEW PATHOLOGY REPORT (RELIAPATH): NORMAL

## 2023-08-27 ENCOUNTER — HOSPITAL ENCOUNTER (EMERGENCY)
Facility: HOSPITAL | Age: 73
Discharge: HOME | End: 2023-08-27
Attending: INTERNAL MEDICINE
Payer: MEDICARE

## 2023-08-27 VITALS
OXYGEN SATURATION: 98 % | WEIGHT: 174 LBS | SYSTOLIC BLOOD PRESSURE: 137 MMHG | DIASTOLIC BLOOD PRESSURE: 61 MMHG | HEIGHT: 60 IN | TEMPERATURE: 98 F | RESPIRATION RATE: 18 BRPM | BODY MASS INDEX: 34.16 KG/M2 | HEART RATE: 79 BPM

## 2023-08-27 DIAGNOSIS — M13.161 INFLAMMATION OF JOINT OF RIGHT KNEE: Primary | ICD-10-CM

## 2023-08-27 DIAGNOSIS — G89.18 POST-OPERATIVE PAIN: ICD-10-CM

## 2023-08-27 LAB
ABS NEUT CALC (OHS): 3.67 X10(3)/MCL (ref 2.1–9.2)
ALBUMIN SERPL-MCNC: 3.6 G/DL (ref 3.4–4.8)
ALBUMIN/GLOB SERPL: 1.2 RATIO (ref 1.1–2)
ALP SERPL-CCNC: 83 UNIT/L (ref 40–150)
ALT SERPL-CCNC: 10 UNIT/L (ref 0–55)
ANISOCYTOSIS BLD QL SMEAR: SLIGHT
AST SERPL-CCNC: 19 UNIT/L (ref 5–34)
BILIRUB SERPL-MCNC: 0.7 MG/DL
BUN SERPL-MCNC: 22.6 MG/DL (ref 9.8–20.1)
CALCIUM SERPL-MCNC: 9.4 MG/DL (ref 8.4–10.2)
CHLORIDE SERPL-SCNC: 101 MMOL/L (ref 98–107)
CO2 SERPL-SCNC: 26 MMOL/L (ref 23–31)
CREAT SERPL-MCNC: 0.79 MG/DL (ref 0.55–1.02)
EOSINOPHIL NFR BLD MANUAL: 0.13 X10(3)/MCL (ref 0–0.9)
EOSINOPHIL NFR BLD MANUAL: 1 % (ref 0–8)
ERYTHROCYTE [DISTWIDTH] IN BLOOD BY AUTOMATED COUNT: 13.1 % (ref 11.5–17)
GFR SERPLBLD CREATININE-BSD FMLA CKD-EPI: >60 MLS/MIN/1.73/M2
GLOBULIN SER-MCNC: 2.9 GM/DL (ref 2.4–3.5)
GLUCOSE SERPL-MCNC: 99 MG/DL (ref 82–115)
HCT VFR BLD AUTO: 29.7 % (ref 37–47)
HGB BLD-MCNC: 9.7 G/DL (ref 12–16)
LYMPH ABN # BLD MANUAL: 2 %
LYMPHOCYTES NFR BLD MANUAL: 64 % (ref 13–40)
LYMPHOCYTES NFR BLD MANUAL: 8.38 X10(3)/MCL
MACROCYTES BLD QL SMEAR: SLIGHT
MCH RBC QN AUTO: 31.3 PG (ref 27–31)
MCHC RBC AUTO-ENTMCNC: 32.7 G/DL (ref 33–36)
MCV RBC AUTO: 95.8 FL (ref 80–94)
MICROCYTES BLD QL SMEAR: SLIGHT
MONOCYTES NFR BLD MANUAL: 0.66 X10(3)/MCL (ref 0.1–1.3)
MONOCYTES NFR BLD MANUAL: 5 % (ref 2–11)
NEUTROPHILS NFR BLD MANUAL: 28 % (ref 47–80)
NRBC BLD AUTO-RTO: 0 %
PLATELET # BLD AUTO: 248 X10(3)/MCL (ref 130–400)
PLATELET # BLD EST: ADEQUATE 10*3/UL
PMV BLD AUTO: 8.5 FL (ref 7.4–10.4)
POLYCHROMASIA BLD QL SMEAR: SLIGHT
POTASSIUM SERPL-SCNC: 4.5 MMOL/L (ref 3.5–5.1)
PROT SERPL-MCNC: 6.5 GM/DL (ref 5.8–7.6)
RBC # BLD AUTO: 3.1 X10(6)/MCL (ref 4.2–5.4)
RBC MORPH BLD: ABNORMAL
SODIUM SERPL-SCNC: 137 MMOL/L (ref 136–145)
WBC # SPEC AUTO: 13.1 X10(3)/MCL (ref 4.5–11.5)

## 2023-08-27 PROCEDURE — 85027 COMPLETE CBC AUTOMATED: CPT | Performed by: INTERNAL MEDICINE

## 2023-08-27 PROCEDURE — 99284 EMERGENCY DEPT VISIT MOD MDM: CPT

## 2023-08-27 PROCEDURE — 25000003 PHARM REV CODE 250: Performed by: INTERNAL MEDICINE

## 2023-08-27 PROCEDURE — 80053 COMPREHEN METABOLIC PANEL: CPT | Performed by: INTERNAL MEDICINE

## 2023-08-27 RX ORDER — MUPIROCIN 20 MG/G
OINTMENT TOPICAL 3 TIMES DAILY
Qty: 22 G | Refills: 0 | Status: ON HOLD | OUTPATIENT
Start: 2023-08-27 | End: 2024-01-04 | Stop reason: CLARIF

## 2023-08-27 RX ORDER — IBUPROFEN 400 MG/1
400 TABLET ORAL
Status: COMPLETED | OUTPATIENT
Start: 2023-08-27 | End: 2023-08-27

## 2023-08-27 RX ORDER — ONDANSETRON 4 MG/1
4 TABLET, ORALLY DISINTEGRATING ORAL ONCE
Status: COMPLETED | OUTPATIENT
Start: 2023-08-27 | End: 2023-08-27

## 2023-08-27 RX ORDER — CEPHALEXIN 500 MG/1
500 CAPSULE ORAL EVERY 12 HOURS
Qty: 20 CAPSULE | Refills: 0 | Status: SHIPPED | OUTPATIENT
Start: 2023-08-27 | End: 2023-09-06

## 2023-08-27 RX ORDER — HYDROCODONE BITARTRATE AND ACETAMINOPHEN 7.5; 325 MG/1; MG/1
1 TABLET ORAL ONCE
Status: COMPLETED | OUTPATIENT
Start: 2023-08-27 | End: 2023-08-27

## 2023-08-27 RX ADMIN — IBUPROFEN 400 MG: 400 TABLET ORAL at 12:08

## 2023-08-27 RX ADMIN — ONDANSETRON 4 MG: 4 TABLET, ORALLY DISINTEGRATING ORAL at 12:08

## 2023-08-27 RX ADMIN — HYDROCODONE BITARTRATE AND ACETAMINOPHEN 1 TABLET: 7.5; 325 TABLET ORAL at 12:08

## 2023-08-27 NOTE — ED PROVIDER NOTES
Source of History:  Patient, no limitations    Chief complaint:  Knee Pain (Pt with hx of right knee replacement one week ago, c/o redness to incision site and pain to posterior leg.)      HPI:  Amita Trejo is a 72 y.o. female presenting with Knee Pain (Pt with hx of right knee replacement one week ago, c/o redness to incision site and pain to posterior leg.)    Post op day 9, right TKA with Dr Barone, presents to ED for increasing redness and pain to right knee    Patient presents with knee pain involving the  right knee. Onset of the symptoms was 2 day ago. Inciting event:  post operative . Current symptoms include pain located lateral surface of knee and swelling. Pain is aggravated by walking and bending .  Patient has had prior knee problems..        Review of Systems   Constitutional symptoms:  Negative except as documented in HPI.   Skin symptoms:  Negative except as documented in HPI.   HEENT symptoms:  Negative except as documented in HPI.   Respiratory symptoms:  Negative except as documented in HPI.   Cardiovascular symptoms:  Negative except as documented in HPI.   Gastrointestinal symptoms:  Negative except as documented in HPI.    Genitourinary symptoms:  Negative except as documented in HPI.   Musculoskeletal symptoms:  Negative except as documented in HPI.   Neurologic symptoms:  Negative except as documented in HPI.   Psychiatric symptoms:  Negative except as documented in HPI.   Allergy/immunologic symptoms:  Negative except as documented in HPI.             Additional review of systems information: All other systems reviewed and otherwise negative.      Review of patient's allergies indicates:   Allergen Reactions    Duloxetine      Other reaction(s): Bradycardia, Hypotension, Vomit    Lorazepam      Other reaction(s): Confusion  amnesia      Tramadol Other (See Comments)     nightmares       PMH:  As per HPI and below:    Past Medical History:   Diagnosis Date    Acid reflux     Arthritis      chronic lymphocytic leukemia     Hypertension     Renal cyst     Sleep apnea         Family History   Problem Relation Age of Onset    Hypertension Mother     COPD Mother     Hearing loss Mother     Hypertension Father     COPD Father     Hearing loss Father     Breast cancer Sister     Cancer Sister     Diabetes Maternal Grandmother         Reina    Diabetes Paternal Grandmother        Past Surgical History:   Procedure Laterality Date    BREAST SURGERY Left ?    lumpectomy, NO Restrictions    CARPAL TUNNEL RELEASE      CYST REMOVAL      EPIDURAL STEROID INJECTION      JOINT REPLACEMENT      Left knee    KNEE JOINT MANIPULATION Left 2022    Procedure: MANIPULATION, KNEE;  Surgeon: Sohan Bowens MD;  Location: [facility] OR;  Service: Orthopedics;  Laterality: Left;    REVISION OF KNEE ARTHROPLASTY Left 2022    Procedure: REVISION, ARTHROPLASTY, KNEE;  Surgeon: Sohan Bowens MD;  Location: [facility] OR;  Service: Orthopedics;  Laterality: Left;  FEMUR / PATHOLOGY / ASHLEY    ROBOTIC ARTHROPLASTY, KNEE Right 2023    Procedure: ROBOTIC ARTHROPLASTY, KNEE, TOTAL;  Surgeon: Isidro Barone MD;  Location: [facility] OR;  Service: Orthopedics;  Laterality: Right;    SHOULDER SURGERY Right     SINUS SURGERY      x 2    SPINE SURGERY      Bulging disc in lower back    THYROID SURGERY      TONSILLECTOMY         Social History     Tobacco Use    Smoking status: Former     Current packs/day: 0.00     Average packs/day: 1 pack/day for 10.0 years (10.0 ttl pk-yrs)     Types: Cigarettes     Start date: 3/18/1976     Quit date: 3/18/1986     Years since quittin.4    Smokeless tobacco: Former   Substance Use Topics    Alcohol use: Not Currently    Drug use: Never       Patient Active Problem List   Diagnosis    Arthrofibrosis of total knee arthroplasty, sequela    Failed total knee arthroplasty    Lymphoma, small lymphocytic    Primary osteoarthritis of right knee    Gastroesophageal reflux disease     Attention deficit hyperactivity disorder, predominantly inattentive type    Chronic fatigue syndrome    Chronic lymphocytic leukemia    Diastolic dysfunction    Dyspnea    History of varicose veins    Hypertension    Impaired fasting glucose    Increased frequency of urination    Obesity    Mitral valve prolapse    Pulmonary hypertension    Obstructive sleep apnea syndrome    Type 2 diabetes mellitus without complication    Stage 3 chronic kidney disease    Restless legs syndrome        Physical Exam:    /61 (BP Location: Left arm, Patient Position: Sitting)   Pulse 79   Temp 98.2 °F (36.8 °C) (Temporal)   Resp 18   Ht 5' (1.524 m)   Wt 78.9 kg (174 lb)   SpO2 98%   Breastfeeding No   BMI 33.98 kg/m²     Nursing note and vital signs reviewed.    General:  Alert, no acute distress.   Skin: Normal for Ethnic Origin, No cyanosis, see picture below, post operative bruising, erythema and warmth developing at incision site, do not appreciate involvement of the knee joint   HEENT: Normocephalic and atraumatic, Vision unchanged, Pupils symmetric, No icterus , Nasal mucosa is pink and moist  Cardiovascular:  Regular rate and rhythm, No edema  Chest Wall: No deformity, equal chest rise  Respiratory:  Lungs are clear to auscultation, respirations are non-labored.    Musculoskeletal:  post operative bruising, erythema and warmth developing at incision site, do not appreciate involvement of the knee joint  Gastrointestinal:  Soft, Non distended  Neurological:  Alert and oriented, normal motor observed, normal speech observed.    Psychiatric:  Cooperative, appropriate mood & affect.       Media Information      Labs that have been ordered have been independently reviewed and interpreted by myself.     Old Chart Reviewed.      Initial Impression/ Differential Dx:  Knee contusion, sprain, fracture, referred pain from hip or ankle, lumbar radiculopathy, ITB syndrome, septic arthritis, osteoarthritis, effusion, meniscus  injury, cellulitis      MDM:      Reviewed Nurses Note.    Reviewed Pertinent old records.    Orders Placed This Encounter    X-Ray Knee Complete 4 Or More Views Right    CBC Auto Differential    Comprehensive Metabolic Panel    CBC with Differential    Manual Differential    Inpatient consult to Orthopedic Surgery    HYDROcodone-acetaminophen 7.5-325 mg per tablet 1 tablet    ondansetron disintegrating tablet 4 mg    ibuprofen tablet 400 mg    cephALEXin (KEFLEX) 500 MG capsule    mupirocin (BACTROBAN) 2 % ointment                    Labs Reviewed   COMPREHENSIVE METABOLIC PANEL - Abnormal; Notable for the following components:       Result Value    Blood Urea Nitrogen 22.6 (*)     All other components within normal limits   CBC WITH DIFFERENTIAL - Abnormal; Notable for the following components:    WBC 13.10 (*)     RBC 3.10 (*)     Hgb 9.7 (*)     Hct 29.7 (*)     MCV 95.8 (*)     MCH 31.3 (*)     MCHC 32.7 (*)     All other components within normal limits   MANUAL DIFFERENTIAL - Abnormal; Notable for the following components:    Neutrophils % 28 (*)     Lymphs % 64 (*)     Lymphs Abs 8.384 (*)     RBC Morph Abnormal (*)     Anisocytosis Slight (*)     Microcytosis Slight (*)     Macrocytosis Slight (*)     Polychromasia Slight (*)     All other components within normal limits   CBC W/ AUTO DIFFERENTIAL    Narrative:     The following orders were created for panel order CBC Auto Differential.  Procedure                               Abnormality         Status                     ---------                               -----------         ------                     CBC with Differential[[id number]]        Abnormal            Final result               Manual Differential[[id number]]          Abnormal            Final result                 Please view results for these tests on the individual orders.          X-Ray Knee Complete 4 Or More Views Right   Final Result      As above.         Electronically signed  by: González Back   Date:    08/27/2023   Time:    12:39           Admission on 08/27/2023   Component Date Value Ref Range Status    Sodium Level 08/27/2023 137  136 - 145 mmol/L Final    Potassium Level 08/27/2023 4.5  3.5 - 5.1 mmol/L Final    Chloride 08/27/2023 101  98 - 107 mmol/L Final    Carbon Dioxide 08/27/2023 26  23 - 31 mmol/L Final    Glucose Level 08/27/2023 99  82 - 115 mg/dL Final    Blood Urea Nitrogen 08/27/2023 22.6 (H)  9.8 - 20.1 mg/dL Final    Creatinine 08/27/2023 0.79  0.55 - 1.02 mg/dL Final    Calcium Level Total 08/27/2023 9.4  8.4 - 10.2 mg/dL Final    Protein Total 08/27/2023 6.5  5.8 - 7.6 gm/dL Final    Albumin Level 08/27/2023 3.6  3.4 - 4.8 g/dL Final    Globulin 08/27/2023 2.9  2.4 - 3.5 gm/dL Final    Albumin/Globulin Ratio 08/27/2023 1.2  1.1 - 2.0 ratio Final    Bilirubin Total 08/27/2023 0.7  <=1.5 mg/dL Final    Alkaline Phosphatase 08/27/2023 83  40 - 150 unit/L Final    Alanine Aminotransferase 08/27/2023 10  0 - 55 unit/L Final    Aspartate Aminotransferase 08/27/2023 19  5 - 34 unit/L Final    eGFR 08/27/2023 >60  mls/min/1.73/m2 Final    WBC 08/27/2023 13.10 (H)  4.50 - 11.50 x10(3)/mcL Final    RBC 08/27/2023 3.10 (L)  4.20 - 5.40 x10(6)/mcL Final    Hgb 08/27/2023 9.7 (L)  12.0 - 16.0 g/dL Final    Hct 08/27/2023 29.7 (L)  37.0 - 47.0 % Final    MCV 08/27/2023 95.8 (H)  80.0 - 94.0 fL Final    MCH 08/27/2023 31.3 (H)  27.0 - 31.0 pg Final    MCHC 08/27/2023 32.7 (L)  33.0 - 36.0 g/dL Final    RDW 08/27/2023 13.1  11.5 - 17.0 % Final    Platelet 08/27/2023 248  130 - 400 x10(3)/mcL Final    MPV 08/27/2023 8.5  7.4 - 10.4 fL Final    NRBC% 08/27/2023 0.0  % Final    Neutrophils % 08/27/2023 28 (L)  47 - 80 % Final    Lymphs % 08/27/2023 64 (H)  13 - 40 % Final    Monocytes % 08/27/2023 5  2 - 11 % Final    Eosinophils % 08/27/2023 1  0 - 8 % Final    Abnormal Lymphs % 08/27/2023 2  % Final    Neutrophils Abs Calc 08/27/2023 3.668  2.1 - 9.2 x10(3)/mcL Final    Lymphs Abs  08/27/2023 8.384 (H)  0.6 - 4.6 x10(3)/mcL Final    Eosinophils Abs 08/27/2023 0.131  0 - 0.9 x10(3)/mcL Final    Monocytes Abs 08/27/2023 0.655  0.1 - 1.3 x10(3)/mcL Final    Platelets 08/27/2023 Adequate  Normal, Adequate Final    RBC Morph 08/27/2023 Abnormal (A)  Normal Final    Anisocytosis 08/27/2023 Slight (A)  (none) Final    Microcytosis 08/27/2023 Slight (A)  (none) Final    Macrocytosis 08/27/2023 Slight (A)  (none) Final    Polychromasia 08/27/2023 Slight (A)  (none) Final       Imaging Results              X-Ray Knee Complete 4 Or More Views Right (Final result)  Result time 08/27/23 12:39:43      Final result by González Back MD (08/27/23 12:39:43)                   Impression:      As above.      Electronically signed by: González Back  Date:    08/27/2023  Time:    12:39               Narrative:    EXAMINATION:  XR KNEE COMP 4 OR MORE VIEWS RIGHT    CLINICAL HISTORY:  knee pain;    TECHNIQUE:  Four views    COMPARISON:  June 26, 2023    FINDINGS:  There is right knee arthroplasty which is in satisfactory position and alignment.  Postsurgical soft tissue inflammations and anterior skin stables.                                                     ED Course as of 08/27/23 1318   Sun Aug 27, 2023   1318 Spoke with ortho on call, lluvia Giordano with PO abx and clinic follow up [MP]      ED Course User Index  [MP] Ramiro Fry DO                        Diagnostic Impression:    1. Inflammation of joint of right knee    2. Post-operative pain         ED Disposition Condition    Discharge Stable             Follow-up Information       Isidro Barone MD.    Specialty: Orthopedic Surgery  Contact information:  [address]  [address]  [facility] [address] [phone]               [facility] - Emergency Dept.    Specialty: Emergency Medicine  Why: If symptoms worsen  Contact information:  [address]  [facility] [address] [phone]                             ED Prescriptions       Medication Sig Dispense Start Date End Date Auth. Provider    cephALEXin (KEFLEX) 500 MG capsule Take 1 capsule (500 mg total) by mouth every 12 (twelve) hours. for 10 days 20 capsule 8/27/2023 9/6/2023 Ramiro Fry DO    mupirocin (BACTROBAN) 2 % ointment Apply topically 3 (three) times daily. 22 g 8/27/2023 -- Ramiro Fry DO          Follow-up Information       Follow up With Specialties Details Why Contact Info    Isidro Barone MD Orthopedic Surgery   [address]  [address]  [facility] [address]  [phone]      [facility] - Emergency Dept Emergency Medicine  If symptoms worsen [address]  [facility] [id number] [phone]             Ramiro Fry DO  08/27/23 3763

## 2023-08-27 NOTE — ED TRIAGE NOTES
Pt with hx of right knee replacement one week ago, c/o redness to incision site and pain to posterior leg.

## 2023-08-28 ENCOUNTER — TELEPHONE (OUTPATIENT)
Dept: ORTHOPEDICS | Facility: CLINIC | Age: 73
End: 2023-08-28
Payer: MEDICARE

## 2023-08-28 NOTE — TELEPHONE ENCOUNTER
Patient called the office stating that she went to the ER over the weekend due to redness around the incision site.     I called the patient to see how she is doing. She stated that the redness is improving, but she wanted to see if she needed to use the bactroban ointment. I let her know to not use this and to continue with the oral Keflex that was given.     I also let her know to call the office on 08/29/23. If the redness is not improving, then Gustabo agreed to see her on 08/30/23.     The patient verbalized a clear understanding and stated that she will call back in the morning for an update.

## 2023-08-29 NOTE — TELEPHONE ENCOUNTER
I called the patient back and relayed Gustabo's message. She stated that she will email a picture of her incision on 08/30/23, that way I can upload it in her chart.     The patient has an appointment for post op on 9/1/23.     I let her know to call if needed.     She verbalized a clear understanding.

## 2023-08-29 NOTE — TELEPHONE ENCOUNTER
Patient called stating that her incision is still the same as it was when she went to the ER on 08/27/23. She stated that is still taking the ABX that was given to her.     I called the patient to let her know that I received her voice mail. She denies anymore increased redness, pain, or swelling.     I let the patient know that I will confirm with Gustabo on if we need to bring her in to be evaluated on 08/30/23.     I let her know that I will call her back with a response.     She verbalized a clear understanding.     (See hosp not from 08/27/23, has imaging of knee)

## 2023-08-30 NOTE — TELEPHONE ENCOUNTER
The patient sent me imaging of the right knee incision this morning through e-mail. I downloaded the imaging into her chart and let her know that we received the imaging.

## 2023-09-01 ENCOUNTER — HOSPITAL ENCOUNTER (OUTPATIENT)
Dept: RADIOLOGY | Facility: CLINIC | Age: 73
Discharge: HOME | End: 2023-09-01
Attending: PHYSICIAN ASSISTANT
Payer: MEDICARE

## 2023-09-01 ENCOUNTER — OFFICE VISIT (OUTPATIENT)
Dept: ORTHOPEDICS | Facility: CLINIC | Age: 73
End: 2023-09-01
Payer: MEDICARE

## 2023-09-01 VITALS
WEIGHT: 174 LBS | SYSTOLIC BLOOD PRESSURE: 151 MMHG | HEART RATE: 5 BPM | HEIGHT: 61 IN | BODY MASS INDEX: 32.85 KG/M2 | DIASTOLIC BLOOD PRESSURE: 80 MMHG

## 2023-09-01 DIAGNOSIS — Z47.1 AFTERCARE FOLLOWING RIGHT KNEE JOINT REPLACEMENT SURGERY: ICD-10-CM

## 2023-09-01 DIAGNOSIS — Z96.651 AFTERCARE FOLLOWING RIGHT KNEE JOINT REPLACEMENT SURGERY: ICD-10-CM

## 2023-09-01 DIAGNOSIS — Z96.651 AFTERCARE FOLLOWING RIGHT KNEE JOINT REPLACEMENT SURGERY: Primary | ICD-10-CM

## 2023-09-01 DIAGNOSIS — Z47.1 AFTERCARE FOLLOWING RIGHT KNEE JOINT REPLACEMENT SURGERY: Primary | ICD-10-CM

## 2023-09-01 PROCEDURE — 73562 XR KNEE 3 VIEW RIGHT: ICD-10-PCS | Mod: RT,,, | Performed by: PHYSICIAN ASSISTANT

## 2023-09-01 PROCEDURE — 73562 X-RAY EXAM OF KNEE 3: CPT | Mod: RT,,, | Performed by: PHYSICIAN ASSISTANT

## 2023-09-01 PROCEDURE — 99024 POSTOP FOLLOW-UP VISIT: CPT | Mod: POP,,, | Performed by: PHYSICIAN ASSISTANT

## 2023-09-01 PROCEDURE — 99024 PR POST-OP FOLLOW-UP VISIT: ICD-10-PCS | Mod: POP,,, | Performed by: PHYSICIAN ASSISTANT

## 2023-09-01 RX ORDER — HYDROCODONE BITARTRATE AND ACETAMINOPHEN 5; 325 MG/1; MG/1
1 TABLET ORAL EVERY 8 HOURS PRN
Qty: 12 TABLET | Refills: 0 | Status: SHIPPED | OUTPATIENT
Start: 2023-09-01 | End: 2023-09-08

## 2023-09-01 RX ORDER — HYDROCODONE BITARTRATE AND ACETAMINOPHEN 5; 325 MG/1; MG/1
1 TABLET ORAL EVERY 6 HOURS PRN
Status: ON HOLD | COMMUNITY
End: 2024-01-04

## 2023-09-01 RX ORDER — KETOROLAC TROMETHAMINE 10 MG/1
10 TABLET, FILM COATED ORAL EVERY 6 HOURS
Status: ON HOLD | COMMUNITY
End: 2024-01-04 | Stop reason: CLARIF

## 2023-09-01 NOTE — PROGRESS NOTES
Chief Complaint:   Chief Complaint   Patient presents with    Post-op Evaluation     2 wk s/p R TKA 8/17/23-11/15/23 states she has been doing good. reports that her incision is clean. states she has been attending PT at least 3 times a week. ambulating with a walker today. states she has been feeling nerve pain at the back of the leg.        History of present illness:    This is a 72 y.o. year old female who complains of right postop total knee arthroplasty  Patient was doing well without difficulty.    She did go to the ER for some concerns about her wound but there is no signs of infection at that time      Current Outpatient Medications   Medication Sig    amLODIPine (NORVASC) 5 MG tablet Take 1 tablet (5 mg total) by mouth every evening.    aspirin (ECOTRIN) 81 MG EC tablet Take 1 tablet (81 mg total) by mouth every 12 (twelve) hours. Blood clot prevention    B-complex with vitamin C (Z-BEC OR EQUIV) tablet Take 1 tablet by mouth once daily.    cephALEXin (KEFLEX) 500 MG capsule Take 1 capsule (500 mg total) by mouth every 12 (twelve) hours. for 10 days    cholecalciferol, vitamin D3, 125 mcg (5,000 unit) capsule Take 5,000 Units by mouth once daily.    HYDROcodone-acetaminophen (NORCO) 5-325 mg per tablet Take 1 tablet by mouth every 6 (six) hours as needed for Pain.    ketorolac (TORADOL) 10 mg tablet Take 10 mg by mouth every 6 (six) hours.    lisdexamfetamine (VYVANSE) 70 MG capsule Take 70 mg by mouth every morning.    metoprolol tartrate (LOPRESSOR) 50 MG tablet Take 75 mg by mouth once daily at 6am.    mupirocin (BACTROBAN) 2 % ointment Apply topically 3 (three) times daily.    pantoprazole (PROTONIX) 40 MG tablet 40 mg once daily.    polyethylene glycol (GLYCOLAX) 17 gram PwPk Take 17 g by mouth once daily. for 14 days    polysaccharide iron complex 200 mg iron Cap Take 1 capsule by mouth Daily.    pramipexole (MIRAPEX) 1.5 MG tablet Take 1.5 mg by mouth 2 (two) times a day.     "telmisartan-hydrochlorothiazide (MICARDIS HCT) 40-12.5 mg per tablet Take 1 tablet by mouth once daily.    vitamin B complex (B COMPLEX 1 ORAL) Take 1 tablet by mouth once daily.    methocarbamoL (ROBAXIN) 750 MG Tab Take 1 tablet (750 mg total) by mouth every 6 (six) hours. for 14 days (Patient not taking: Reported on 9/1/2023)     No current facility-administered medications for this visit.       Review of Systems:    Constitution:   Denies chills, fever, and sweats.  HENT:   Denies headaches or blurry vision.  Cardiovascular:  Denies chest pain or irregular heart beat.  Respiratory:   Denies cough or shortness of breath.  Gastrointestinal:  Denies abdominal pain, nausea, or vomiting.  Musculoskeletal:   Denies muscle cramps.  Neurological:   Denies dizziness or focal weakness.  Psychiatric/Behavior: Normal mental status.  Hematology/Lymph:  Denies bleeding problem or easy bruising/bleeding.  Skin:    Denies rash or suspicious lesions.    Examination:    Vital Signs:    Vitals:    09/01/23 0822   BP: (!) 151/80   Pulse: (!) 5   Weight: 78.9 kg (174 lb)   Height: 5' 1" (1.549 m)   PainSc:   3       Body mass index is 32.88 kg/m².    Constitution:   Well-developed, well nourished patient in no acute distress.  Neurological:   Alert and oriented x 3 and cooperative to examination.     Psychiatric/Behavior: Normal mental status.  Respiratory:   No shortness of breath.  Eyes:    Extraoccular muscles intact  Skin:    No scars, rash or suspicious lesions.    Physical Exam:   Right Knee     Mild effusion, no redness    Surgical incision C/D/I with staples   Active flexion 90 degrees   Active extension 0 degrees    Thigh and calf compartments soft and compressible    NVI distally Weakness of the knee was observed.    X-Ray Knee:   Three views of the right knee were performed   IMPRESSIONS RADIOLOGY TEST     X-ray of knee was performed intact  knee implant           Assessment: Aftercare following right knee joint " replacement surgery  -     X-Ray Knee 3 View Right; Future; Expected date: 09/01/2023         Plan:  The patient has staples removed and Steri-Strips applied.    She is going to continue with her physical therapy.    She will work on some elevation and use for compression socks to reduce the edema in her lower leg.    She will follow up with Dr. Barone in 6 weeks for repeat evaluation.    Wound care and dressing change instructions discussed with the patient          DISCLAIMER: This note may have been dictated using voice recognition software and may contain grammatical errors.     NOTE: Consult report sent to referring provider via [employer] EMR.

## 2023-09-05 DIAGNOSIS — Z96.651 S/P TOTAL KNEE ARTHROPLASTY, RIGHT: Primary | ICD-10-CM

## 2023-09-05 NOTE — PROGRESS NOTES
Subjective:       Patient ID: Amita Trejo is a 72 y.o. female.    Chief Complaint: Results      History of Present Illness  Chief complaint: SLL Lugano II    HPI: 72 y/o F w/ PMHx of ELI on CPAP, mitral valve prolapse, HTN, ADHD, neuropathy, DJD referred to [facility] for newly diagnosed stage II SLL    She had routine screening MMG 2/2021 that showed some mildly enlarged b/l axillary nodes. She had b/l axillary US with Dr Lugo 3/2021 most suggestive of inflammation. Repeat US of right axilla obtained 6/2021 that showed some borderline enlarged nodes with benign sonographic appearance. She had right axillary LN biopsy showing SLL/CLL    Colonoscopy Dr Kwan 2016, negative per pt      Interval history    9/6/2023:  Mrs Trejo is here today for her four-month follow-up for her Small Lymphocytic Lymphoma.  She denies any recent fevers, chills, infections, night sweats, weight loss, lymphadenopathy of the neck, axillary, or groin regions.  She had a right total knee replacement on August 17, 2023, and is currently walking with a walker and had PT today.  Labs reviewed with patient.  Labs dated 08/30/2023:  Na+ 133, BUN 23.40, creatinine .84, , Uric acid 5.7, WBC 12.46, Hgb 9.8, Hct 30.5, MCV 93.6, platelets 256,000, Lymphs 6.55.  At the end of the office visit, she reported diaphoresis, lightheadedness, and needing water.  Physical assessment revealed:  BP 63/43 down from initial arrival BP of 116/69, 100% O2 saturation on RA, heart rate 73 and regular, blood glucose 123.  Patient had two blood pressure medications this am Norvasc and Metoprolol.  She was placed in a wheelchair and taken to the ED at [facility] for further evaluation.       PMHx: ELI on CPAP, mitral valve prolapse, HTN, ADHD, neuropathy, DJD  PSHx: tonsillectomy, sinus surgery x2, left breast lumpectomy (benign), torn meniscus right x2, thyroglossal duct cyst, right shoulder rotator cuff, back surgery  Social Hx: former smoker  quit in 1986, 10 pack year, no ETOH, no drugs  Family Hx: sister: DCIS and melanoma, paternal grandfather: head and neck cancer (not a smoker)  Meds: reviewed  Allergies: ativan, cymbalta    Labs:  04/25/2023: CMP unremarkable, WBC count 14.9, hemoglobin 12.1, MCV 94.7, ANC 3.86.  03/08/2023:  WBC count 24.2, hemoglobin 13.3, MCV 94, platelet count 259.  7/6/22 Cr 0.88, Alb 3.8, TP 7.1, , Uric acid 5.5, WBC 10.61, Hgb 10.5, , ANC 2.90, ALC 6.90  3/3/22 WBC 13.5 RBC 3.74 Hg 11.2 MCV 94 rdw 13.8  ANC 3.4 ALC 8.9 Cr 1.12 Alb 4.4 Ca 9.4   12/8/21 Cr 0.86, Alb 3.8, TP 7.0, Ca 9.7, AlkPhos 103, , Uric acid 5.9, WBC 13.21, Hgb 11.4, , Retic 1.43, ANC 4.41, ALC 7.73  9/21/21 Cr 1.16 Alb 3.9 TP 7.4 Ca 9.9 AlkPhos 92 AST 21 ALT 12  ferritin 122 WBC 9.82 Hg 11.1  ANC 3.35 ALC 4.91  7/21/21  Ferritin 138 Hep B core ab neg Hep B s ag neg Hep B s ab neg Hep C neg Uric acid 6.7 WBC 8.78 Hg 12.2  ANC 2.78 ALC 5.14 Direct Joao neg B2 micro 4.2 SPEP w/ ANGIE IgG 930 IgA 467 IgM 43 Abs kappa 43 Abs lambda 27.8 SFLC ratio 1.55 no M spike  1/18/20 Cr 0.89 Alb 3.9 TP 7.1 WBC 8.46 Hg 12.9 MCV 90.9  ANC 3.18 ALC 4.25    Imaging:  3/29/22 MMG prominent nodes in each axilla. Some nodes have minimally increased in size.     6/14/21 US axilla unilateral: 6 right axillary nodes, 2 are borderline enlarged measuring 2.4x1.7x1.3cm and 2.3x1.3x1.1cm. Benign sonographic appearance    2/18/21 CT N/C/A/P w/ contrast: no cervical adenopathy. Small thyroid nodules. No acute pulm disease. Small focus of nodular pleural thickening in fissure of left lung stable since 2019. Increased size of several borderline enlarged axillary LNs b/l in comparison to 2019 although this finding is nonspecific. Simple renal cysts on both kidneys increased in size compared to 2018 but not suspicious for malignancy    2/5/21 screening MMG: [id number]. Small LN in upper and outer quadrant of left  breast increased slightly in size from prior exams. Multiple LN within each axilla larger and more dense than on prior exam. Lymphoma should be considered    Path:  6/16/21 right axillary LN biopsy: atypical lymphoproliferative process consistent with involvement by CLL/SLL.  Flow: abnormal B cells 82% of lymphs, CD19+, CD20+ dim, CD5+, CD23+ variable, CD11c partial+, CD38-, lambda +dim  FISH: del13q/-13 detected. del17p, t(11:14), trisomy 12, del11q and del6q negative.    Review of Systems  CONSTITUTIONAL: no fevers, no chills,no weight loss, no fatigue, no weakness  HEMATOLOGIC: no abnormal bleeding, no abnormal bruising, no drenching night sweats  ONCOLOGIC: no new masses or lumps  HEENT: no vision loss, no tinnitus or hearing loss, no nose bleeding, no dysphagia, no odynophagia  CVS: no chest pain, no palpitations, no dyspnea on exertion  RESP: no shortness of breath, no hemoptysis, no cough  BREAST: no nipple discharge, no breast tenderness, no breast masses on self breast examination  GI: no nausea, no vomiting, no diarrhea, no constipation, no melena, no hematochezia, no hematemesis, no abdominal pain, no increase in abdominal girth  : no dysuria, no hematuria, no discharge  GYN: no abnormal vaginal bleeding, no dyspareunia, no vaginal discharge  INTEGUMENT: no rashes, no abnormal bruising, no nail pitting, no hyperpigmentation  NEURO: no falls, no memory loss, no paresthesias or dysesthesias, no urofecal incontinence or retention, no loss of strength on any extremity  MSK: +chronic lower back pain, no new joint pain but chronic b/l knee pain, Right knee replacement on 8/17/2023 with right knee swelling.  PSYCH: no suicidal or homicidal ideation, no depression, no insomnia, no anhedonia  ENDOCRINE: no heat or cold intolerance, no polyuria, no polydipsia        Objective:      Physical Exam  Vitals:    09/06/23 1006   BP: 116/69   Pulse: 74   Resp: 20   Temp: 97.6 °F (36.4 °C)          ECOG PS 1  GA: AAOx3,  NAD  HEENT: NCAT, PERRLA, EOMI, good dentition, no oral ulcers. Tonsils surgically removed  LYMPH: no cervical, inguinal or supraclavicular adenopathy. Small b/l axillary LNs, largest about 2cm in right axilla, others around 1cm  CVS: s1s2 RRR, no M/R/G  RESP: CTA b/l, no crackles, no wheezes or rhonchi  ABD: soft, NT, ND, BS+, no hepatosplenomegaly  EXT: no deformities, b/l pitting pedal edema  SKIN: no rashes, no bruises or purpura, warm and dry  NEURO: normal mentation, strength, no sensory deficits    Assessment:       Problem List Items Addressed This Visit          Oncology    Lymphoma, small lymphocytic - Primary   9/6/2023:  Patient subjective assessment regarding her SLL is negative.  Right knee replacement on 8/17/2023.  At the end of the office visit, she reported diaphoresis, lightheadedness, and needing water.  Physical assessment revealed:  BP 63/43 down from initial arrival BP of 116/69, 100% O2 saturation on RA, heart rate 73 and regular, blood glucose 123.  Patient had two blood pressure medications this am Norvasc and Metoprolol.  She was placed in a wheelchair and taken to the ED at [facility] for further evaluation.           Plan:     1. Lymphoma, small lymphocytic C83.00   Findings are most consistent with SLL Lugano stage II vs CLL Juarez I. She does likely have peripheral blood involvement as well  In any case, no anemia, no thrombocytopenia, no B symptoms, only 2 narciso stations involved (b/l axilla) and 13q del which confers good prognosis. No IGHV available but I do not think this is necessary at this time  Had systemic imaging with CT N/C/A/P w/ IV contrast 2/2021 without any bulky disease  No further workup needed at this time. Will observe every 12-16 weeks. If any indication arises, then she will also need PET CT and bone marrow biopsy      9/6/2023 Plan:   Reviewed labs, noted stable WBC count.  CBC, CMP, LDH, uric acid in 4 months   Follow-up in 4 months with above workup  to discuss further recommendations.      The patient is in agreement with today's plan of care.  All questions answered.  Patient is aware to contact our office for any problems or concerns prior to next visit.      Celia Santa, MSN-ED, APRN, AGACNP-BC, OCN

## 2023-09-06 ENCOUNTER — OFFICE VISIT (OUTPATIENT)
Dept: HEMATOLOGY/ONCOLOGY | Facility: CLINIC | Age: 73
End: 2023-09-06
Payer: MEDICARE

## 2023-09-06 VITALS
RESPIRATION RATE: 20 BRPM | SYSTOLIC BLOOD PRESSURE: 116 MMHG | HEART RATE: 74 BPM | TEMPERATURE: 98 F | HEIGHT: 61 IN | BODY MASS INDEX: 31.98 KG/M2 | DIASTOLIC BLOOD PRESSURE: 69 MMHG | WEIGHT: 169.38 LBS | OXYGEN SATURATION: 98 %

## 2023-09-06 DIAGNOSIS — C83.00 LYMPHOMA, SMALL LYMPHOCYTIC: Primary | ICD-10-CM

## 2023-09-06 PROCEDURE — 99214 PR OFFICE/OUTPT VISIT, EST, LEVL IV, 30-39 MIN: ICD-10-PCS | Mod: ,,, | Performed by: NURSE PRACTITIONER

## 2023-09-06 PROCEDURE — 99214 OFFICE O/P EST MOD 30 MIN: CPT | Mod: ,,, | Performed by: NURSE PRACTITIONER

## 2023-09-07 ENCOUNTER — TELEPHONE (OUTPATIENT)
Dept: ORTHOPEDICS | Facility: CLINIC | Age: 73
End: 2023-09-07
Payer: MEDICARE

## 2023-09-07 NOTE — TELEPHONE ENCOUNTER
Patient called stating that she is having left hip pain and would like to have a cortisone injection.     I called the patient back and let her know that I received her voice mail.     I made her an appointment to receive a cortisone injection with Gustabo on 09/20/23.     She verbalized a clear understanding.   
impaired

## 2023-09-20 ENCOUNTER — OFFICE VISIT (OUTPATIENT)
Dept: ORTHOPEDICS | Facility: CLINIC | Age: 73
End: 2023-09-20
Payer: MEDICARE

## 2023-09-20 ENCOUNTER — HOSPITAL ENCOUNTER (OUTPATIENT)
Dept: RADIOLOGY | Facility: CLINIC | Age: 73
Discharge: HOME | End: 2023-09-20
Attending: PHYSICIAN ASSISTANT
Payer: MEDICARE

## 2023-09-20 VITALS
HEART RATE: 84 BPM | HEIGHT: 61 IN | WEIGHT: 169 LBS | BODY MASS INDEX: 31.91 KG/M2 | SYSTOLIC BLOOD PRESSURE: 152 MMHG | DIASTOLIC BLOOD PRESSURE: 70 MMHG

## 2023-09-20 DIAGNOSIS — M25.552 LEFT HIP PAIN: ICD-10-CM

## 2023-09-20 DIAGNOSIS — M70.62 GREATER TROCHANTERIC BURSITIS OF LEFT HIP: Primary | ICD-10-CM

## 2023-09-20 DIAGNOSIS — M16.12 PRIMARY OSTEOARTHRITIS OF LEFT HIP: ICD-10-CM

## 2023-09-20 PROCEDURE — 99214 PR OFFICE/OUTPT VISIT, EST, LEVL IV, 30-39 MIN: ICD-10-PCS | Mod: 25,24,, | Performed by: PHYSICIAN ASSISTANT

## 2023-09-20 PROCEDURE — 20610 LARGE JOINT ASPIRATION/INJECTION: L GREATER TROCHANTERIC BURSA: ICD-10-PCS | Mod: 79,LT,, | Performed by: PHYSICIAN ASSISTANT

## 2023-09-20 PROCEDURE — 73502 XR HIP WITH PELVIS WHEN PERFORMED, 2 OR 3 VIEWS LEFT: ICD-10-PCS | Mod: LT,,, | Performed by: PHYSICIAN ASSISTANT

## 2023-09-20 PROCEDURE — 20610 DRAIN/INJ JOINT/BURSA W/O US: CPT | Mod: 79,LT,, | Performed by: PHYSICIAN ASSISTANT

## 2023-09-20 PROCEDURE — 73502 X-RAY EXAM HIP UNI 2-3 VIEWS: CPT | Mod: LT,,, | Performed by: PHYSICIAN ASSISTANT

## 2023-09-20 PROCEDURE — 99214 OFFICE O/P EST MOD 30 MIN: CPT | Mod: 25,24,, | Performed by: PHYSICIAN ASSISTANT

## 2023-09-20 RX ORDER — BETAMETHASONE SODIUM PHOSPHATE AND BETAMETHASONE ACETATE 3; 3 MG/ML; MG/ML
12 INJECTION, SUSPENSION INTRA-ARTICULAR; INTRALESIONAL; INTRAMUSCULAR; SOFT TISSUE
Status: DISCONTINUED | OUTPATIENT
Start: 2023-09-20 | End: 2023-09-20 | Stop reason: HOSPADM

## 2023-09-20 RX ORDER — LIDOCAINE HYDROCHLORIDE 20 MG/ML
2 INJECTION, SOLUTION INFILTRATION; PERINEURAL
Status: DISCONTINUED | OUTPATIENT
Start: 2023-09-20 | End: 2023-09-20 | Stop reason: HOSPADM

## 2023-09-20 RX ORDER — LIDOCAINE HYDROCHLORIDE 20 MG/ML
5 INJECTION, SOLUTION EPIDURAL; INFILTRATION; INTRACAUDAL; PERINEURAL
Status: DISCONTINUED | OUTPATIENT
Start: 2023-09-20 | End: 2023-09-20 | Stop reason: HOSPADM

## 2023-09-20 RX ADMIN — BETAMETHASONE SODIUM PHOSPHATE AND BETAMETHASONE ACETATE 12 MG: 3; 3 INJECTION, SUSPENSION INTRA-ARTICULAR; INTRALESIONAL; INTRAMUSCULAR; SOFT TISSUE at 03:09

## 2023-09-20 RX ADMIN — LIDOCAINE HYDROCHLORIDE 2 ML: 20 INJECTION, SOLUTION INFILTRATION; PERINEURAL at 03:09

## 2023-09-20 RX ADMIN — LIDOCAINE HYDROCHLORIDE 5 ML: 20 INJECTION, SOLUTION EPIDURAL; INFILTRATION; INTRACAUDAL; PERINEURAL at 03:09

## 2023-09-20 NOTE — PROCEDURES
Large Joint Aspiration/Injection: L greater trochanteric bursa    Date/Time: 9/20/2023 3:30 PM    Performed by: Gustabo Rosenthal PA  Authorized by: Gustabo Rosenthal PA    Consent Done?:  Yes (Verbal)  Indications:  Pain  Site marked: the procedure site was marked    Timeout: prior to procedure the correct patient, procedure, and site was verified    Prep: patient was prepped and draped in usual sterile fashion    Approach:  Lateral  Location:  Hip  Site:  L greater trochanteric bursa  Medications:  5 mL LIDOcaine (PF) 20 mg/mL (2%) 20 mg/mL (2 %); 12 mg betamethasone acetate-betamethasone sodium phosphate 6 mg/mL; 2 mL LIDOcaine HCL 20 mg/ml (2%) 20 mg/mL (2 %)  Patient tolerance:  Patient tolerated the procedure well with no immediate complications

## 2023-09-20 NOTE — PROGRESS NOTES
Chief Complaint:   Chief Complaint   Patient presents with    Left Hip - Pain     LT hip pain that started a few months ago and getting worse. No prior sx. Tried tylenol/ibuprofen OTC but no relief. Requesting cortisone injection today.        History of present illness:    73-year-old female presents office today for cortisone injection to the left hip.  She has a known history of osteoarthritis with trochanteric bursitis.  She is had increasing lateral-sided hip pain.  She has had injections in the past which have helped.  She is recovering from a right total knee arthroplasty 6 weeks ago.  She is ambulating with a cane    Past Medical History:   Diagnosis Date    Acid reflux     Arthritis     chronic lymphocytic leukemia     Hypertension     Renal cyst     Sleep apnea        Past Surgical History:   Procedure Laterality Date    BREAST SURGERY Left 1991?    lumpectomy, NO Restrictions    CARPAL TUNNEL RELEASE      CYST REMOVAL      EPIDURAL STEROID INJECTION      JOINT REPLACEMENT  07/21    Left knee    KNEE JOINT MANIPULATION Left 08/29/2022    Procedure: MANIPULATION, KNEE;  Surgeon: Sohan Bowens MD;  Location: [facility] OR;  Service: Orthopedics;  Laterality: Left;    REVISION OF KNEE ARTHROPLASTY Left 06/08/2022    Procedure: REVISION, ARTHROPLASTY, KNEE;  Surgeon: Sohan Bowens MD;  Location: [facility] OR;  Service: Orthopedics;  Laterality: Left;  FEMUR / PATHOLOGY / ASHLEY    ROBOTIC ARTHROPLASTY, KNEE Right 08/17/2023    Procedure: ROBOTIC ARTHROPLASTY, KNEE, TOTAL;  Surgeon: Isidro Barone MD;  Location: [facility] OR;  Service: Orthopedics;  Laterality: Right;    SHOULDER SURGERY Right     SINUS SURGERY      x 2    SPINE SURGERY  05/21    Bulging disc in lower back    THYROID SURGERY      TONSILLECTOMY      TOTAL KNEE ARTHROPLASTY  08/17/2023       Current Outpatient Medications   Medication Sig    amLODIPine (NORVASC) 5 MG tablet Take 1 tablet (5 mg total) by mouth every evening.    aspirin (ECOTRIN) 81 MG EC  tablet Take 1 tablet (81 mg total) by mouth every 12 (twelve) hours. Blood clot prevention    B-complex with vitamin C (Z-BEC OR EQUIV) tablet Take 1 tablet by mouth once daily.    cholecalciferol, vitamin D3, 125 mcg (5,000 unit) capsule Take 5,000 Units by mouth once daily.    ketorolac (TORADOL) 10 mg tablet Take 10 mg by mouth every 6 (six) hours.    lisdexamfetamine (VYVANSE) 70 MG capsule Take 70 mg by mouth every morning.    metoprolol tartrate (LOPRESSOR) 50 MG tablet Take 75 mg by mouth once daily at 6am.    mupirocin (BACTROBAN) 2 % ointment Apply topically 3 (three) times daily.    pantoprazole (PROTONIX) 40 MG tablet 40 mg once daily.    polysaccharide iron complex 200 mg iron Cap Take 1 capsule by mouth Daily.    pramipexole (MIRAPEX) 1.5 MG tablet Take 1.5 mg by mouth 2 (two) times a day.    telmisartan-hydrochlorothiazide (MICARDIS HCT) 40-12.5 mg per tablet Take 1 tablet by mouth once daily.    vitamin B complex (B COMPLEX 1 ORAL) Take 1 tablet by mouth once daily.    HYDROcodone-acetaminophen (NORCO) 5-325 mg per tablet Take 1 tablet by mouth every 6 (six) hours as needed for Pain.     No current facility-administered medications for this visit.       Review of patient's allergies indicates:   Allergen Reactions    Duloxetine      Other reaction(s): Bradycardia, Hypotension, Vomit    Lorazepam      Other reaction(s): Confusion  amnesia      Tramadol Other (See Comments)     nightmares       Family History   Problem Relation Age of Onset    Hypertension Mother     COPD Mother     Hearing loss Mother     Hypertension Father     COPD Father     Hearing loss Father     Breast cancer Sister     Cancer Sister     Diabetes Maternal Grandmother         Reina    Diabetes Paternal Grandmother        Social History     Socioeconomic History    Marital status:    Tobacco Use    Smoking status: Former     Current packs/day: 0.00     Average packs/day: 1 pack/day for 10.0 years (10.0 ttl pk-yrs)      "Types: Cigarettes     Start date: 3/18/1976     Quit date: 3/18/1986     Years since quittin.5    Smokeless tobacco: Former   Substance and Sexual Activity    Alcohol use: Not Currently    Drug use: Never    Sexual activity: Yes     Partners: Male     Birth control/protection: Post-menopausal         Review of Systems:    Constitution:   Denies chills, fever, and sweats.  HENT:   Denies headaches or blurry vision.  Cardiovascular:  Denies chest pain or irregular heart beat.  Respiratory:   Denies cough or shortness of breath.  Gastrointestinal:  Denies abdominal pain, nausea, or vomiting.  Musculoskeletal:   Denies muscle cramps.  Neurological:   Denies dizziness or focal weakness.  Psychiatric/Behavior: Normal mental status.  Hematology/Lymph:  Denies bleeding problem or easy bruising/bleeding.  Skin:    Denies rash or suspicious lesions.    Examination:    Vital Signs:    Vitals:    23 1544   BP: (!) 152/70   Pulse: 84   Weight: 76.7 kg (169 lb)   Height: 5' 1" (1.549 m)   PainSc:   6       Body mass index is 31.93 kg/m².    Constitution:   Well-developed, well nourished patient in no acute distress.  Neurological:   Alert and oriented x 3 and cooperative to examination.     Psychiatric/Behavior: Normal mental status.  Respiratory:   No shortness of breath.  Nonlabored breathing  Cardiovascular:           Regular rate and rhythm  Eyes:    Extraoccular muscles intact  Skin:    No scars, rash or suspicious lesions.    Physical Exam:     Left hip exam     No signs of edema, erythema, or induration.    Tender over the greater trochanteric region.    Pain with internal and external rotation    Passive hip abduction 30 degrees   Passive hip flexion 80 degrees   Passive internal rotation 5 degrees   Passive external rotation 25 degrees   No weakness of the left hip was observed. An antalgic gait was observed. limping was noted             Assessment:     Greater trochanteric bursitis of left hip  -     Large " Joint Aspiration/Injection: L greater trochanteric bursa    Left hip pain  -     X-Ray Hip 2 or 3 views Left (with Pelvis when performed); Future; Expected date: 09/20/2023    Primary osteoarthritis of left hip        Plan:      Corticosteroid injection to left hip bursa today.  She does have advanced osteoarthritis and would benefit from total hip arthroplasty in the future.  She is recovering from her total knee arthroplasty.  She will continue with physical therapy and follow up with her next previously scheduled appointment        No follow-ups on file.    DISCLAIMER: This note may have been dictated using voice recognition software and may contain grammatical errors.

## 2023-10-30 ENCOUNTER — OFFICE VISIT (OUTPATIENT)
Dept: ORTHOPEDICS | Facility: CLINIC | Age: 73
End: 2023-10-30
Payer: MEDICARE

## 2023-10-30 VITALS
HEART RATE: 71 BPM | SYSTOLIC BLOOD PRESSURE: 134 MMHG | DIASTOLIC BLOOD PRESSURE: 61 MMHG | WEIGHT: 167 LBS | BODY MASS INDEX: 31.53 KG/M2 | HEIGHT: 61 IN

## 2023-10-30 DIAGNOSIS — M16.12 PRIMARY OSTEOARTHRITIS OF LEFT HIP: Primary | ICD-10-CM

## 2023-10-30 DIAGNOSIS — Z96.651 AFTERCARE FOLLOWING RIGHT KNEE JOINT REPLACEMENT SURGERY: ICD-10-CM

## 2023-10-30 DIAGNOSIS — Z47.1 AFTERCARE FOLLOWING RIGHT KNEE JOINT REPLACEMENT SURGERY: ICD-10-CM

## 2023-10-30 PROCEDURE — 99024 POSTOP FOLLOW-UP VISIT: CPT | Mod: POP,,, | Performed by: PHYSICIAN ASSISTANT

## 2023-10-30 PROCEDURE — 99024 PR POST-OP FOLLOW-UP VISIT: ICD-10-PCS | Mod: POP,,, | Performed by: PHYSICIAN ASSISTANT

## 2023-10-30 NOTE — PROGRESS NOTES
Chief Complaint:   Chief Complaint   Patient presents with    Right Knee - Follow-up     Pt states she is doing very well after her surgery. Pt is done with PT, and states she barely has issues with her knee. Pt states that today she has been experiencing discomfort and pain in her Lt hip. Pt states the last injection she had in her Lt bursa didn't help at all.       History of present illness:    73 year old female presents today for a 1 month f/u for her right knee.  She is nearly 3 months s/p right TKA and doing very well.  Her main complaint is her left hip pain.  Her pain is located in the groin and radiates to the lateral hip.  Her pain is constant and worse with weightbearing and activity.  She does have a known history of advanced osteoarthritis.  No relief with physical therapy, cortisone injections, activity modification.    Past Medical History:   Diagnosis Date    Acid reflux     Arthritis     chronic lymphocytic leukemia     Hypertension     Renal cyst     Sleep apnea        Past Surgical History:   Procedure Laterality Date    BREAST SURGERY Left 1991?    lumpectomy, NO Restrictions    CARPAL TUNNEL RELEASE      CYST REMOVAL      EPIDURAL STEROID INJECTION      JOINT REPLACEMENT  07/21    Left knee    KNEE JOINT MANIPULATION Left 08/29/2022    Procedure: MANIPULATION, KNEE;  Surgeon: Sohan Bowens MD;  Location: [facility] OR;  Service: Orthopedics;  Laterality: Left;    REVISION OF KNEE ARTHROPLASTY Left 06/08/2022    Procedure: REVISION, ARTHROPLASTY, KNEE;  Surgeon: Sohan Bowens MD;  Location: [facility] OR;  Service: Orthopedics;  Laterality: Left;  FEMUR / PATHOLOGY / ASHLEY    ROBOTIC ARTHROPLASTY, KNEE Right 08/17/2023    Procedure: ROBOTIC ARTHROPLASTY, KNEE, TOTAL;  Surgeon: Isidro Barone MD;  Location: [facility] OR;  Service: Orthopedics;  Laterality: Right;    SHOULDER SURGERY Right     SINUS SURGERY      x 2    SPINE SURGERY  05/21    Bulging disc in lower back    THYROID SURGERY       TONSILLECTOMY      TOTAL KNEE ARTHROPLASTY  08/17/2023       Current Outpatient Medications   Medication Sig    amLODIPine (NORVASC) 5 MG tablet Take 1 tablet (5 mg total) by mouth every evening.    B-complex with vitamin C (Z-BEC OR EQUIV) tablet Take 1 tablet by mouth once daily.    cholecalciferol, vitamin D3, 125 mcg (5,000 unit) capsule Take 5,000 Units by mouth once daily.    HYDROcodone-acetaminophen (NORCO) 5-325 mg per tablet Take 1 tablet by mouth every 6 (six) hours as needed for Pain.    ketorolac (TORADOL) 10 mg tablet Take 10 mg by mouth every 6 (six) hours.    lisdexamfetamine (VYVANSE) 70 MG capsule Take 70 mg by mouth every morning.    metoprolol tartrate (LOPRESSOR) 50 MG tablet Take 75 mg by mouth once daily at 6am.    mupirocin (BACTROBAN) 2 % ointment Apply topically 3 (three) times daily.    pantoprazole (PROTONIX) 40 MG tablet 40 mg once daily.    polysaccharide iron complex 200 mg iron Cap Take 1 capsule by mouth Daily.    pramipexole (MIRAPEX) 1.5 MG tablet Take 1.5 mg by mouth 2 (two) times a day.    telmisartan-hydrochlorothiazide (MICARDIS HCT) 40-12.5 mg per tablet Take 1 tablet by mouth once daily.    vitamin B complex (B COMPLEX 1 ORAL) Take 1 tablet by mouth once daily.    aspirin (ECOTRIN) 81 MG EC tablet Take 1 tablet (81 mg total) by mouth every 12 (twelve) hours. Blood clot prevention     No current facility-administered medications for this visit.       Review of patient's allergies indicates:   Allergen Reactions    Duloxetine      Other reaction(s): Bradycardia, Hypotension, Vomit    Lorazepam      Other reaction(s): Confusion  amnesia      Tramadol Other (See Comments)     nightmares       Family History   Problem Relation Age of Onset    Hypertension Mother     COPD Mother     Hearing loss Mother     Hypertension Father     COPD Father     Hearing loss Father     Breast cancer Sister     Cancer Sister     Diabetes Maternal Grandmother         Reina    Diabetes Paternal  "Grandmother        Social History     Socioeconomic History    Marital status:    Tobacco Use    Smoking status: Former     Current packs/day: 0.00     Average packs/day: 1 pack/day for 10.0 years (10.0 ttl pk-yrs)     Types: Cigarettes     Start date: 3/18/1976     Quit date: 3/18/1986     Years since quittin.6    Smokeless tobacco: Former   Substance and Sexual Activity    Alcohol use: Not Currently    Drug use: Never    Sexual activity: Yes     Partners: Male     Birth control/protection: Post-menopausal         Review of Systems:    Constitution:   Denies chills, fever, and sweats.  HENT:   Denies headaches or blurry vision.  Cardiovascular:  Denies chest pain or irregular heart beat.  Respiratory:   Denies cough or shortness of breath.  Gastrointestinal:  Denies abdominal pain, nausea, or vomiting.  Musculoskeletal:   Denies muscle cramps.  Neurological:   Denies dizziness or focal weakness.  Psychiatric/Behavior: Normal mental status.  Hematology/Lymph:  Denies bleeding problem or easy bruising/bleeding.  Skin:    Denies rash or suspicious lesions.    Examination:    Vital Signs:    Vitals:    10/30/23 1351   BP: 134/61   Pulse: 71   Weight: 75.8 kg (167 lb)   Height: 5' 1" (1.549 m)       Body mass index is 31.55 kg/m².    Constitution:   Well-developed, well nourished patient in no acute distress.  Neurological:   Alert and oriented x 3 and cooperative to examination.     Psychiatric/Behavior: Normal mental status.  Respiratory:   No shortness of breath.  Nonlabored breathing  Cardiovascular:           Regular rate and rhythm  Eyes:    Extraoccular muscles intact  Skin:    No scars, rash or suspicious lesions.    Physical Exam:     right Knee     No swelling, warmth    Well healed scar    No instability of the knee in mid or deep flexion     Active flexion 115 degrees     Active extension 0 degrees     No weakness observed.    left hip exam     No signs of edema, erythema, or " induration.    Tender over the greater trochanteric region.    Pain with internal and external rotation    Passive hip abduction 20 degrees   Passive hip flexion 90 degrees   Passive internal rotation 5 degrees   Passive external rotation 20 degrees   No weakness of the left hip was observed. An antalgic gait was observed. limping was noted     xrays    left hip x-ray with two or more views of the AP and lateral aspects were reviewed.   Impressions   Advanced Joint space narrowing, bone on bone with osteophytes arising from the hip joint and subchondral cysts seen        Assessment:     Aftercare following right knee joint replacement surgery    Primary osteoarthritis of left hip        Plan:      She will continue with low-impact exercising regarding the right knee.    Robotic assisted left total hip arthroplasty  We will get this scheduled before the end of the year    The proposed procedure as well as associated risks/benefits were discussed at length with the patient and family with risks to include pain, bleeding, infection, future surgeries, neurovascular compromise, loss of limb, heart attack, stroke, deep vein thrombosis, and even death. They understand and agree with the treatment plan.        No follow-ups on file.    DISCLAIMER: This note may have been dictated using voice recognition software and may contain grammatical errors.

## 2023-12-20 ENCOUNTER — OFFICE VISIT (OUTPATIENT)
Dept: ORTHOPEDICS | Facility: CLINIC | Age: 73
End: 2023-12-20
Payer: MEDICARE

## 2023-12-20 ENCOUNTER — HOSPITAL ENCOUNTER (OUTPATIENT)
Dept: RADIOLOGY | Facility: HOSPITAL | Age: 73
Discharge: HOME | End: 2023-12-20
Attending: PHYSICIAN ASSISTANT
Payer: MEDICARE

## 2023-12-20 VITALS
DIASTOLIC BLOOD PRESSURE: 77 MMHG | WEIGHT: 165 LBS | HEIGHT: 61 IN | HEART RATE: 105 BPM | SYSTOLIC BLOOD PRESSURE: 144 MMHG | BODY MASS INDEX: 31.15 KG/M2

## 2023-12-20 DIAGNOSIS — R26.89 IMPAIRED GAIT AND MOBILITY: ICD-10-CM

## 2023-12-20 DIAGNOSIS — Z01.812 PRE-OPERATIVE LABORATORY EXAMINATION: ICD-10-CM

## 2023-12-20 DIAGNOSIS — R79.9 ABNORMAL BLOOD CHEMISTRY: ICD-10-CM

## 2023-12-20 DIAGNOSIS — M16.12 PRIMARY OSTEOARTHRITIS OF LEFT HIP: Primary | ICD-10-CM

## 2023-12-20 DIAGNOSIS — I10 HYPERTENSION, UNSPECIFIED TYPE: ICD-10-CM

## 2023-12-20 DIAGNOSIS — M16.12 PRIMARY OSTEOARTHRITIS OF LEFT HIP: ICD-10-CM

## 2023-12-20 DIAGNOSIS — C91.10 CHRONIC LYMPHOCYTIC LEUKEMIA: ICD-10-CM

## 2023-12-20 LAB — MRSA PCR SCRN (OHS): DETECTED

## 2023-12-20 PROCEDURE — 71046 X-RAY EXAM CHEST 2 VIEWS: CPT | Mod: TC

## 2023-12-20 PROCEDURE — 99214 OFFICE O/P EST MOD 30 MIN: CPT | Mod: ,,, | Performed by: PHYSICIAN ASSISTANT

## 2023-12-20 PROCEDURE — 99214 PR OFFICE/OUTPT VISIT, EST, LEVL IV, 30-39 MIN: ICD-10-PCS | Mod: ,,, | Performed by: PHYSICIAN ASSISTANT

## 2023-12-20 PROCEDURE — 73700 CT LOWER EXTREMITY W/O DYE: CPT | Mod: TC,LT

## 2023-12-20 RX ORDER — ACETAMINOPHEN 500 MG
1000 TABLET ORAL
Status: CANCELLED | OUTPATIENT
Start: 2023-12-20

## 2023-12-20 RX ORDER — KETOROLAC TROMETHAMINE 10 MG/1
10 TABLET, FILM COATED ORAL
Status: CANCELLED | OUTPATIENT
Start: 2023-12-20 | End: 2023-12-20

## 2023-12-20 RX ORDER — GABAPENTIN 100 MG/1
300 CAPSULE ORAL
Status: CANCELLED | OUTPATIENT
Start: 2023-12-20

## 2023-12-20 RX ORDER — SCOLOPAMINE TRANSDERMAL SYSTEM 1 MG/1
1 PATCH, EXTENDED RELEASE TRANSDERMAL ONCE AS NEEDED
Status: CANCELLED | OUTPATIENT
Start: 2023-12-20 | End: 2035-05-17

## 2023-12-20 RX ORDER — ONDANSETRON 4 MG/1
4 TABLET, ORALLY DISINTEGRATING ORAL
Status: CANCELLED | OUTPATIENT
Start: 2023-12-20

## 2023-12-20 RX ORDER — TRANEXAMIC ACID 650 MG/1
1950 TABLET ORAL
Status: CANCELLED | OUTPATIENT
Start: 2023-12-20 | End: 2023-12-20

## 2023-12-20 RX ORDER — SODIUM CHLORIDE, SODIUM GLUCONATE, SODIUM ACETATE, POTASSIUM CHLORIDE AND MAGNESIUM CHLORIDE 30; 37; 368; 526; 502 MG/100ML; MG/100ML; MG/100ML; MG/100ML; MG/100ML
INJECTION, SOLUTION INTRAVENOUS CONTINUOUS
Status: CANCELLED | OUTPATIENT
Start: 2023-12-20

## 2023-12-20 NOTE — H&P
Chief Complaint:   No chief complaint on file.      History of present illness:    73 year old female presents today for preop evaluation for robotic assisted left total hip arthroplasty on January 4th.  No new complaints or concerns today.  Her pain is located in the groin and radiates to the lateral hip.  Her pain is constant and worse with weightbearing and activity.  She does have a known history of advanced osteoarthritis.  No relief with physical therapy, cortisone injections, activity modification.  She underwent total knee arthroplasty in August and has done very well with that.    Past Medical History:   Diagnosis Date    Acid reflux     Arthritis     chronic lymphocytic leukemia     Hypertension     Renal cyst     Sleep apnea        Past Surgical History:   Procedure Laterality Date    BREAST SURGERY Left 1991?    lumpectomy, NO Restrictions    CARPAL TUNNEL RELEASE      CYST REMOVAL      EPIDURAL STEROID INJECTION      JOINT REPLACEMENT  07/21    Left knee    KNEE JOINT MANIPULATION Left 08/29/2022    Procedure: MANIPULATION, KNEE;  Surgeon: Sohan Bowens MD;  Location: [facility] OR;  Service: Orthopedics;  Laterality: Left;    REVISION OF KNEE ARTHROPLASTY Left 06/08/2022    Procedure: REVISION, ARTHROPLASTY, KNEE;  Surgeon: Sohan Bowens MD;  Location: [facility] OR;  Service: Orthopedics;  Laterality: Left;  FEMUR / PATHOLOGY / ASHLEY    ROBOTIC ARTHROPLASTY, KNEE Right 08/17/2023    Procedure: ROBOTIC ARTHROPLASTY, KNEE, TOTAL;  Surgeon: Isidro Barone MD;  Location: [facility] OR;  Service: Orthopedics;  Laterality: Right;    SHOULDER SURGERY Right     SINUS SURGERY      x 2    SPINE SURGERY  05/21    Bulging disc in lower back    THYROID SURGERY      TONSILLECTOMY         Current Outpatient Medications   Medication Sig    amLODIPine (NORVASC) 5 MG tablet Take 1 tablet (5 mg total) by mouth every evening.    B-complex with vitamin C (Z-BEC OR EQUIV) tablet Take 1 tablet by mouth once daily.     cholecalciferol, vitamin D3, 125 mcg (5,000 unit) capsule Take 5,000 Units by mouth once daily.    lisdexamfetamine (VYVANSE) 30 MG capsule Take 30 mg by mouth every morning.    metoprolol tartrate (LOPRESSOR) 50 MG tablet Take 75 mg by mouth once daily at 6am.    pantoprazole (PROTONIX) 40 MG tablet 40 mg once daily.    polysaccharide iron complex 200 mg iron Cap Take 1 capsule by mouth Daily.    pramipexole (MIRAPEX) 1.5 MG tablet Take 1.5 mg by mouth 2 (two) times a day.    telmisartan-hydrochlorothiazide (MICARDIS HCT) 40-12.5 mg per tablet Take 1 tablet by mouth once daily.    vitamin B complex (B COMPLEX 1 ORAL) Take 1 tablet by mouth once daily.    aspirin (ECOTRIN) 81 MG EC tablet Take 1 tablet (81 mg total) by mouth every 12 (twelve) hours. Blood clot prevention    HYDROcodone-acetaminophen (NORCO) 5-325 mg per tablet Take 1 tablet by mouth every 6 (six) hours as needed for Pain.    ketorolac (TORADOL) 10 mg tablet Take 10 mg by mouth every 6 (six) hours.    mupirocin (BACTROBAN) 2 % ointment Apply topically 3 (three) times daily. (Patient not taking: Reported on 12/20/2023)     No current facility-administered medications for this visit.       Review of patient's allergies indicates:   Allergen Reactions    Duloxetine      Other reaction(s): Bradycardia, Hypotension, Vomit    Lorazepam      Other reaction(s): Confusion  amnesia      Tramadol Other (See Comments)     nightmares       Family History   Problem Relation Age of Onset    Hypertension Mother     COPD Mother     Hearing loss Mother     Hypertension Father     COPD Father     Hearing loss Father     Breast cancer Sister     Cancer Sister     Diabetes Maternal Grandmother         Reina    Diabetes Paternal Grandmother        Social History     Socioeconomic History    Marital status:    Tobacco Use    Smoking status: Former     Current packs/day: 0.00     Average packs/day: 1 pack/day for 10.0 years (10.0 ttl pk-yrs)     Types: Cigarettes  "    Start date: 3/18/1976     Quit date: 3/18/1986     Years since quittin.7    Smokeless tobacco: Former   Substance and Sexual Activity    Alcohol use: Not Currently    Drug use: Never    Sexual activity: Yes     Partners: Male     Birth control/protection: Post-menopausal         Review of Systems:    Constitution:   Denies chills, fever, and sweats.  HENT:   Denies headaches or blurry vision.  Cardiovascular:  Denies chest pain or irregular heart beat.  Respiratory:   Denies cough or shortness of breath.  Gastrointestinal:  Denies abdominal pain, nausea, or vomiting.  Musculoskeletal:   Denies muscle cramps.  Neurological:   Denies dizziness or focal weakness.  Psychiatric/Behavior: Normal mental status.  Hematology/Lymph:  Denies bleeding problem or easy bruising/bleeding.  Skin:    Denies rash or suspicious lesions.    Examination:    Vital Signs:    Vitals:    23 1308   BP: (!) 144/77   Pulse: 105   Weight: 74.8 kg (165 lb)   Height: 5' 1" (1.549 m)       Body mass index is 31.18 kg/m².    Constitution:   Well-developed, well nourished patient in no acute distress.  Neurological:   Alert and oriented x 3 and cooperative to examination.     Psychiatric/Behavior: Normal mental status.  Respiratory:   No shortness of breath.  Nonlabored breathing  Cardiovascular:           Regular rate and rhythm  Eyes:    Extraoccular muscles intact  Skin:    No scars, rash or suspicious lesions.    Physical Exam:       left hip exam     No signs of edema, erythema, or induration.    Tender over the greater trochanteric region.    Pain with internal and external rotation    Passive hip abduction 20 degrees   Passive hip flexion 90 degrees   Passive internal rotation 5 degrees   Passive external rotation 20 degrees   No weakness of the left hip was observed. An antalgic gait was observed. limping was noted     xrays    left hip x-ray with two or more views of the AP and lateral aspects were reviewed.   Impressions "   Advanced Joint space narrowing, bone on bone with osteophytes arising from the hip joint and subchondral cysts seen        Assessment:     Primary osteoarthritis of left hip    Hypertension, unspecified type    Impaired gait and mobility    Pre-operative laboratory examination    Chronic lymphocytic leukemia        Plan:      Robotic assisted left total hip arthroplasty  We will use Ecotrin aspirin postoperatively for DVT prophylaxis    The proposed procedure as well as associated risks/benefits were discussed at length with the patient and family with risks to include pain, bleeding, infection, future surgeries, neurovascular compromise, loss of limb, heart attack, stroke, deep vein thrombosis, and even death. They understand and agree with the treatment plan.        No follow-ups on file.    DISCLAIMER: This note may have been dictated using voice recognition software and may contain grammatical errors.

## 2023-12-20 NOTE — H&P (VIEW-ONLY)
Chief Complaint:   No chief complaint on file.      History of present illness:    73 year old female presents today for preop evaluation for robotic assisted left total hip arthroplasty on January 4th.  No new complaints or concerns today.  Her pain is located in the groin and radiates to the lateral hip.  Her pain is constant and worse with weightbearing and activity.  She does have a known history of advanced osteoarthritis.  No relief with physical therapy, cortisone injections, activity modification.  She underwent total knee arthroplasty in August and has done very well with that.    Past Medical History:   Diagnosis Date    Acid reflux     Arthritis     chronic lymphocytic leukemia     Hypertension     Renal cyst     Sleep apnea        Past Surgical History:   Procedure Laterality Date    BREAST SURGERY Left 1991?    lumpectomy, NO Restrictions    CARPAL TUNNEL RELEASE      CYST REMOVAL      EPIDURAL STEROID INJECTION      JOINT REPLACEMENT  07/21    Left knee    KNEE JOINT MANIPULATION Left 08/29/2022    Procedure: MANIPULATION, KNEE;  Surgeon: Sohan Bowens MD;  Location: [facility] OR;  Service: Orthopedics;  Laterality: Left;    REVISION OF KNEE ARTHROPLASTY Left 06/08/2022    Procedure: REVISION, ARTHROPLASTY, KNEE;  Surgeon: Sohan Bowens MD;  Location: [facility] OR;  Service: Orthopedics;  Laterality: Left;  FEMUR / PATHOLOGY / ASHLEY    ROBOTIC ARTHROPLASTY, KNEE Right 08/17/2023    Procedure: ROBOTIC ARTHROPLASTY, KNEE, TOTAL;  Surgeon: Isidro Barone MD;  Location: [facility] OR;  Service: Orthopedics;  Laterality: Right;    SHOULDER SURGERY Right     SINUS SURGERY      x 2    SPINE SURGERY  05/21    Bulging disc in lower back    THYROID SURGERY      TONSILLECTOMY         Current Outpatient Medications   Medication Sig    amLODIPine (NORVASC) 5 MG tablet Take 1 tablet (5 mg total) by mouth every evening.    B-complex with vitamin C (Z-BEC OR EQUIV) tablet Take 1 tablet by mouth once daily.     cholecalciferol, vitamin D3, 125 mcg (5,000 unit) capsule Take 5,000 Units by mouth once daily.    lisdexamfetamine (VYVANSE) 30 MG capsule Take 30 mg by mouth every morning.    metoprolol tartrate (LOPRESSOR) 50 MG tablet Take 75 mg by mouth once daily at 6am.    pantoprazole (PROTONIX) 40 MG tablet 40 mg once daily.    polysaccharide iron complex 200 mg iron Cap Take 1 capsule by mouth Daily.    pramipexole (MIRAPEX) 1.5 MG tablet Take 1.5 mg by mouth 2 (two) times a day.    telmisartan-hydrochlorothiazide (MICARDIS HCT) 40-12.5 mg per tablet Take 1 tablet by mouth once daily.    vitamin B complex (B COMPLEX 1 ORAL) Take 1 tablet by mouth once daily.    aspirin (ECOTRIN) 81 MG EC tablet Take 1 tablet (81 mg total) by mouth every 12 (twelve) hours. Blood clot prevention    HYDROcodone-acetaminophen (NORCO) 5-325 mg per tablet Take 1 tablet by mouth every 6 (six) hours as needed for Pain.    ketorolac (TORADOL) 10 mg tablet Take 10 mg by mouth every 6 (six) hours.    mupirocin (BACTROBAN) 2 % ointment Apply topically 3 (three) times daily. (Patient not taking: Reported on 12/20/2023)     No current facility-administered medications for this visit.       Review of patient's allergies indicates:   Allergen Reactions    Duloxetine      Other reaction(s): Bradycardia, Hypotension, Vomit    Lorazepam      Other reaction(s): Confusion  amnesia      Tramadol Other (See Comments)     nightmares       Family History   Problem Relation Age of Onset    Hypertension Mother     COPD Mother     Hearing loss Mother     Hypertension Father     COPD Father     Hearing loss Father     Breast cancer Sister     Cancer Sister     Diabetes Maternal Grandmother         Reina    Diabetes Paternal Grandmother        Social History     Socioeconomic History    Marital status:    Tobacco Use    Smoking status: Former     Current packs/day: 0.00     Average packs/day: 1 pack/day for 10.0 years (10.0 ttl pk-yrs)     Types: Cigarettes  "    Start date: 3/18/1976     Quit date: 3/18/1986     Years since quittin.7    Smokeless tobacco: Former   Substance and Sexual Activity    Alcohol use: Not Currently    Drug use: Never    Sexual activity: Yes     Partners: Male     Birth control/protection: Post-menopausal         Review of Systems:    Constitution:   Denies chills, fever, and sweats.  HENT:   Denies headaches or blurry vision.  Cardiovascular:  Denies chest pain or irregular heart beat.  Respiratory:   Denies cough or shortness of breath.  Gastrointestinal:  Denies abdominal pain, nausea, or vomiting.  Musculoskeletal:   Denies muscle cramps.  Neurological:   Denies dizziness or focal weakness.  Psychiatric/Behavior: Normal mental status.  Hematology/Lymph:  Denies bleeding problem or easy bruising/bleeding.  Skin:    Denies rash or suspicious lesions.    Examination:    Vital Signs:    Vitals:    23 1308   BP: (!) 144/77   Pulse: 105   Weight: 74.8 kg (165 lb)   Height: 5' 1" (1.549 m)       Body mass index is 31.18 kg/m².    Constitution:   Well-developed, well nourished patient in no acute distress.  Neurological:   Alert and oriented x 3 and cooperative to examination.     Psychiatric/Behavior: Normal mental status.  Respiratory:   No shortness of breath.  Nonlabored breathing  Cardiovascular:           Regular rate and rhythm  Eyes:    Extraoccular muscles intact  Skin:    No scars, rash or suspicious lesions.    Physical Exam:       left hip exam     No signs of edema, erythema, or induration.    Tender over the greater trochanteric region.    Pain with internal and external rotation    Passive hip abduction 20 degrees   Passive hip flexion 90 degrees   Passive internal rotation 5 degrees   Passive external rotation 20 degrees   No weakness of the left hip was observed. An antalgic gait was observed. limping was noted     xrays    left hip x-ray with two or more views of the AP and lateral aspects were reviewed.   Impressions "   Advanced Joint space narrowing, bone on bone with osteophytes arising from the hip joint and subchondral cysts seen        Assessment:     Primary osteoarthritis of left hip    Hypertension, unspecified type    Impaired gait and mobility    Pre-operative laboratory examination    Chronic lymphocytic leukemia        Plan:      Robotic assisted left total hip arthroplasty  We will use Ecotrin aspirin postoperatively for DVT prophylaxis    The proposed procedure as well as associated risks/benefits were discussed at length with the patient and family with risks to include pain, bleeding, infection, future surgeries, neurovascular compromise, loss of limb, heart attack, stroke, deep vein thrombosis, and even death. They understand and agree with the treatment plan.        No follow-ups on file.    DISCLAIMER: This note may have been dictated using voice recognition software and may contain grammatical errors.

## 2023-12-22 ENCOUNTER — TELEPHONE (OUTPATIENT)
Dept: ORTHOPEDICS | Facility: CLINIC | Age: 73
End: 2023-12-22
Payer: MEDICARE

## 2023-12-22 RX ORDER — MUPIROCIN 20 MG/G
OINTMENT TOPICAL 2 TIMES DAILY
Qty: 22 G | Refills: 0 | Status: SHIPPED | OUTPATIENT
Start: 2023-12-22 | End: 2023-12-29

## 2023-12-22 NOTE — TELEPHONE ENCOUNTER
----- Message from ABEBE Red sent at 12/22/2023  7:46 AM CST -----  Regarding: MRSA swab  Patient is scheduled for total hip arthroplasty on January 4th.  Her preoperative MRSA swab was positive.  I have sent Bactroban ointment to her pharmacy.  Can you please notify the patient of these results and that she would need to apply the ointment to both nostrils 5 days prior to surgery.  I have also added IV vancomycin to her preoperative order set   Thank you  ----- Message -----  From: Lab, Background User  Sent: 12/20/2023   9:43 PM CST  To: ABEBE Red

## 2023-12-22 NOTE — TELEPHONE ENCOUNTER
I called the patient to relay Gustabo's message regarding her MRSA results.     She verbalized a clear understanding.

## 2023-12-28 ENCOUNTER — ANESTHESIA EVENT (OUTPATIENT)
Dept: SURGERY | Facility: HOSPITAL | Age: 73
End: 2023-12-28
Payer: MEDICARE

## 2023-12-28 ENCOUNTER — TELEPHONE (OUTPATIENT)
Dept: ORTHOPEDICS | Facility: CLINIC | Age: 73
End: 2023-12-28
Payer: MEDICARE

## 2023-12-28 DIAGNOSIS — Z96.642 S/P TOTAL LEFT HIP ARTHROPLASTY: Primary | ICD-10-CM

## 2023-12-28 NOTE — TELEPHONE ENCOUNTER
Patient called asking for her post operative physical therapy order for her LT MARRY SAUD on 01/04/24 to be sent to the [facility].     I called the patient to let her know that I received her voice mail and that I will fax this to that facility.     She verbalized a clear understanding.

## 2024-01-04 ENCOUNTER — HOSPITAL ENCOUNTER (OUTPATIENT)
Facility: HOSPITAL | Age: 74
Discharge: HOME | End: 2024-01-05
Attending: SPECIALIST | Admitting: SPECIALIST
Payer: MEDICARE

## 2024-01-04 ENCOUNTER — ANESTHESIA (OUTPATIENT)
Dept: SURGERY | Facility: HOSPITAL | Age: 74
End: 2024-01-04
Payer: MEDICARE

## 2024-01-04 DIAGNOSIS — R26.89 IMPAIRED GAIT AND MOBILITY: ICD-10-CM

## 2024-01-04 DIAGNOSIS — I10 HYPERTENSION, UNSPECIFIED TYPE: ICD-10-CM

## 2024-01-04 DIAGNOSIS — M16.12 PRIMARY OSTEOARTHRITIS OF LEFT HIP: ICD-10-CM

## 2024-01-04 DIAGNOSIS — R79.9 ABNORMAL BLOOD CHEMISTRY: ICD-10-CM

## 2024-01-04 DIAGNOSIS — Z01.812 PRE-OPERATIVE LABORATORY EXAMINATION: ICD-10-CM

## 2024-01-04 LAB
HCT VFR BLD AUTO: 29.7 % (ref 37–47)
HGB BLD-MCNC: 9.8 G/DL (ref 12–16)
POCT GLUCOSE: 101 MG/DL (ref 70–110)

## 2024-01-04 PROCEDURE — 36000712 HC OR TIME LEV V 1ST 15 MIN: Performed by: SPECIALIST

## 2024-01-04 PROCEDURE — 25000003 PHARM REV CODE 250: Performed by: STUDENT IN AN ORGANIZED HEALTH CARE EDUCATION/TRAINING PROGRAM

## 2024-01-04 PROCEDURE — 37000009 HC ANESTHESIA EA ADD 15 MINS: Performed by: SPECIALIST

## 2024-01-04 PROCEDURE — 63600175 PHARM REV CODE 636 W HCPCS: Performed by: PHYSICIAN ASSISTANT

## 2024-01-04 PROCEDURE — 25000003 PHARM REV CODE 250: Performed by: PHYSICIAN ASSISTANT

## 2024-01-04 PROCEDURE — 97162 PT EVAL MOD COMPLEX 30 MIN: CPT

## 2024-01-04 PROCEDURE — 88307 TISSUE EXAM BY PATHOLOGIST: CPT | Performed by: SPECIALIST

## 2024-01-04 PROCEDURE — D9220A PRA ANESTHESIA: Mod: CRNA,,, | Performed by: STUDENT IN AN ORGANIZED HEALTH CARE EDUCATION/TRAINING PROGRAM

## 2024-01-04 PROCEDURE — 88341 IMHCHEM/IMCYTCHM EA ADD ANTB: CPT

## 2024-01-04 PROCEDURE — G0378 HOSPITAL OBSERVATION PER HR: HCPCS

## 2024-01-04 PROCEDURE — 99900035 HC TECH TIME PER 15 MIN (STAT)

## 2024-01-04 PROCEDURE — 88311 DECALCIFY TISSUE: CPT

## 2024-01-04 PROCEDURE — 63600175 PHARM REV CODE 636 W HCPCS: Performed by: SPECIALIST

## 2024-01-04 PROCEDURE — A4216 STERILE WATER/SALINE, 10 ML: HCPCS | Performed by: SPECIALIST

## 2024-01-04 PROCEDURE — 27130 TOTAL HIP ARTHROPLASTY: CPT | Mod: AS,LT,, | Performed by: PHYSICIAN ASSISTANT

## 2024-01-04 PROCEDURE — 51798 US URINE CAPACITY MEASURE: CPT

## 2024-01-04 PROCEDURE — 82962 GLUCOSE BLOOD TEST: CPT | Performed by: SPECIALIST

## 2024-01-04 PROCEDURE — 94761 N-INVAS EAR/PLS OXIMETRY MLT: CPT

## 2024-01-04 PROCEDURE — 25000003 PHARM REV CODE 250: Performed by: SPECIALIST

## 2024-01-04 PROCEDURE — 0055T BONE SRGRY CMPTR CT/MRI IMAG: CPT | Mod: ,,, | Performed by: SPECIALIST

## 2024-01-04 PROCEDURE — 36000713 HC OR TIME LEV V EA ADD 15 MIN: Performed by: SPECIALIST

## 2024-01-04 PROCEDURE — 88342 IMHCHEM/IMCYTCHM 1ST ANTB: CPT

## 2024-01-04 PROCEDURE — 27000221 HC OXYGEN, UP TO 24 HOURS

## 2024-01-04 PROCEDURE — 37000008 HC ANESTHESIA 1ST 15 MINUTES: Performed by: SPECIALIST

## 2024-01-04 PROCEDURE — 27130 TOTAL HIP ARTHROPLASTY: CPT | Mod: LT,,, | Performed by: SPECIALIST

## 2024-01-04 PROCEDURE — D9220A PRA ANESTHESIA: Mod: ANES,,, | Performed by: ANESTHESIOLOGY

## 2024-01-04 PROCEDURE — C1776 JOINT DEVICE (IMPLANTABLE): HCPCS | Performed by: SPECIALIST

## 2024-01-04 PROCEDURE — 94799 UNLISTED PULMONARY SVC/PX: CPT

## 2024-01-04 PROCEDURE — 63600175 PHARM REV CODE 636 W HCPCS: Performed by: STUDENT IN AN ORGANIZED HEALTH CARE EDUCATION/TRAINING PROGRAM

## 2024-01-04 PROCEDURE — 27201423 OPTIME MED/SURG SUP & DEVICES STERILE SUPPLY: Performed by: SPECIALIST

## 2024-01-04 PROCEDURE — 71000033 HC RECOVERY, INTIAL HOUR: Performed by: SPECIALIST

## 2024-01-04 PROCEDURE — 85018 HEMOGLOBIN: CPT | Performed by: SPECIALIST

## 2024-01-04 PROCEDURE — C1713 ANCHOR/SCREW BN/BN,TIS/BN: HCPCS | Performed by: SPECIALIST

## 2024-01-04 DEVICE — TRIDENT II TRITANIUM CLUSTER 46C
Type: IMPLANTABLE DEVICE | Site: HIP | Status: FUNCTIONAL
Brand: TRIDENT II

## 2024-01-04 DEVICE — 6.5MM LOW PROFILE HEX SCREW 35MM
Type: IMPLANTABLE DEVICE | Site: HIP | Status: FUNCTIONAL
Brand: TRIDENT II

## 2024-01-04 DEVICE — LFIT V40 FEMORAL HEAD
Type: IMPLANTABLE DEVICE | Site: HIP | Status: FUNCTIONAL
Brand: V40 HEAD

## 2024-01-04 DEVICE — 127 DEGREE NECK ANGLE HIP STEM
Type: IMPLANTABLE DEVICE | Site: HIP | Status: FUNCTIONAL
Brand: ACCOLADE

## 2024-01-04 RX ORDER — TELMISARTAN AND HYDROCHLORTHIAZIDE 40; 12.5 MG/1; MG/1
1 TABLET ORAL DAILY
Status: DISCONTINUED | OUTPATIENT
Start: 2024-01-04 | End: 2024-01-05 | Stop reason: HOSPADM

## 2024-01-04 RX ORDER — KETOROLAC TROMETHAMINE 10 MG/1
10 TABLET, FILM COATED ORAL
Status: COMPLETED | OUTPATIENT
Start: 2024-01-04 | End: 2024-01-04

## 2024-01-04 RX ORDER — LACTULOSE 10 G/15ML
20 SOLUTION ORAL EVERY 6 HOURS PRN
Status: DISCONTINUED | OUTPATIENT
Start: 2024-01-04 | End: 2024-01-05 | Stop reason: HOSPADM

## 2024-01-04 RX ORDER — MAG HYDROX/ALUMINUM HYD/SIMETH 200-200-20
30 SUSPENSION, ORAL (FINAL DOSE FORM) ORAL EVERY 6 HOURS PRN
Status: DISCONTINUED | OUTPATIENT
Start: 2024-01-04 | End: 2024-01-05 | Stop reason: HOSPADM

## 2024-01-04 RX ORDER — ACETAMINOPHEN 500 MG
500 TABLET ORAL EVERY 4 HOURS
Status: DISCONTINUED | OUTPATIENT
Start: 2024-01-04 | End: 2024-01-05

## 2024-01-04 RX ORDER — FACIAL-BODY WIPES
10 EACH TOPICAL DAILY
Status: DISCONTINUED | OUTPATIENT
Start: 2024-01-07 | End: 2024-01-05 | Stop reason: HOSPADM

## 2024-01-04 RX ORDER — KETOROLAC TROMETHAMINE 30 MG/ML
INJECTION, SOLUTION INTRAMUSCULAR; INTRAVENOUS
Status: DISPENSED
Start: 2024-01-04 | End: 2024-01-04

## 2024-01-04 RX ORDER — EPINEPHRINE 1 MG/ML
INJECTION, SOLUTION, CONCENTRATE INTRAVENOUS
Status: DISPENSED
Start: 2024-01-04 | End: 2024-01-04

## 2024-01-04 RX ORDER — TRANEXAMIC ACID 650 MG/1
1950 TABLET ORAL
Status: COMPLETED | OUTPATIENT
Start: 2024-01-04 | End: 2024-01-04

## 2024-01-04 RX ORDER — EPINEPHRINE 1 MG/ML
INJECTION, SOLUTION, CONCENTRATE INTRAVENOUS
Status: DISCONTINUED | OUTPATIENT
Start: 2024-01-04 | End: 2024-01-04 | Stop reason: HOSPADM

## 2024-01-04 RX ORDER — ONDANSETRON 4 MG/1
4 TABLET, ORALLY DISINTEGRATING ORAL
Status: COMPLETED | OUTPATIENT
Start: 2024-01-04 | End: 2024-01-04

## 2024-01-04 RX ORDER — SCOLOPAMINE TRANSDERMAL SYSTEM 1 MG/1
1 PATCH, EXTENDED RELEASE TRANSDERMAL ONCE AS NEEDED
Status: DISCONTINUED | OUTPATIENT
Start: 2024-01-04 | End: 2024-01-04 | Stop reason: HOSPADM

## 2024-01-04 RX ORDER — METOCLOPRAMIDE HYDROCHLORIDE 5 MG/ML
10 INJECTION INTRAMUSCULAR; INTRAVENOUS
Status: DISCONTINUED | OUTPATIENT
Start: 2024-01-04 | End: 2024-01-04

## 2024-01-04 RX ORDER — POLYETHYLENE GLYCOL 3350 17 G/17G
17 POWDER, FOR SOLUTION ORAL NIGHTLY
Status: DISCONTINUED | OUTPATIENT
Start: 2024-01-04 | End: 2024-01-05 | Stop reason: HOSPADM

## 2024-01-04 RX ORDER — LIDOCAINE HYDROCHLORIDE 10 MG/ML
INJECTION, SOLUTION EPIDURAL; INFILTRATION; INTRACAUDAL; PERINEURAL
Status: DISCONTINUED | OUTPATIENT
Start: 2024-01-04 | End: 2024-01-04

## 2024-01-04 RX ORDER — METHOCARBAMOL 750 MG/1
750 TABLET, FILM COATED ORAL EVERY 8 HOURS PRN
Status: DISCONTINUED | OUTPATIENT
Start: 2024-01-04 | End: 2024-01-05 | Stop reason: HOSPADM

## 2024-01-04 RX ORDER — PHENYLEPHRINE HCL IN 0.9% NACL 1 MG/10 ML
SYRINGE (ML) INTRAVENOUS
Status: DISCONTINUED | OUTPATIENT
Start: 2024-01-04 | End: 2024-01-04

## 2024-01-04 RX ORDER — MORPHINE SULFATE 10 MG/ML
INJECTION INTRAMUSCULAR; INTRAVENOUS; SUBCUTANEOUS
Status: DISCONTINUED | OUTPATIENT
Start: 2024-01-04 | End: 2024-01-04 | Stop reason: HOSPADM

## 2024-01-04 RX ORDER — SODIUM CHLORIDE 9 MG/ML
INJECTION, SOLUTION INTRAMUSCULAR; INTRAVENOUS; SUBCUTANEOUS
Status: DISCONTINUED | OUTPATIENT
Start: 2024-01-04 | End: 2024-01-04 | Stop reason: HOSPADM

## 2024-01-04 RX ORDER — AMOXICILLIN 250 MG
2 CAPSULE ORAL 2 TIMES DAILY
Status: DISCONTINUED | OUTPATIENT
Start: 2024-01-04 | End: 2024-01-05 | Stop reason: HOSPADM

## 2024-01-04 RX ORDER — FAMOTIDINE 20 MG/1
20 TABLET, FILM COATED ORAL 2 TIMES DAILY
Status: DISCONTINUED | OUTPATIENT
Start: 2024-01-04 | End: 2024-01-05 | Stop reason: HOSPADM

## 2024-01-04 RX ORDER — GLYCOPYRROLATE 0.2 MG/ML
INJECTION INTRAMUSCULAR; INTRAVENOUS
Status: DISCONTINUED | OUTPATIENT
Start: 2024-01-04 | End: 2024-01-04

## 2024-01-04 RX ORDER — MORPHINE SULFATE 4 MG/ML
4 INJECTION, SOLUTION INTRAMUSCULAR; INTRAVENOUS
Status: ACTIVE | OUTPATIENT
Start: 2024-01-04 | End: 2024-01-05

## 2024-01-04 RX ORDER — BUPIVACAINE HYDROCHLORIDE 7.5 MG/ML
INJECTION, SOLUTION EPIDURAL; RETROBULBAR
Status: COMPLETED | OUTPATIENT
Start: 2024-01-04 | End: 2024-01-04

## 2024-01-04 RX ORDER — MIDAZOLAM HYDROCHLORIDE 1 MG/ML
INJECTION INTRAMUSCULAR; INTRAVENOUS
Status: DISCONTINUED | OUTPATIENT
Start: 2024-01-04 | End: 2024-01-04

## 2024-01-04 RX ORDER — AMLODIPINE BESYLATE 5 MG/1
5 TABLET ORAL NIGHTLY
Status: DISCONTINUED | OUTPATIENT
Start: 2024-01-04 | End: 2024-01-05 | Stop reason: HOSPADM

## 2024-01-04 RX ORDER — TRAMADOL HYDROCHLORIDE 50 MG/1
50 TABLET ORAL EVERY 4 HOURS PRN
Status: DISCONTINUED | OUTPATIENT
Start: 2024-01-04 | End: 2024-01-04

## 2024-01-04 RX ORDER — GABAPENTIN 300 MG/1
300 CAPSULE ORAL NIGHTLY
Status: DISCONTINUED | OUTPATIENT
Start: 2024-01-04 | End: 2024-01-05 | Stop reason: HOSPADM

## 2024-01-04 RX ORDER — HYDROCODONE BITARTRATE AND ACETAMINOPHEN 5; 325 MG/1; MG/1
1 TABLET ORAL EVERY 4 HOURS PRN
Status: DISCONTINUED | OUTPATIENT
Start: 2024-01-04 | End: 2024-01-05

## 2024-01-04 RX ORDER — SODIUM CHLORIDE 9 MG/ML
INJECTION, SOLUTION INTRAVENOUS CONTINUOUS
Status: DISCONTINUED | OUTPATIENT
Start: 2024-01-04 | End: 2024-01-05 | Stop reason: HOSPADM

## 2024-01-04 RX ORDER — PROPOFOL 10 MG/ML
VIAL (ML) INTRAVENOUS
Status: DISCONTINUED | OUTPATIENT
Start: 2024-01-04 | End: 2024-01-04

## 2024-01-04 RX ORDER — ACETAMINOPHEN 500 MG
1000 TABLET ORAL
Status: COMPLETED | OUTPATIENT
Start: 2024-01-04 | End: 2024-01-04

## 2024-01-04 RX ORDER — GUAIFENESIN 100 MG/5ML
81 LIQUID (ML) ORAL 2 TIMES DAILY
Status: DISCONTINUED | OUTPATIENT
Start: 2024-01-05 | End: 2024-01-05 | Stop reason: HOSPADM

## 2024-01-04 RX ORDER — DOCUSATE SODIUM 100 MG/1
200 CAPSULE, LIQUID FILLED ORAL DAILY
Status: DISCONTINUED | OUTPATIENT
Start: 2024-01-05 | End: 2024-01-05 | Stop reason: HOSPADM

## 2024-01-04 RX ORDER — ACETAMINOPHEN 10 MG/ML
1000 INJECTION, SOLUTION INTRAVENOUS ONCE
Status: COMPLETED | OUTPATIENT
Start: 2024-01-04 | End: 2024-01-04

## 2024-01-04 RX ORDER — TALC
6 POWDER (GRAM) TOPICAL NIGHTLY PRN
Status: DISCONTINUED | OUTPATIENT
Start: 2024-01-04 | End: 2024-01-05 | Stop reason: HOSPADM

## 2024-01-04 RX ORDER — EPHEDRINE SULFATE 50 MG/ML
INJECTION, SOLUTION INTRAVENOUS
Status: DISCONTINUED | OUTPATIENT
Start: 2024-01-04 | End: 2024-01-04

## 2024-01-04 RX ORDER — SODIUM CHLORIDE, SODIUM GLUCONATE, SODIUM ACETATE, POTASSIUM CHLORIDE AND MAGNESIUM CHLORIDE 30; 37; 368; 526; 502 MG/100ML; MG/100ML; MG/100ML; MG/100ML; MG/100ML
INJECTION, SOLUTION INTRAVENOUS CONTINUOUS
Status: DISCONTINUED | OUTPATIENT
Start: 2024-01-04 | End: 2024-01-04

## 2024-01-04 RX ORDER — GABAPENTIN 300 MG/1
300 CAPSULE ORAL
Status: COMPLETED | OUTPATIENT
Start: 2024-01-04 | End: 2024-01-04

## 2024-01-04 RX ORDER — METOCLOPRAMIDE 10 MG/1
10 TABLET ORAL EVERY 6 HOURS
Status: DISCONTINUED | OUTPATIENT
Start: 2024-01-05 | End: 2024-01-05 | Stop reason: HOSPADM

## 2024-01-04 RX ORDER — LISDEXAMFETAMINE DIMESYLATE CAPSULES 30 MG/1
30 CAPSULE ORAL EVERY MORNING
Status: DISCONTINUED | OUTPATIENT
Start: 2024-01-04 | End: 2024-01-05 | Stop reason: HOSPADM

## 2024-01-04 RX ORDER — MORPHINE SULFATE 10 MG/ML
INJECTION INTRAMUSCULAR; INTRAVENOUS; SUBCUTANEOUS
Status: DISPENSED
Start: 2024-01-04 | End: 2024-01-04

## 2024-01-04 RX ORDER — ONDANSETRON 2 MG/ML
4 INJECTION INTRAMUSCULAR; INTRAVENOUS EVERY 6 HOURS PRN
Status: DISCONTINUED | OUTPATIENT
Start: 2024-01-04 | End: 2024-01-05 | Stop reason: HOSPADM

## 2024-01-04 RX ORDER — SODIUM CHLORIDE 0.9 % (FLUSH) 0.9 %
SYRINGE (ML) INJECTION
Status: DISPENSED
Start: 2024-01-04 | End: 2024-01-04

## 2024-01-04 RX ORDER — KETOROLAC TROMETHAMINE 30 MG/ML
INJECTION, SOLUTION INTRAMUSCULAR; INTRAVENOUS
Status: DISCONTINUED | OUTPATIENT
Start: 2024-01-04 | End: 2024-01-04 | Stop reason: HOSPADM

## 2024-01-04 RX ORDER — ROPIVACAINE HYDROCHLORIDE 5 MG/ML
INJECTION, SOLUTION EPIDURAL; INFILTRATION; PERINEURAL
Status: DISCONTINUED | OUTPATIENT
Start: 2024-01-04 | End: 2024-01-04 | Stop reason: HOSPADM

## 2024-01-04 RX ORDER — ROPIVACAINE HYDROCHLORIDE 5 MG/ML
INJECTION, SOLUTION EPIDURAL; INFILTRATION; PERINEURAL
Status: DISPENSED
Start: 2024-01-04 | End: 2024-01-04

## 2024-01-04 RX ADMIN — BUPIVACAINE HYDROCHLORIDE 1.8 ML: 7.5 INJECTION, SOLUTION EPIDURAL; RETROBULBAR at 10:01

## 2024-01-04 RX ADMIN — CEFAZOLIN 2 G: 2 INJECTION, POWDER, FOR SOLUTION INTRAMUSCULAR; INTRAVENOUS at 09:01

## 2024-01-04 RX ADMIN — EPHEDRINE SULFATE 5 MG: 50 INJECTION INTRAVENOUS at 10:01

## 2024-01-04 RX ADMIN — MIDAZOLAM 2 MG: 1 INJECTION INTRAMUSCULAR; INTRAVENOUS at 10:01

## 2024-01-04 RX ADMIN — METHOCARBAMOL 750 MG: 750 TABLET ORAL at 04:01

## 2024-01-04 RX ADMIN — GABAPENTIN 300 MG: 300 CAPSULE ORAL at 09:01

## 2024-01-04 RX ADMIN — POLYETHYLENE GLYCOL 3350 17 G: 17 POWDER, FOR SOLUTION ORAL at 09:01

## 2024-01-04 RX ADMIN — CEFAZOLIN 2 G: 2 INJECTION, POWDER, FOR SOLUTION INTRAMUSCULAR; INTRAVENOUS at 10:01

## 2024-01-04 RX ADMIN — ACETAMINOPHEN 1000 MG: 10 INJECTION INTRAVENOUS at 06:01

## 2024-01-04 RX ADMIN — VANCOMYCIN HYDROCHLORIDE 1000 MG: 1 INJECTION, POWDER, LYOPHILIZED, FOR SOLUTION INTRAVENOUS at 10:01

## 2024-01-04 RX ADMIN — EPHEDRINE SULFATE 5 MG: 50 INJECTION INTRAVENOUS at 11:01

## 2024-01-04 RX ADMIN — SODIUM CHLORIDE, SODIUM GLUCONATE, SODIUM ACETATE, POTASSIUM CHLORIDE AND MAGNESIUM CHLORIDE: 526; 502; 368; 37; 30 INJECTION, SOLUTION INTRAVENOUS at 08:01

## 2024-01-04 RX ADMIN — PRAMIPEXOLE DIHYDROCHLORIDE 1.5 MG: 0.25 TABLET ORAL at 09:01

## 2024-01-04 RX ADMIN — EPHEDRINE SULFATE 10 MG: 50 INJECTION INTRAVENOUS at 11:01

## 2024-01-04 RX ADMIN — GABAPENTIN 300 MG: 300 CAPSULE ORAL at 08:01

## 2024-01-04 RX ADMIN — PRAMIPEXOLE DIHYDROCHLORIDE 1.5 MG: 0.25 TABLET ORAL at 03:01

## 2024-01-04 RX ADMIN — ACETAMINOPHEN 500 MG: 500 TABLET ORAL at 09:01

## 2024-01-04 RX ADMIN — SENNOSIDES AND DOCUSATE SODIUM 2 TABLET: 8.6; 5 TABLET ORAL at 09:01

## 2024-01-04 RX ADMIN — METOPROLOL TARTRATE 75 MG: 50 TABLET, FILM COATED ORAL at 09:01

## 2024-01-04 RX ADMIN — PROPOFOL 50 MG: 10 INJECTION, EMULSION INTRAVENOUS at 10:01

## 2024-01-04 RX ADMIN — GLYCOPYRROLATE 0.2 MG: 0.2 INJECTION INTRAMUSCULAR; INTRAVENOUS at 10:01

## 2024-01-04 RX ADMIN — ACETAMINOPHEN 1000 MG: 500 TABLET ORAL at 08:01

## 2024-01-04 RX ADMIN — METOCLOPRAMIDE 10 MG: 5 INJECTION, SOLUTION INTRAMUSCULAR; INTRAVENOUS at 04:01

## 2024-01-04 RX ADMIN — Medication 100 MCG: at 10:01

## 2024-01-04 RX ADMIN — VANCOMYCIN HYDROCHLORIDE 1000 MG: 1 INJECTION, POWDER, LYOPHILIZED, FOR SOLUTION INTRAVENOUS at 08:01

## 2024-01-04 RX ADMIN — AMLODIPINE BESYLATE 5 MG: 5 TABLET ORAL at 09:01

## 2024-01-04 RX ADMIN — VANCOMYCIN HYDROCHLORIDE 1250 MG: 1.25 INJECTION, POWDER, LYOPHILIZED, FOR SOLUTION INTRAVENOUS at 10:01

## 2024-01-04 RX ADMIN — FAMOTIDINE 20 MG: 20 TABLET ORAL at 09:01

## 2024-01-04 RX ADMIN — SODIUM CHLORIDE, SODIUM GLUCONATE, SODIUM ACETATE, POTASSIUM CHLORIDE AND MAGNESIUM CHLORIDE: 526; 502; 368; 37; 30 INJECTION, SOLUTION INTRAVENOUS at 10:01

## 2024-01-04 RX ADMIN — ONDANSETRON 4 MG: 4 TABLET, ORALLY DISINTEGRATING ORAL at 08:01

## 2024-01-04 RX ADMIN — LIDOCAINE HYDROCHLORIDE 50 MG: 10 INJECTION, SOLUTION EPIDURAL; INFILTRATION; INTRACAUDAL; PERINEURAL at 10:01

## 2024-01-04 RX ADMIN — TRANEXAMIC ACID 1950 MG: 650 TABLET ORAL at 08:01

## 2024-01-04 RX ADMIN — KETOROLAC TROMETHAMINE 10 MG: 10 TABLET, FILM COATED ORAL at 08:01

## 2024-01-04 RX ADMIN — CEFAZOLIN 2 G: 2 INJECTION, POWDER, FOR SOLUTION INTRAMUSCULAR; INTRAVENOUS at 04:01

## 2024-01-04 NOTE — ANESTHESIA PROCEDURE NOTES
Spinal    Diagnosis: Osteoarthritis  Patient location during procedure: OR  Start time: 1/4/2024 10:27 AM  Timeout: 1/4/2024 10:26 AM  End time: 1/4/2024 10:28 AM    Staffing  Authorizing Provider: Willian Malone MD  Performing Provider: Turner Baldwin CRNA    Staffing  Performed by: Turner Baldwin CRNA  Authorized by: Willian Malone MD    Preanesthetic Checklist  Completed: patient identified, IV checked, site marked, risks and benefits discussed, surgical consent, monitors and equipment checked, pre-op evaluation and timeout performed  Spinal Block  Patient position: sitting  Prep: ChloraPrep  Patient monitoring: heart rate, continuous pulse ox and frequent blood pressure checks  Approach: midline  Location: L2-3  Injection technique: single shot  CSF Fluid: clear free-flowing CSF  Needle  Needle type: pencil-tip   Needle gauge: 25 G  Needle length: 3.5 in  Additional Documentation: incremental injection and negative aspiration for heme  Needle localization: anatomical landmarks  Assessment  Ease of block: easy  Patient's tolerance of the procedure: comfortable throughout block and no complaints  Additional Notes  Tolerated well, VSS  Medications:    Medications: bupivacaine (pf) (MARCAINE) injection 0.75% - Intraspinal   1.8 mL - 1/4/2024 10:27:00 AM

## 2024-01-04 NOTE — TRANSFER OF CARE
Anesthesia Transfer of Care Note    Patient: Amita Trejo    Procedure(s) Performed: Procedure(s) (LRB):  ROBOTIC ARTHROPLASTY,HIP (Left)    Patient location: PACU    Anesthesia Type: spinal and MAC    Transport from OR: Transported from OR on room air with adequate spontaneous ventilation    Post pain: adequate analgesia    Post assessment: no apparent anesthetic complications    Post vital signs: stable    Level of consciousness: awake and alert    Nausea/Vomiting: no nausea/vomiting    Complications: none    Transfer of care protocol was followed      Last vitals: Visit Vitals  BP (!) 125/56 (BP Location: Left arm, Patient Position: Lying)   Temp 36.6 °C (97.9 °F)   Resp 20   Ht 5' (1.524 m)   Wt 77.3 kg (170 lb 6.7 oz)   Breastfeeding No   BMI 33.28 kg/m²

## 2024-01-04 NOTE — OP NOTE
DATE OF PROCEDURE: 01/04/2024    PREOPERATIVE DIAGNOSIS: Arthritis,left hip.   POSTOPERATIVE DIAGNOSIS: Arthritis, left hip.   PROCEDURES PERFORMED: left total hip arthroplasty with robotic navigation.   SURGEON: Josh Barone M.D.   ASSISTANT: First assistant:Gustabo Rosenthal, certified physician assistant, who was necessary and essential for all aspects of the operation, including but no limited to patient positioning, surgical exposure, bony preparation, implantation, wound closure, and dressing placement.    ANESTHESIA: spinal    SPECIMEN: Femoral Head  FINDINGS: Arthritis  BLOOD LOSS: 240 ml  COMPLICATIONS: None.   COUNTS: Correct.   DISPOSITION: Recovery Room, stable.   IMPLANTS: Bala Trident II size 46 mm acetabular component with a 36 mm x MDM liner, Bala Accolade IIsize 4 ,    hip stem with a 36 mm standard neck length MDM femoral head.   INDICATIONS FOR PROCEDURE: Amita Trejo is a 73 y.o. year old female  who is having   symptoms of left hip pain. Physical examination and imaging studies were   consistent with left hip arthritis. She did not respond to nonoperative   treatment measures. Treatment options were explained to the patient. She   decided to proceed with left total hip arthroplasty with robotic assistance.   She was aware of reasonable treatment options as well as risks and benefits, and   elected to undergo the procedure.   PROCEDURE IN DETAIL: After appropriate consent was obtained, the patient was   brought in the Operating Room, anesthesia was administered.  Prior to this, the patient received antibiotic prophylaxis.   She was then positioned in the lateral decubitus position with the left hip   pointed upward. Axillary roll was placed. Bony prominences were well padded.   Pegboard positioning system was utilized. The left hip and lower extremity   were then prepped and draped in the usual sterile fashion. A time out was called.  There were no concerns expressed by members of the  team, and the correct side was confirmed. Three small incisions were made over the iliac crest, followed by pin placement. The pelvic array was assembled and registered.  Anterolateral   approach to the hip was made. Incision was made over the greater trochanter and was taken down through the skin and tensor fascia.   The tensor fascia was split in line with the skin incision. Skin bleeders were coagulated with cautery. The sciatic nerve   was palpated and protected, it was out of harm's way and the Charnley retractor was placed. Femoral checkpoint was placed and registered.The anterolateral approach was performed by incising the anterior one-fourth of the gluteus medius while leaving a tendinous cuff for later repair. I then incised the gluteus minimus superiorly in line with the neck of the femur. A posterior capsulotomy was performed along the neck of the femur. The hip was externally rotated until the fibers of the vastus intermedius were identified. Retractors were repositioned to expose the anterior capsule and an anterior capsulotomy was performed. The hip was then externally rotated, dislocated, and a femoral neck osteotomy was made at the appropriate level. The femoral head and acetabulum were denuded of articular cartilage down to subchondral bone. Acetabular retractors were placed and the labrum was excised. The pelvic checkpoint was then registered followed by fine point registration of the acetabulum. Reaming was then performed to the planned depth and position robotically.  Utilizing the robotic interactive arm, the cup was impacted in 45° of abduction and 20° of anteversion. There was an excellent pressfit of the acetabular component. The liner was then pressfit into the cup.  The hip was then repositioned to expose the proximal femur. The box osteotome was used to lateralize the starting point on the femur, followed by the awl to open up the canal.  Sequential broaching was performed up to a  size 4  keyana. Trialing was performed with a 127-degree neck angle stem in 10 -degrees of anteversion.  The hip was reduced and there was excellent stability and restoration of leg lengths and offset, with no dislocation, subluxation, or impingement. At this point, the hip was dislocated with the aid of a neck hook.   The femoral trial component was removed. The actual femoral component was   firmly seated. The neck and calcar was inspected.  There was no crack or fracture. I remeasured at this point and, once again, we needed the standard neck length 36 mm diameter MDM femoral  head. The head was then impacted onto a clean, dry trunion. The   acetabulum was inspected. There was no loose body, foreign body, or soft tissue   interposition. The hip was then reduced, brought through range of motion, and   stable as previously described. At this point, the hip was soaked in a dilute betadine solution for three minutes, then irrigated. The soft tissues and capsule were then injected for postoperative pain control. Three small drill holes were made in the proximal femur, and through these drill holes the gluteus minimus was repaired the its insertion. The anterior one-fourth of the gluteus medius was repaired to its tendinous cuff. A looped PDS suture was used to close the iliotibial band, followed by reapproximation of the skin with 2-0 vicryl suture. A running 4-0 monocryl suture was used for skin closure along with steri-strips. Sterile dressings were applied along with an abduction pillow, and the patient was taken to recovery room in stable condition.

## 2024-01-04 NOTE — ANESTHESIA PREPROCEDURE EVALUATION
01/04/2024  Amita Trejo is a 73 y.o., female.  Procedure Information    Case: [id number] Date/Time: 01/04/24 1105   Procedure: ROBOTIC ARTHROPLASTY,HIP (Left: Hip)   Anesthesia type: Spinal   Diagnosis: Primary osteoarthritis of left hip [M16.12]   Pre-op diagnosis: Primary osteoarthritis of left hip [M16.12]   Location: [facility] OR  / [facility] OR   Surgeons: Isidro Barone MD       Pre-op Assessment    I have reviewed the Patient Summary Reports.     I have reviewed the Nursing Notes. I have reviewed the NPO Status.   I have reviewed the Medications.     Review of Systems  Anesthesia Hx:  No problems with previous Anesthesia                Hematology/Oncology:  Hematology Normal   Oncology Normal                                   EENT/Dental:  EENT/Dental Normal           Cardiovascular:  Exercise tolerance: good   Hypertension                Functional Capacity good / => 4 METS                         Pulmonary:        Sleep Apnea                Renal/:   Denies Chronic Renal Disease.                Hepatic/GI:  Hepatic/GI Normal                 Musculoskeletal:  Arthritis               Neurological:  Neurology Normal                                      Endocrine:  Diabetes         Denies Morbid Obesity / BMI > 40  Dermatological:  Skin Normal    Psych:   anxiety                 Physical Exam  General: Alert, Oriented, Well nourished and Cooperative    Airway:  Mallampati: II   Mouth Opening: Normal  TM Distance: Normal  Tongue: Normal  Neck ROM: Normal ROM    Dental:  Intact    Chest/Lungs:  Clear to auscultation, Normal Respiratory Rate    Heart:  Rate: Normal  Rhythm: Regular Rhythm       Latest Reference Range & Units Most Recent   WBC 4.50 - 11.50 x10(3)/mcL 14.40 (H)  12/20/23 14:12   RBC 4.20 - 5.40 x10(6)/mcL 3.82 (L)  12/20/23 14:12   Hemoglobin 12.0 - 16.0 g/dL 11.5 (L)  12/20/23 14:12   Hematocrit  37.0 - 47.0 % 35.3 (L)  12/20/23 14:12   MCV 80.0 - 94.0 fL 92.4  12/20/23 14:12   MCH 27.0 - 31.0 pg 30.1  12/20/23 14:12   MCHC 33.0 - 36.0 g/dL 32.6 (L)  12/20/23 14:12   RDW 11.5 - 17.0 % 13.3  12/20/23 14:12   Platelet Count 130 - 400 x10(3)/mcL 212  12/20/23 14:12   MPV 7.4 - 10.4 fL 8.7  12/20/23 14:12   Platelet Estimate Normal, Adequate  Adequate  12/20/23 14:12   Neutrophils Relative 47 - 80 % 19 (L)  12/20/23 14:12   Lymphs % 13 - 40 % 73 (H)  12/20/23 14:12   Mono % 2 - 11 % 5  12/20/23 14:12   Eosinophil % 0 - 8 % 2  12/20/23 14:12   Basophil % 0 - 2 % 1  7/28/23 10:09   Immature Granulocytes 0 - 0.43 % 0.1  5/27/22 11:48   Gran # (ANC) 2.10 - 9.20  9.29  4/12/22 15:50   Neutrophils Abs Calc 2.1 - 9.2 x10(3)/mcL 2.88  12/20/23 14:12   Lymph # 0.6 - 4.6 x10(3)/mcL 10.512 (H)  12/20/23 14:12   Mono # 0.1 - 1.3 x10(3)/mcL 0.72  12/20/23 14:12   Eos # 0 - 0.9 x10(3)/mcL 0.288  12/20/23 14:12   Baso # 0 - 0.2 x10(3)/mcL 0.1642  7/28/23 10:09   Immature Grans (Abs) 0 - 0.0155 x10(3)/mcL 0.02 (H)  5/27/22 11:48   Bands 0 - 11 % 1  12/20/23 14:12   nRBC % 0.0  8/27/23 12:20   Macrocytosis (none)  Slight !  8/27/23 12:20   Microcytosis (none)  Slight !  8/27/23 12:20   Aniso (none)  Slight !  8/27/23 12:20   Poly (none)  Slight !  8/27/23 12:20   Hypo (none)  1+ !  12/20/23 14:12   RBC Morph Normal  Abnormal !  8/27/23 12:20   Iron 25 - 156 ug/dL 26 (E)  4/1/21 05:45   TIBC 204 - 477 ug/dL 306 (E)  4/1/21 05:45   Ferritin 5.0 - 204.0 NG/.8 (E)  4/1/21 05:45   Folate 7.3 - 19.9 ng/ml 18.8 (E)  4/1/21 05:45   Vitamin B-12 213 - 816 pg/mL 586 (E)  4/1/21 05:45   Iron Saturation 15 - 50 % 8 (L) (E)  4/1/21 05:45   Sed Rate 0 - 30  25  4/12/22 15:50   Protime 11.5 - 15.3 SECONDS 12.6 (E)  3/17/21 09:14   INR  0.9 (E)  3/17/21 09:14   Sodium 136 - 145 mmol/L 138  12/20/23 14:12   Potassium 3.5 - 5.1 mmol/L 3.7  12/20/23 14:12   Potassium 3.5 - 5.1 mmol/L 3.8 (E)  4/6/21 04:39   Chloride 98 - 107 mmol/L  104  12/20/23 14:12   CO2 23 - 31 mmol/L 26  12/20/23 14:12   Anion Gap mEq/L 9.0  8/18/23 04:18   BUN 9.8 - 20.1 mg/dL 22.4 (H)  12/20/23 14:12   Creatinine 0.55 - 1.02 mg/dL 0.86  12/20/23 14:12   BUN/CREAT RATIO  36  8/18/23 04:18   eGFR mls/min/1.73/m2 >60  12/20/23 14:12   eGFR if non African American mls/min/1.73/m2 >60  6/10/22 05:27   eGFR if African American >=60 mL/min/1.73mSq 93 (E)  4/6/21 04:39   GFR MDRD NON AF AMER >=60 mL/min/1.73mSq 77 (E)  4/6/21 04:39   Glucose 82 - 115 mg/dL 99  12/20/23 14:12   Calcium 8.4 - 10.2 mg/dL 9.9  12/20/23 14:12   ALP 40 - 150 unit/L 90  12/20/23 14:12   PROTEIN TOTAL 5.8 - 7.6 gm/dL 7.2  12/20/23 14:12   Albumin 3.4 - 4.8 g/dL 3.9  12/20/23 14:12   Albumin/Globulin Ratio 1.1 - 2.0 ratio 1.2  12/20/23 14:12   BILIRUBIN TOTAL <=1.5 mg/dL 0.5  12/20/23 14:12   AST 5 - 34 unit/L 17  12/20/23 14:12   ALT 0 - 55 unit/L 12  12/20/23 14:12   Globulin, Total 2.4 - 3.5 gm/dL 3.3  12/20/23 14:12   CPK 29 - 168 U/L 682 (H) (E)  4/1/21 00:15   CPK MB 0.1 - 6.5 ng/ml 7.0 (H) (E)  4/1/21 00:15   CRP, High Sensitivity 0.00 - 5.00  9.00  4/12/22 15:50   Troponin I 0.00 - 0.03 ng/mL 0.06 (H) (E)  4/1/21 00:15   Hemoglobin A1C External <=7.0 % 5.3  12/20/23 14:12   Estimated Avg Glucose mg/dL 105.4  12/20/23 14:12   TSH 0.350 - 4.940 uIU/ml 1.340 (E)  3/31/21 05:35   T4, Free 0.70 - 1.48 NG/DL 1.53 (H) (E)  3/31/21 05:35   Procalcitonin 0.02 - 0.10 ng/ml 0.08 (E)  3/31/21 05:35   ABO  O (E)  3/29/21 06:02   Rh Type  Positive (E)  3/29/21 06:02   PREPARE RBC (ML) SOFT  Rpt (E)  4/1/21 08:39   MRSA SCREEN BY PCR Not Detected  Detected !  12/20/23 14:12   Color, UA Yellow, Light-Yellow, Dark Yellow, Nikki, Straw  Yellow  12/20/23 14:12   Appearance, UA Clear  Clear  12/20/23 14:12   Specific Gravity,UA 1.005 - 1.030  1.025  12/20/23 14:12   pH, UA 5.0 - 8.5  6.0  12/20/23 14:12   Protein, UA Negative  Negative  12/20/23 14:12   Glucose, UA Negative, Normal  Negative  12/20/23 14:12    Ketones, UA Negative  Trace !  12/20/23 14:12   Occult Blood UA Negative  Negative  12/20/23 14:12   NITRITE UA Negative  Negative  12/20/23 14:12   Bilirubin, UA Negative  Negative  12/20/23 14:12   Urobilinogen, UA 0.2, 1.0, Normal  0.2  12/20/23 14:12   Leukocytes, UA Negative  Negative  12/20/23 14:12   RBC, UA None Seen, 0-2, 3-5, 0-5 /HPF None Seen  7/28/23 10:09   WBC, UA None Seen, 0-2, 3-5, 0-5 /HPF 3-5  7/28/23 10:09   Bacteria, UA None Seen, Rare, Occasional /HPF None Seen  7/28/23 10:09   Squam Epithel, UA None Seen, Rare, Occasional, Occ /HPF Few !  7/28/23 10:09   ANAEROBIC CULTURE [facility]  Rpt  6/8/22 10:24   TISSUE CULTURE - AEROBIC [facility]  Rpt  6/8/22 10:24   GRAM STAIN [facility]  Rpt  6/8/22 10:24   POCT Glucose 70 - 110 mg/dL 101  1/4/24 08:48   XR CHEST 1 VIEW  Rpt (E)  4/3/21 14:34   XR CHEST PA AND LATERAL  Rpt  12/20/23 14:09   XR HIP WITH PELVIS WHEN PERFORMED, 2 OR 3 VIEWS LEFT  Rpt  9/20/23 15:47   XR KNEE 1 OR 2 VIEW LEFT  Rpt  6/8/22 14:35   XR KNEE 3 VIEW LEFT  Rpt  6/23/22 13:06   XR KNEE 3 VIEW RIGHT  Rpt  9/1/23 08:41   XR KNEE COMP 4 OR MORE VIEWS BILAT  Rpt  6/26/23 10:38   XR KNEE COMP 4 OR MORE VIEWS RIGHT  Rpt  8/27/23 12:28   MRI THORACIC SPINE WITHOUT CONTRAST  Rpt  6/3/22 18:12   US RETROPERITONEAL COMPLETE  Rpt (E)  4/25/23 11:11   EKG 12-LEAD  Rpt  12/20/23 14:23   SPECIMEN TO PATHOLOGY  Rpt  8/17/23 08:43   Abnormal Lymphs % % 2  8/27/23 12:20   BCS Recommendation External  No follow-up frequency specified (E)  2/5/21 00:00   CARDIAC MONITORING STRIPS  Rpt  8/17/23 00:00   CT HIP WITHOUT CONTRAST LEFT W/SAUD PROTOCOL   Rpt  12/20/23 14:06   CT KNEE WITHOUT CONTRAST RIGHT W/SAUD PROTOCOL  Rpt  7/28/23 10:52   CTA CHEST AORTA NON CORONARY  Rpt (E)  3/31/21 02:03   CYTOLOGY FINAL REPORT  Rpt  6/8/22 00:00   ECHO REPORT  Rpt  6/8/22 00:00   Glucose Screen 70 - 100 mg/dL 101 (H) (E)  4/6/21 04:39   PTT 1:1 Initial 21 - 35 SECONDS 33 (E)  3/17/21 09:14   RESPIRATORY INFECTION PANEL (PCR),  NASOPHARYNGEAL  Rpt (E)  3/31/21 13:18   TRANSTHORACIC ECHO (TTE) COMPLETE  Rpt (E)  3/31/21 10:04   View [facility] Pathology Report  See Scanned [facility] Report  12/20/23 14:12   (H): Data is abnormally high  (L): Data is abnormally low  !: Data is abnormal  (E): External lab result  Rpt: View report in Results Review for more informationest Reason : M17.11,I10,R79.9,R26.89,Z01.812,     Vent. Rate : 069 BPM     Atrial Rate : 069 BPM      P-R Int : 140 ms          QRS Dur : 084 ms       QT Int : 392 ms       P-R-T Axes : 072 064 041 degrees      QTc Int : 420 ms     Normal sinus rhythm   Normal ECG   When compared with ECG of 27-MAY-2022 11:56,   No significant change was found   Confirmed by Bryce Menendez MD ([id number]) on 7/28/2023 8:21:54 PM     Referred By: SAMANTHA MEDEROS           Confirmed     Anesthesia Plan  Type of Anesthesia, risks & benefits discussed:    Anesthesia Type: Spinal, MAC  Intra-op Monitoring Plan: Standard ASA Monitors  Post Op Pain Control Plan: multimodal analgesia  Induction:  IV  Airway Plan: Direct  Informed Consent: Informed consent signed with the Patient and all parties understand the risks and agree with anesthesia plan.  All questions answered. Patient consented to blood products? Yes  ASA Score: 3  Day of Surgery Review of History & Physical: H&P Update referred to the surgeon/provider.I have interviewed and examined the patient. I have reviewed the patient's H&P dated:     Ready For Surgery From Anesthesia Perspective.   Cardiac clearance :   .

## 2024-01-04 NOTE — ANESTHESIA POSTPROCEDURE EVALUATION
Anesthesia Post Evaluation    Patient: Amita Trejo    Procedure(s) Performed: Procedure(s) (LRB):  ROBOTIC ARTHROPLASTY,HIP (Left)    Final Anesthesia Type: spinal      Patient location during evaluation: floor  Patient participation: Yes- Able to Participate  Level of consciousness: awake and alert and oriented  Post-procedure vital signs: reviewed and stable  Pain management: adequate  Airway patency: patent    PONV status at discharge: No PONV, nausea (controlled) and vomiting (controlled)  Anesthetic complications: no      Cardiovascular status: blood pressure returned to baseline and stable  Respiratory status: unassisted    Comments: SENSORI-MOTOR INTACT              Vitals Value Taken Time   /67 01/04/24 1257   Temp 35.7 °C (96.3 °F) 01/04/24 1257   Pulse 65 01/04/24 1257   Resp 16 01/04/24 1246   SpO2 100 % 01/04/24 1257   Vitals shown include unvalidated device data.      Event Time   Out of Recovery 12:51:00         Pain/Luan Score: Pain Rating Prior to Med Admin: 3 (1/4/2024  8:49 AM)  Luan Score: 9 (1/4/2024 12:45 PM)

## 2024-01-05 VITALS
TEMPERATURE: 99 F | HEART RATE: 74 BPM | RESPIRATION RATE: 18 BRPM | HEIGHT: 60 IN | OXYGEN SATURATION: 97 % | WEIGHT: 170.44 LBS | DIASTOLIC BLOOD PRESSURE: 64 MMHG | SYSTOLIC BLOOD PRESSURE: 96 MMHG | BODY MASS INDEX: 33.46 KG/M2

## 2024-01-05 LAB
ANION GAP SERPL CALC-SCNC: 8 MEQ/L
BUN SERPL-MCNC: 25.1 MG/DL (ref 9.8–20.1)
CALCIUM SERPL-MCNC: 8.7 MG/DL (ref 8.4–10.2)
CHLORIDE SERPL-SCNC: 101 MMOL/L (ref 98–107)
CO2 SERPL-SCNC: 25 MMOL/L (ref 23–31)
CREAT SERPL-MCNC: 0.91 MG/DL (ref 0.55–1.02)
CREAT/UREA NIT SERPL: 28
ERYTHROCYTE [DISTWIDTH] IN BLOOD BY AUTOMATED COUNT: 13.5 % (ref 11.5–17)
GFR SERPLBLD CREATININE-BSD FMLA CKD-EPI: >60 MLS/MIN/1.73/M2
GLUCOSE SERPL-MCNC: 95 MG/DL (ref 82–115)
HCT VFR BLD AUTO: 29.7 % (ref 37–47)
HGB BLD-MCNC: 9.6 G/DL (ref 12–16)
MCH RBC QN AUTO: 30.8 PG (ref 27–31)
MCHC RBC AUTO-ENTMCNC: 32.3 G/DL (ref 33–36)
MCV RBC AUTO: 95.2 FL (ref 80–94)
NRBC BLD AUTO-RTO: 0 %
PLATELET # BLD AUTO: 177 X10(3)/MCL (ref 130–400)
PMV BLD AUTO: 9.6 FL (ref 7.4–10.4)
POTASSIUM SERPL-SCNC: 4.6 MMOL/L (ref 3.5–5.1)
RBC # BLD AUTO: 3.12 X10(6)/MCL (ref 4.2–5.4)
SODIUM SERPL-SCNC: 134 MMOL/L (ref 136–145)
WBC # SPEC AUTO: 13.9 X10(3)/MCL (ref 4.5–11.5)

## 2024-01-05 PROCEDURE — 97530 THERAPEUTIC ACTIVITIES: CPT | Mod: CQ

## 2024-01-05 PROCEDURE — G0378 HOSPITAL OBSERVATION PER HR: HCPCS

## 2024-01-05 PROCEDURE — 97166 OT EVAL MOD COMPLEX 45 MIN: CPT

## 2024-01-05 PROCEDURE — 97530 THERAPEUTIC ACTIVITIES: CPT

## 2024-01-05 PROCEDURE — 80048 BASIC METABOLIC PNL TOTAL CA: CPT | Performed by: SPECIALIST

## 2024-01-05 PROCEDURE — 85027 COMPLETE CBC AUTOMATED: CPT | Performed by: SPECIALIST

## 2024-01-05 PROCEDURE — 63600175 PHARM REV CODE 636 W HCPCS: Performed by: SPECIALIST

## 2024-01-05 PROCEDURE — 25000003 PHARM REV CODE 250: Performed by: SPECIALIST

## 2024-01-05 RX ORDER — ASPIRIN 81 MG/1
81 TABLET ORAL EVERY 12 HOURS
Qty: 84 TABLET | Refills: 0 | Status: SHIPPED | OUTPATIENT
Start: 2024-01-05 | End: 2024-02-16

## 2024-01-05 RX ORDER — KETOROLAC TROMETHAMINE 10 MG/1
10 TABLET, FILM COATED ORAL EVERY 8 HOURS
Qty: 15 TABLET | Refills: 0 | Status: SHIPPED | OUTPATIENT
Start: 2024-01-05 | End: 2024-01-10

## 2024-01-05 RX ORDER — POLYETHYLENE GLYCOL 3350 17 G/17G
17 POWDER, FOR SOLUTION ORAL DAILY
Qty: 14 EACH | Refills: 0 | Status: SHIPPED | OUTPATIENT
Start: 2024-01-05 | End: 2024-01-19

## 2024-01-05 RX ORDER — METHOCARBAMOL 750 MG/1
750 TABLET, FILM COATED ORAL EVERY 6 HOURS PRN
Qty: 56 TABLET | Refills: 0 | Status: SHIPPED | OUTPATIENT
Start: 2024-01-05 | End: 2024-01-19

## 2024-01-05 RX ORDER — HYDROCODONE BITARTRATE AND ACETAMINOPHEN 5; 325 MG/1; MG/1
1 TABLET ORAL EVERY 4 HOURS PRN
Status: DISCONTINUED | OUTPATIENT
Start: 2024-01-05 | End: 2024-01-05 | Stop reason: HOSPADM

## 2024-01-05 RX ORDER — HYDROCODONE BITARTRATE AND ACETAMINOPHEN 5; 325 MG/1; MG/1
1 TABLET ORAL EVERY 4 HOURS PRN
Qty: 42 TABLET | Refills: 0 | Status: SHIPPED | OUTPATIENT
Start: 2024-01-05 | End: 2024-01-12

## 2024-01-05 RX ADMIN — METOCLOPRAMIDE 10 MG: 10 TABLET ORAL at 01:01

## 2024-01-05 RX ADMIN — METHOCARBAMOL 750 MG: 750 TABLET ORAL at 10:01

## 2024-01-05 RX ADMIN — ACETAMINOPHEN 500 MG: 500 TABLET ORAL at 01:01

## 2024-01-05 RX ADMIN — DOCUSATE SODIUM 200 MG: 100 CAPSULE, LIQUID FILLED ORAL at 05:01

## 2024-01-05 RX ADMIN — FAMOTIDINE 20 MG: 20 TABLET ORAL at 08:01

## 2024-01-05 RX ADMIN — METOCLOPRAMIDE 10 MG: 10 TABLET ORAL at 05:01

## 2024-01-05 RX ADMIN — CEFAZOLIN 2 G: 2 INJECTION, POWDER, FOR SOLUTION INTRAMUSCULAR; INTRAVENOUS at 05:01

## 2024-01-05 RX ADMIN — HYDROCODONE BITARTRATE AND ACETAMINOPHEN 1 TABLET: 5; 325 TABLET ORAL at 08:01

## 2024-01-05 RX ADMIN — ACETAMINOPHEN 500 MG: 500 TABLET ORAL at 05:01

## 2024-01-05 RX ADMIN — METOPROLOL TARTRATE 75 MG: 50 TABLET, FILM COATED ORAL at 08:01

## 2024-01-05 RX ADMIN — PRAMIPEXOLE DIHYDROCHLORIDE 1.5 MG: 0.25 TABLET ORAL at 08:01

## 2024-01-05 RX ADMIN — ASPIRIN 81 MG CHEWABLE TABLET 81 MG: 81 TABLET CHEWABLE at 08:01

## 2024-01-05 RX ADMIN — SENNOSIDES AND DOCUSATE SODIUM 2 TABLET: 8.6; 5 TABLET ORAL at 08:01

## 2024-01-05 NOTE — PROGRESS NOTES
No acute events overnight.  Pain controlled.  Resting in bed.    Vital Signs  Temp: 99.4 °F (37.4 °C)  Temp Source: Oral  Pulse: 90  Heart Rate Source: Monitor  Resp: 18  SpO2: 96 %  Pulse Oximetry Type: Intermittent  Oxygen Concentration (%): 80  Device (Oxygen Therapy): Face tent  BP: (!) 109/55  BP Location: Right arm  BP Method: Automatic  Patient Position: Lying  Arousal Level: arouses to voice  Height and Weight  Height: 5' (152.4 cm)  Height Method: Stated  Weight: 77.3 kg (170 lb 6.7 oz)  Weight Method: Standard Scale  BSA (Calculated - sq m): 1.81 sq meters  BMI (Calculated): 33.3  Weight in (lb) to have BMI = 25: 127.7]    +FHL/EHL  Brisk capillary refill distally  Dressing c/d/i  Sensation intact to light touch distally    Recent Lab Results         01/05/24  0508   01/04/24  1219        Anion Gap 8.0         BUN 25.1         BUN/CREAT RATIO 28         Calcium 8.7         Chloride 101         CO2 25         Creatinine 0.91         eGFR >60         Glucose 95         Hematocrit 29.7   29.7       Hemoglobin 9.6   9.8       MCH 30.8         MCHC 32.3         MCV 95.2         MPV 9.6         nRBC 0.0         Platelet Count 177         Potassium 4.6         RBC 3.12         RDW 13.5         Sodium 134         WBC 13.90                 A/P:  Status post left total hip arthroplasty  Overall patient doing well.  Therapy for mobility and ambulation.  Aspirin for DVT Ppx  Discharge home after physical therapy later today

## 2024-01-05 NOTE — DISCHARGE INSTRUCTIONS
[facility]  [address]  [place] [address]  Phone [phone]       /      Fax [phone]  SURGEON: Dr. Barone    After discharge, all questions or concerns should be handled at your surgeon's office ([phone]). If it is a weekend or after hours, you will get the surgeon on call.     Discharge Medications:    PAIN MANAGEMENT: Next Dose Available   Toradol/Ketorolac 10mg (Anti-inflammatory) - take every 8 hours, around the clock, for the next 5 days Open    Robaxin/Methocarbamol 750mg (Muscle Relaxer) - Every 6-8 hours AS NEEDED for muscle spasms, thigh pain or additional pain control 1/5/24 @ 6 : 30    Norco 5/325mg (Hydrocodone/Acetaminophen) (Pain Med) - every 4-6 hours AS NEEDED for pain Open    COMPLICATION PREVENTION MEDS: Next Dose DUE   Aspirin 81mg twice a day for 6 weeks post-op for blood clot prevention PM on 1/5/2024   MiraLAX 17gm - once or twice a day while on narcotics and muscle relaxers for constipation prevention PM on 1/5/2024   Take a medication once or twice a day while taking TORADOL and Aspirin at the same time to prevent heartburn PM on 1/5/2024        Total Hip Replacement                                                                                                                                  PAIN MEDICATIONS/PAIN MANAGEMENT: (Use the medication log in your discharge packet to keep track of your medications)  Toradol/Ketorolac 10mg (anti-inflammatory) - take every 8 hours around the clock for the next 5 days.   While on Toradol/Ketorolac and Aspirin (blood thinner) at the same time, take a medication once or twice a day to protect your stomach (for the next 5 days) (Examples: Nexium, Prilosec, Prevacid, Omeprazole, Pepcid...etc). If you start having intolerable stomach issues, discontinue the Toradol completely.   Aside from Toradol, No other anti-inflammatories (Ibuprofen, Aleve, Motrin, Naproxen, Mobic/Meloxicam, Celebrex,  Diclofenac/Voltaren...etc) for 2 weeks or until the wound is healed.      Norco 5/325mg (Hydrocodone/Acetaminophen) (pain pill) - You can take 1 tablet every 4-6 hours for pain. If the pain is mild, take 1/2 of a pill. Once you start taking a half of a pain pill, you can take a Tylenol 325mg with each dose for a little extra pain relief without side effects. Gradually decrease the use as the pain lessens. As you decrease the Norco, increase the Tylenol.      **NO MORE THAN 3000mg OF TYLENOL IN 24 HOURS**.     Robaxin/Methocarbamol 750mg (muscle relaxer)- you can take every 6-8 hours as needed for muscle spasms, thigh pain and stiffness, additional pain control or breakthrough pain medications. This medication is helpful for pain control while lessening your need for narcotics. Please reduce the use gradually as the pain and spasms lessen. DO NOT TAKE AT THE SAME TIME AS A PAIN PILL. YOU WILL BE BETTER SERVED WITH 2 HOURS BETWEEN PAIN PILL AND MUSCLE RELAXER.     **Other things that help with pain control is WALKING, COMPRESSION WRAP, ICE and ELEVATION!!**    BLOOD CLOT PREVENTION:   Aspirin 81mg (blood thinner) - twice a day for 6 weeks for blood clot prevention. Start on the evening of 1/5/24. Stop on 2/16/24.  If you were taking Baby Aspirin 81mg prior to surgery, may return to daily dose AFTER 6 weeks - 2/17/24.    You need to continuing wearing your compression stocking (MIKEY Hose - ThromboEmbolic Disease Prevention Device) for the next 2-6 weeks post-op. It is ok to remove them for hygiene and at bedtime.   Hand wash and Dry. **If the swelling persists in the legs after you stop wearing the Mikey hose, continue to wear them until the swelling decreases.**  REMOVE STOCKINGS AT LEAST DAILY FOR SKIN ASSESSMENT.   Do NOT let the stockings roll down, creating a tourniquet around the back of your knee. If you need to, leave the excess at the bottom of the stocking.   The best thing you can do to prevent blood clots is  to walk around as much as possible, AT LEAST EVERY 1-2 HOURS.       CONSTIPATION PREVENTION:   Miralax or Senokot S/Monica-Colace and Stool softeners EVERY DAY while on pain meds.  Use other more aggressive over the counter LAXATIVES as needed for constipation (Examples: Milk of Magnesia, Dulcolax tabs or suppository, Magnesium Citrate, Fleet's Enema...etc.)   Drink lots of water.  Increase Fiber in diet.  Increase walking distance each day  DO NOT GO MORE THAN 2 DAYS WITHOUT HAVING A BOWEL MOVEMENT!    ACTIVITY:   Weight bearing precautions as follows:  75% Partial weight bearing to operative leg with walker.   HIP PRECAUTIONS:  NO Twisting  NO Leaning past 90 degrees  NO Crossing legs    DO NOT TAKE YOURSELF OFF OF THE WALKER TOO SOON. ALLOW YOUR OUTPATIENT THERAPIST or SURGEON TO GUIDE YOU.   Change positions often throughout the day.   Walk around at least every 1-2 hours while awake.   No heavy lifting, pulling, pushing or straining.  Ice the Hip and thigh AS MUCH AS POSSIBLE  Outpatient Physical Therapy - Bring prescription to clinic of choice as soon as possible.        WOUND CARE:   (Tan) Aquacel Dressing on Operative Hip - 7-Day dressing - Do NOT REMOVE until change date (1/11/24) unless soiled. NO ointments, creams, lotions or antiseptics on the incision. Once removed on the change date, apply occlusive coverlet dressing (long white bandage) and change every other day and as needed for soiling for the next 7 days.     Operative Hip poke hole - Occlusive Coverlet dressing (Small White bandage)  - change every other day and as needed for soiling. NO ointments, creams, lotions or antiseptics on the incision.      NOTIFY MD OF EXCESSIVE WOUND DRAINAGE.    DO NOT PULL ON THE STERI-STRIPS. Allow them to fall off. Ok to wet the wound in 2 weeks post-op - 1/19/24.    DO NOT WET WOUND or apply any ointments, creams, lotions or antiseptics.  Ace wrap - apply your compression stocking and apply the ace wrap where the  stocking stops for extra added compression to the knee.   May wet incision AFTER 2 weeks- (after you follow-up with your surgeon).   Ok to shower before then if able to keep wound from getting wet (plastic barrier, saran wrap or cling wrap and tape).   DO NOT TOUCH INCISION      URINARY RETENTION:  If you start having difficulty urinating, decrease the use of Pain pills and muscle relaxers and notify your primary care doctor.     PNEUMONIA PREVENTION:  Stay out of bed as much as possible and walk around every 1-2 hours.  Continue breathing exercises (Incentive Spirometry) every 1-2 hours while mobility is limited and while you are on pain pills.    FALL PREVENTION:  Wear sturdy shoes that fit well - Wearing shoes with high heels or slippery soles, or shoes that are too loose, can lead to falls. Walking around in bare feet, or only socks, can also increase your risk of falling.  Use walker as long as your surgeon and therapist recommend it  Use good lighting and  throw rugs, electrical cords, furniture and clutter (anything than can cause you to trip at home.   Non-slip rug in bathroom or shower    INFECTION PREVENTION:  Proper handwashing before and after dressing changes. Do not wet the wound. Wound care instructions as written above. NOTIFY MD OF EXCESSIVE WOUND DRAINAGE.  No alcohol, smoking or tobacco products  Pets should not be allowed around the wound or the dressing.   Treat UTI and skin infections as soon as possible.  Pre-medicate with antibiotics prior to dental or surgical procedures.   If you are diabetic, MAINTAIN GOOD BLOOD SUGAR CONTROL (Below 150) DURING YOUR RECOVERY. If you see high numbers, notify your primary care doctor.     Call your SURGEON'S OFFICE ([phone]) if you experience the following signs and symptoms of infection:   Unusual redness, swelling, excessive, cloudy or foul smelling drainage at the incision site.   Persistent low grade temp OR a temp greater than 102 F, unrelieved  by Tylenol  Pain at surgical site, unrelieved by pain meds    Warning signs of a blood clot in your leg: (CALL YOUR SURGEON)  New onset or increasing pain in calf, new onset tenderness or redness above or below the knee or increasing swelling of your calf, ankle, or foot.  Warning signs that a blood clot has traveled to your lungs: (REPORT TO THE ER/CALL 911)  Sudden or increase in Shortness of breath, sudden onset of chest pains, or  Localized chest pain with coughing.       IF ANY ISSUES ARISE AND YOU FEEL THE NEED TO CALL YOUR PRIMARY CARE DOCTOR, PLEASE LET YOUR SURGEON KNOW AS WELL.     For emergencies, please report to OUR ([facility]) Emergency department and tell them to call YOUR SURGEON at [phone].     BEFORE MAKING ANY CHANGES TO THE MEDICAL CARE PLAN OR GOING TO THE EMERGENCY ROOM, PLEASE CONTACT THE SURGEON.    3rd floor nursing unit # [phone]  Use this number for questions about your discharge instructions or problems filling your discharge prescriptions.

## 2024-01-05 NOTE — PLAN OF CARE
Problem: Physical Therapy  Goal: Physical Therapy Goal  Description: Pt will improve functional independence by performing:    Bed mobility: SBA-met  Sit to stand: SBA with rolling walker-met  Bed to chair t/f: SBA with Stand Step  with rolling walker-met  Ambulation x 200'  with SBA with rolling walker-met  1 Step (Curb): Min A  with rolling walker-met  3 Steps: Min A  with L HR-met  Independent with total hip HEP   Outcome: Ongoing, Progressing

## 2024-01-05 NOTE — PLAN OF CARE
Problem: Physical Therapy  Goal: Physical Therapy Goal  Description: Pt will improve functional independence by performing:    Bed mobility: SBA  Sit to stand: SBA with rolling walker  Bed to chair t/f: SBA with Stand Step  with rolling walker  Ambulation x 200'  with SBA with rolling walker  1 Step (Curb): Min A  with rolling walker  3 Steps: Min A  with L HR  Independent with total hip HEP   Outcome: Ongoing, Progressing

## 2024-01-05 NOTE — PT/OT/SLP EVAL
Physical Therapy Evaluation    Patient Name:  Amita Trejo   MRN:  [id number]    Recommendations:     Discharge Recommendations: Low Intensity Therapy   Discharge Equipment Recommendations: walker, rolling   Barriers to discharge: None    Assessment:     Amita Trejo is a 73 y.o. female admitted with a medical diagnosis of Primary osteoarthritis of left hip.  She presents with the following impairments/functional limitations: weakness, impaired endurance, impaired functional mobility, decreased lower extremity function, decreased ROM, pain, edema, orthopedic precautions.    Rehab Prognosis: Good; patient would benefit from acute skilled PT services to address these deficits and reach maximum level of function.    Recent Surgery: Procedure(s) (LRB):  ROBOTIC ARTHROPLASTY,HIP (Left) Day of Surgery    Plan:     During this hospitalization, patient to be seen BID to address the identified rehab impairments via gait training, therapeutic activities, therapeutic exercises and progress toward the following goals:    Plan of Care Expires:  01/10/24    Subjective     Chief Complaint: L hip pain  Patient/Family Comments/goals:   Pain/Comfort:  Location - Side 1: Left  Location 1: hip  Pain Addressed 1: Pre-medicate for activity, Reposition, Distraction, Cessation of Activity    Patients cultural, spiritual, Sikh conflicts given the current situation:      Living Environment:  Pt lives in single story home with , 3 steps to enter with L HR.  Prior to admission, patients level of function was mod I.  Equipment used at home: walker, rolling, cane, straight.  DME owned (not currently used): none.  Upon discharge, patient will have assistance from .    Objective:     Communicated with nurse prior to session.  Patient found supine with peripheral IV  upon PT entry to room.    General Precautions: Standard, fall  Orthopedic Precautions:LLE partial weight bearing, LLE posterior precautions   Braces:     Respiratory Status: Room air    Exams:  RLE ROM: WFL  RLE Strength: WFL  LLE ROM: NT dt sx side  LLE Strength: NT dt sx side    Functional Mobility:  Bed Mobility:     Supine to Sit: stand by assistance  Transfers:     Sit to Stand:  contact guard assistance with rolling walker  Toilet Transfer: contact guard assistance with  rolling walker  using  Step Transfer; +void  Gait: Pt ambulated 200 ft. W rw and CGA, using step through gait pattern at slow pace.        Treatment & Education:  Pt edu on total hip precautions and partial WB status  Pt did not complete prehab prior to sx.    Patient left up in chair with all lines intact, call button in reach, nurse notified, and  present.    GOALS:   Multidisciplinary Problems       Physical Therapy Goals          Problem: Physical Therapy    Goal Priority Disciplines Outcome Goal Variances Interventions   Physical Therapy Goal     PT, PT/OT Ongoing, Progressing     Description: Pt will improve functional independence by performing:    Bed mobility: SBA  Sit to stand: SBA with rolling walker  Bed to chair t/f: SBA with Stand Step  with rolling walker  Ambulation x 200'  with SBA with rolling walker  1 Step (Curb): Min A  with rolling walker  3 Steps: Min A  with L HR  Independent with total hip HEP                        History:     Past Medical History:   Diagnosis Date    Acid reflux     Arthritis     chronic lymphocytic leukemia     Hypertension     Primary osteoarthritis of left hip 01/04/2024    Renal cyst     Sleep apnea     non compliant with cpap       Past Surgical History:   Procedure Laterality Date    BREAST SURGERY Left 1991?    lumpectomy, NO Restrictions    CARPAL TUNNEL RELEASE      CYST REMOVAL      EPIDURAL STEROID INJECTION      JOINT REPLACEMENT  07/21    Left knee    KNEE JOINT MANIPULATION Left 08/29/2022    Procedure: MANIPULATION, KNEE;  Surgeon: Sohan Bowens MD;  Location: [facility];  Service: Orthopedics;  Laterality: Left;    REVISION  OF KNEE ARTHROPLASTY Left 06/08/2022    Procedure: REVISION, ARTHROPLASTY, KNEE;  Surgeon: Sohan Bowens MD;  Location: [facility] OR;  Service: Orthopedics;  Laterality: Left;  FEMUR / PATHOLOGY / ASHLEY    ROBOTIC ARTHROPLASTY, KNEE Right 08/17/2023    Procedure: ROBOTIC ARTHROPLASTY, KNEE, TOTAL;  Surgeon: Isidro Barone MD;  Location: [facility] OR;  Service: Orthopedics;  Laterality: Right;    SHOULDER SURGERY Right     SINUS SURGERY      x 2    SPINE SURGERY  05/21    Bulging disc in lower back    THYROID SURGERY      TONSILLECTOMY         Time Tracking:     PT Received On:    PT Start Time: 1654     PT Stop Time: 1728  PT Total Time (min): 34 min     Billable Minutes: Evaluation 34      01/04/2024

## 2024-01-05 NOTE — PROGRESS NOTES
The patient is status post left total hip arthroplasty postoperative day number 1  Resting in bed, pain controlled  No issues reported by nursing overnight   Her  states that she did well with therapy yesterday    Vital Signs  Temp: 97.7 °F (36.5 °C)  Temp Source: Oral  Pulse: 77  Heart Rate Source: Monitor  Resp: 18  SpO2: 96 %  Pulse Oximetry Type: Intermittent  Oxygen Concentration (%): 80  Device (Oxygen Therapy): Face tent  BP: 104/62  BP Location: Right arm  BP Method: Automatic  Patient Position: Lying  Arousal Level: arouses to voice  Height and Weight  Height: 5' (152.4 cm)  Height Method: Stated  Weight: 77.3 kg (170 lb 6.7 oz)  Weight Method: Standard Scale  BSA (Calculated - sq m): 1.81 sq meters  BMI (Calculated): 33.3  Weight in (lb) to have BMI = 25: 127.7]    +FHL/EHL  BCR distally  Dressing c/d/I  Thigh and calf compartments soft and compressible  SILT distally    Recent Lab Results         01/05/24  0508   01/04/24  1219   01/04/24  0848        Anion Gap 8.0           BUN 25.1           BUN/CREAT RATIO 28           Calcium 8.7           Chloride 101           CO2 25           Creatinine 0.91           eGFR >60           Glucose 95           Hematocrit 29.7   29.7         Hemoglobin 9.6   9.8         MCH 30.8           MCHC 32.3           MCV 95.2           MPV 9.6           nRBC 0.0           Platelet Count 177           POCT Glucose     101       Potassium 4.6           RBC 3.12           RDW 13.5           Sodium 134           WBC 13.90                   A/P:  Status post left total hip arthroplasty postop day 1.  Orthopedically stable  Continue with postoperative care, PT/OT  Aspirin for DVT Ppx  Plan on discharge home later today with outpatient physical therapy  Follow up in the office in 4 weeks

## 2024-01-05 NOTE — PT/OT/SLP EVAL
"Occupational Therapy   Evaluation and Discharge Note    Name: Amita Trejo  MRN: [id number]  Admitting Diagnosis: Primary osteoarthritis of left hip  Recent Surgery: Procedure(s) (LRB):  ROBOTIC ARTHROPLASTY,HIP (Left) 1 Day Post-Op    Recommendations:     Discharge Recommendations: Low Intensity Therapy  Discharge Equipment Recommendations: none  Barriers to discharge:  None    Assessment:     Amita Trejo is a 73 y.o. female with a medical diagnosis of Primary osteoarthritis of left hip. At this time, patient is functioning at their prior level of function and does not require further acute OT services.     Plan:     During this hospitalization, patient does not require further acute OT services.  Please re-consult if situation changes.    Plan of Care Reviewed with: patient, spouse    Subjective     Chief Complaint: No complaints at this time  Patient/Family Comments/goals: "I would like to everything that I can so that I can go home today."    Occupational Profile:  Living Environment: Pt lives in a single-story home with 3-steps to enter with LHR. Pt has grab bars in her walk-in shower.   Previous level of function: Mod I  Roles and Routines: Grandmother  Equipment Used at home: cane, quad, walker, rolling, grab bar, shower chair (elevated toilet, HHS)  Assistance upon Discharge: Pt will have assistance from her  upon d/c.    Pain/Comfort:  Pain Rating 1: 0/10    Patients cultural, spiritual, Rastafari conflicts given the current situation:      Objective:     Communicated with: LORI Vaughn, prior to session.  Patient found up in chair with peripheral IV upon OT entry to room.    General Precautions: Standard, fall  Orthopedic Precautions: LLE partial weight bearing, LLE posterior precautions  Braces:    Respiratory Status: Room air     Occupational Performance:    Functional Mobility/Transfers:  Patient completed Sit <> Stand Transfer with supervision  with  rolling walker   Patient completed Toilet " Transfer Step Transfer technique with stand by assistance with  rolling walker  Patient completed  Shower Transfer Step Transfer technique with contact guard assistance with grab bars using side step-in method.   Patient completed car step t/f technique w/RW w/SBA.  Functional Mobility: Pt completed functional mobility within her room for BADL completion w/RW w/CGA-sup    Activities of Daily Living:  Upper Body Dressing: independence    Lower Body Dressing: modified independence w/AE  Toileting: stand by assistance      Cognitive/Visual Perceptual:  Cognitive/Psychosocial Skills:     -       Oriented to: Person, Place, Time, and Situation   -       Follows Commands/attention:Follows multistep  commands  -       Safety awareness/insight to disability: intact   Visual/Perceptual:      -Intact      Physical Exam:  Upper Extremity Range of Motion:     -       Right Upper Extremity: WFL  -       Left Upper Extremity: WFL  Upper Extremity Strength:    -       Right Upper Extremity: WFL  -       Left Upper Extremity: WFL    Treatment & Education:  Pt received education for LB dressing using AE, safe car t/f including using good body mechanics, side-step-in transfer method for walk-in shower using grab bars to maintain safety. Pt was able to recall 3/3 posterior hip precautions. Pt also received education to prevent falls upon returning home.    Patient left up in chair with all lines intact, call button in reach, with ice pack placed on hip, and spouse present.    GOALS:   Multidisciplinary Problems       Occupational Therapy Goals       Not on file                    History:     Past Medical History:   Diagnosis Date    Acid reflux     Arthritis     chronic lymphocytic leukemia     Hypertension     Primary osteoarthritis of left hip 01/04/2024    Renal cyst     Sleep apnea     non compliant with cpap         Past Surgical History:   Procedure Laterality Date    BREAST SURGERY Left 1991?    lumpectomy, NO Restrictions     CARPAL TUNNEL RELEASE      CYST REMOVAL      EPIDURAL STEROID INJECTION      JOINT REPLACEMENT  07/21    Left knee    KNEE JOINT MANIPULATION Left 08/29/2022    Procedure: MANIPULATION, KNEE;  Surgeon: Sohan Bowens MD;  Location: [facility] OR;  Service: Orthopedics;  Laterality: Left;    REVISION OF KNEE ARTHROPLASTY Left 06/08/2022    Procedure: REVISION, ARTHROPLASTY, KNEE;  Surgeon: Sohan Bowens MD;  Location: [facility] OR;  Service: Orthopedics;  Laterality: Left;  FEMUR / PATHOLOGY / ASHLEY    ROBOTIC ARTHROPLASTY, HIP Left 1/4/2024    Procedure: ROBOTIC ARTHROPLASTY,HIP;  Surgeon: Isidro Barone MD;  Location: [facility] OR;  Service: Orthopedics;  Laterality: Left;    ROBOTIC ARTHROPLASTY, KNEE Right 08/17/2023    Procedure: ROBOTIC ARTHROPLASTY, KNEE, TOTAL;  Surgeon: Isidro Barone MD;  Location: [facility];  Service: Orthopedics;  Laterality: Right;    SHOULDER SURGERY Right     SINUS SURGERY      x 2    SPINE SURGERY  05/21    Bulging disc in lower back    THYROID SURGERY      TONSILLECTOMY         Time Tracking:     OT Date of Treatment: 01/05/24  OT Start Time: 0940  OT Stop Time: 1040  OT Total Time (min): 60 min    Billable Minutes:Evaluation 30  Therapeutic Activity 30    1/5/2024

## 2024-01-05 NOTE — NURSING
Discharge instructions given to the patient . All RX given. Post op instructions given along with dressing supplies. Patient and spouse verbalized understanding .

## 2024-01-05 NOTE — NURSING
Nurses Note -- 4 Eyes      1/5/2024   12:57 PM      Skin assessed during: Daily Assessment      [x] No Altered Skin Integrity Present    []Prevention Measures Documented      [] Yes- Altered Skin Integrity Present or Discovered   [] LDA Added if Not in Epic (Describe Wound)   [] New Altered Skin Integrity was Present on Admit and Documented in LDA   [] Wound Image Taken    Wound Care Consulted? No    Attending Nurse:  Johana Grady RN/Staff Member:   REGINA Finnegan

## 2024-01-05 NOTE — PT/OT/SLP PROGRESS
Physical Therapy Treatment    Patient Name:  Amita Trejo   MRN:  [id number]    Recommendations:     Discharge Recommendations: Low Intensity Therapy  Discharge Equipment Recommendations: walker, rolling  Barriers to discharge: None    Assessment:     Amita Trejo is a 73 y.o. female admitted with a medical diagnosis of Primary osteoarthritis of left hip.  She presents with the following impairments/functional limitations: weakness, impaired endurance, impaired functional mobility, decreased lower extremity function, decreased ROM, pain, edema, orthopedic precautions .    Rehab Prognosis: Good; patient would benefit from acute skilled PT services to address these deficits and reach maximum level of function.    Recent Surgery: Procedure(s) (LRB):  ROBOTIC ARTHROPLASTY,HIP (Left) 1 Day Post-Op    Plan:     During this hospitalization, patient to be seen BID to address the identified rehab impairments via gait training, therapeutic activities, therapeutic exercises and progress toward the following goals:    Plan of Care Expires:  01/10/24    Subjective     Chief Complaint: n/a  Patient/Family Comments/goals: go home  Pain/Comfort:  Pain Rating 1: 3/10  Location - Side 1: Left      Objective:     Communicated with rn prior to session.  Patient found up in chair with   upon PT entry to room.     General Precautions: Standard, fall  Orthopedic Precautions: LLE partial weight bearing, LLE posterior precautions  Braces:    Respiratory Status: Room air     Functional Mobility:  Bed Mobility:     Sit to Supine: stand by assistance  Transfers:     Sit to Stand:  stand by assistance with rolling walker  Bed to Chair: stand by assistance with  rolling walker  using  Step Transfer  Gait: pt amb 200ft w/rw and slow pace. Pt required standing rest breaks due to fatigue.   Stairs:  Pt ascended/descended 3 stair(s) with No Assistive Device with bilateral handrails with Supervision or Set-up Assistance.   Pt ascended/descended a 4  inch curb with supervision/touching assist using RW.       Treatment & Education:   Pt able to verbalize 3/3 precautions. Pt educated and preformed seated ex 10x. Pt educated on importance of frequent mobility.     Patient left supine with all lines intact and call button in reach..    GOALS:   Multidisciplinary Problems       Physical Therapy Goals          Problem: Physical Therapy    Goal Priority Disciplines Outcome Goal Variances Interventions   Physical Therapy Goal     PT, PT/OT Ongoing, Progressing     Description: Pt will improve functional independence by performing:    Bed mobility: SBA-met  Sit to stand: SBA with rolling walker-met  Bed to chair t/f: SBA with Stand Step  with rolling walker-met  Ambulation x 200'  with SBA with rolling walker-met  1 Step (Curb): Min A  with rolling walker-met  3 Steps: Min A  with L HR-met  Independent with total hip HEP                        Time Tracking:     PT Received On:    PT Start Time: 1040     PT Stop Time: 1105  PT Total Time (min): 25 min     Billable Minutes: Therapeutic Activity 25    Treatment Type: Treatment  PT/PTA: PTA     Number of PTA visits since last PT visit: 1 01/05/2024

## 2024-01-05 NOTE — PLAN OF CARE
01/05/24 1041   Discharge Assessment   Assessment Type Discharge Planning Assessment   Source of Information patient;family   Does patient/caregiver understand observation status Yes   Communicated JUAN with patient/caregiver Yes   Reason For Admission s/p THR   People in Home spouse   Do you expect to return to your current living situation? Yes   Do you have help at home or someone to help you manage your care at home? Yes   Who are your caregiver(s) and their phone number(s)? - KAMALJIT [phone]   Prior to hospitilization cognitive status: Alert/Oriented   Current cognitive status: Alert/Oriented   Walking or Climbing Stairs Difficulty yes   Walking or Climbing Stairs ambulation difficulty, requires equipment   Dressing/Bathing Difficulty yes   Dressing/Bathing bathing difficulty, requires equipment   Equipment Currently Used at Home walker, rolling;bedside commode   Readmission within 30 days? No   Patient currently being followed by outpatient case management? No   Do you currently have service(s) that help you manage your care at home? No   Do you take prescription medications? Yes   Do you have prescription coverage? Yes   Do you have any problems affording any of your prescribed medications? No   Is the patient taking medications as prescribed? yes   Who is going to help you get home at discharge?    How do you get to doctors appointments? car, drives self   Are you on dialysis? No   Do you take coumadin? No   Discharge Plan A Home with family   Discharge Plan B Home with family   DME Needed Upon Discharge  walker, rolling;bedside commode;hip kit   Discharge Plan discussed with: Spouse/sig other;Patient   Transition of Care Barriers None     S/p THR. Spk w pt & , Kamaljit...  to asst w homecare. Pt has RW and BSC. Provider list given. Foc obtained.   Called referral for [facility] They will contact pt with appt date & time.   PCP: dR. Arambula  Luci  Contact # Ralph [phone]      Patient DID participate in a pre-op exercise regimen

## 2024-01-08 NOTE — DISCHARGE SUMMARY
[facility]  Discharge Note  Short Stay    Admit Date: 1/4/2024    Discharge Date and Time: 1/5/2024  1:34 PM     Attending Physician: No att. providers found     Discharge Provider: Josh Barone    Diagnoses:  Active Hospital Problems    Diagnosis  POA    *Primary osteoarthritis of left hip [M16.12]  Yes      Resolved Hospital Problems   No resolved problems to display.       Discharged Condition: good    Hospital Course:   Patient presented for elective robotic-assisted left total hip arthroplasty The patient had no complications during the hospital stay and was discharged home in stable condition partial weightbearing with the assistance of a walker. Consults for physical therapy and rehab were given to the patient as well as a follow-up appointment in our office in 4 weeks for reevaluation. The patient was instructed on wound care and dressing changes as well as to continue AUGUSTA hose while at home. Prescriptions were given for continued DVT prophylaxis and pain control. Home medications were resumed please see discharge med rec for these.     Final Diagnoses: Same as principal problem.    Disposition: Home or Self Care    Follow up/Patient Instructions:    Medications:  Reconciled Home Medications:      Medication List        START taking these medications      aspirin 81 MG EC tablet  Commonly known as: ECOTRIN  Take 1 tablet (81 mg total) by mouth every 12 (twelve) hours.     HYDROcodone-acetaminophen 5-325 mg per tablet  Commonly known as: NORCO  Take 1 tablet by mouth every 4 (four) hours as needed for Pain.     ketorolac 10 mg tablet  Commonly known as: TORADOL  Take 1 tablet (10 mg total) by mouth every 8 (eight) hours. for 5 days     methocarbamoL 750 MG Tab  Commonly known as: ROBAXIN  Take 1 tablet (750 mg total) by mouth every 6 (six) hours as needed (muscle spasms).     polyethylene glycol 17 gram Pwpk  Commonly known as: GLYCOLAX  Take 17 g by mouth once daily. for 14 days             CONTINUE taking these medications      amLODIPine 5 MG tablet  Commonly known as: NORVASC  Take 1 tablet (5 mg total) by mouth every evening.     B COMPLEX 1 ORAL  Take 1 tablet by mouth once daily.     B-complex with vitamin C tablet  Commonly known as: Z-Bec or Equiv  Take 1 tablet by mouth once daily.     cholecalciferol (vitamin D3) 125 mcg (5,000 unit) capsule  Take 5,000 Units by mouth once daily.     lisdexamfetamine 30 MG capsule  Commonly known as: VYVANSE  Take 30 mg by mouth every morning.     metoprolol tartrate 50 MG tablet  Commonly known as: LOPRESSOR  Take 75 mg by mouth once daily at 6am.     pantoprazole 40 MG tablet  Commonly known as: PROTONIX  40 mg once daily.     polysaccharide iron complex 200 mg iron Cap  Take 1 capsule by mouth Daily.     pramipexole 1.5 MG tablet  Commonly known as: MIRAPEX  Take 1.5 mg by mouth 2 (two) times a day.     telmisartan-hydrochlorothiazide 40-12.5 mg per tablet  Commonly known as: MICARDIS HCT  Take 1 tablet by mouth once daily.            No discharge procedures on file.   Follow-up Information       Isidro Barone MD. Go on 2/5/2024.    Specialty: Orthopedic Surgery  Why: Ortho follow up appt on Monday 2/5/24 @ 8:15am jose/ Gustabo (Dr Barone's Phy Assistant)  Contact information:  [address]  [address]  [facility] [address]  [phone]               [facility] Follow up.    Specialty: Physical Therapy  Why: This is the outpatient therapy facility. They will contact pt with appt date & time. Call if you have questions or concerns.  Contact information:  [address]  [address]  [facility] [address]  [phone]                             Discharge Procedure Orders (must include Diet, Follow-up, Activity):  No discharge procedures on file.

## 2024-01-09 ENCOUNTER — TELEPHONE (OUTPATIENT)
Dept: ORTHOPEDICS | Facility: CLINIC | Age: 74
End: 2024-01-09
Payer: COMMERCIAL

## 2024-01-09 ENCOUNTER — PATIENT OUTREACH (OUTPATIENT)
Dept: ADMINISTRATIVE | Facility: CLINIC | Age: 74
End: 2024-01-09
Payer: COMMERCIAL

## 2024-01-09 NOTE — TELEPHONE ENCOUNTER
Patient called stating that she is having some bleeding under big bandage on her hip and she would like to know if she needs to leave it on like this until the 2 week ivory, or if there is something else that she can do.     She also stated that the small bandage is irritating her skin after doing every other day dressing changes.     I called the patient to let her know that I received her voice mail and that I will speak with the provider and call her back with a response.     She verbalized a clear understanding.

## 2024-01-09 NOTE — PROGRESS NOTES
C3 nurse spoke with Amita Trejo for a TCC post hospital discharge follow up call. The patient does not have a scheduled HOSFU appointment with Tyesha Verma MD within 5-7 days post hospital discharge date 1/5/24. C3 nurse was unable to schedule HOSFU appointment in Epic or route message to PCP.   Patient advised to schedule appt within 5-7 days of discharge.  The patient does have a POST OP appt with Isidro Barone MD (Orthopedic Surgery) on 2/5/24 @ 8:15am.

## 2024-01-10 NOTE — TELEPHONE ENCOUNTER
I called the patient to relay Gustabo's message.    She stated that she is unsure at the moment of how to upload photos, so she asked if she can send the pictures through email.     I let her know that this is fine and that I will send Gustabo the imaging and call her back.   I also let her know about the small bandage, and she verbalized a clear understanding.

## 2024-01-10 NOTE — TELEPHONE ENCOUNTER
Gustabo looked at the imaging and he stated for the patient to take the aqua cell dressing off and replace it with dry island dressing changed every other day unless they need to change the dressing sooner. If the dressing needs to be changed sooner due to the draining, then the patient is advised to call the office and update the provider, redress the incision, and follow up in the office on 01/12/24.     I called the patient and relayed this message.     She verbalized a clear understanding.

## 2024-01-13 LAB — VIEW PATHOLOGY REPORT (RELIAPATH): NORMAL

## 2024-02-05 ENCOUNTER — OFFICE VISIT (OUTPATIENT)
Dept: ORTHOPEDICS | Facility: CLINIC | Age: 74
End: 2024-02-05
Payer: MEDICARE

## 2024-02-05 ENCOUNTER — HOSPITAL ENCOUNTER (OUTPATIENT)
Dept: RADIOLOGY | Facility: CLINIC | Age: 74
Discharge: HOME | End: 2024-02-05
Attending: PHYSICIAN ASSISTANT
Payer: MEDICARE

## 2024-02-05 VITALS
HEART RATE: 70 BPM | TEMPERATURE: 98 F | HEIGHT: 60 IN | BODY MASS INDEX: 32.39 KG/M2 | DIASTOLIC BLOOD PRESSURE: 76 MMHG | SYSTOLIC BLOOD PRESSURE: 139 MMHG | WEIGHT: 165 LBS

## 2024-02-05 DIAGNOSIS — Z96.642 S/P TOTAL LEFT HIP ARTHROPLASTY: Primary | ICD-10-CM

## 2024-02-05 DIAGNOSIS — Z96.642 S/P TOTAL LEFT HIP ARTHROPLASTY: ICD-10-CM

## 2024-02-05 PROCEDURE — 73502 X-RAY EXAM HIP UNI 2-3 VIEWS: CPT | Mod: LT,,, | Performed by: PHYSICIAN ASSISTANT

## 2024-02-05 PROCEDURE — 99024 POSTOP FOLLOW-UP VISIT: CPT | Mod: POP,,, | Performed by: PHYSICIAN ASSISTANT

## 2024-02-05 RX ORDER — HYDROCODONE BITARTRATE AND ACETAMINOPHEN 5; 325 MG/1; MG/1
1 TABLET ORAL EVERY 6 HOURS PRN
COMMUNITY

## 2024-02-05 NOTE — PROGRESS NOTES
Chief Complaint:   Chief Complaint   Patient presents with    Left Hip - Post-op Evaluation     Pt states she is feeling very good regarding her Lt hip. Pt is attending PT 3x a week. Pt denies pain or discomfort.       History of present illness:    73-year-old female presents office today for a follow-up on her left hip.  She is one-month S/P left total hip arthroplasty overall doing well.  Pain is controlled.  She is ambulating with a walker and attending physical therapy    Past Medical History:   Diagnosis Date    Acid reflux     Arthritis     chronic lymphocytic leukemia     Hypertension     Primary osteoarthritis of left hip 01/04/2024    Renal cyst     Sleep apnea     non compliant with cpap       Past Surgical History:   Procedure Laterality Date    BREAST SURGERY Left 1991?    lumpectomy, NO Restrictions    CARPAL TUNNEL RELEASE      CYST REMOVAL      EPIDURAL STEROID INJECTION      JOINT REPLACEMENT  07/21    Left knee    KNEE JOINT MANIPULATION Left 08/29/2022    Procedure: MANIPULATION, KNEE;  Surgeon: Sohan Bowens MD;  Location: [facility] OR;  Service: Orthopedics;  Laterality: Left;    REVISION OF KNEE ARTHROPLASTY Left 06/08/2022    Procedure: REVISION, ARTHROPLASTY, KNEE;  Surgeon: Sohan Bowens MD;  Location: [facility] OR;  Service: Orthopedics;  Laterality: Left;  FEMUR / PATHOLOGY / ASHLEY    ROBOTIC ARTHROPLASTY, HIP Left 1/4/2024    Procedure: ROBOTIC ARTHROPLASTY,HIP;  Surgeon: Isidro Barone MD;  Location: [facility] OR;  Service: Orthopedics;  Laterality: Left;    ROBOTIC ARTHROPLASTY, KNEE Right 08/17/2023    Procedure: ROBOTIC ARTHROPLASTY, KNEE, TOTAL;  Surgeon: Isidro Barone MD;  Location: [facility] OR;  Service: Orthopedics;  Laterality: Right;    SHOULDER SURGERY Right     SINUS SURGERY      x 2    SPINE SURGERY  05/21    Bulging disc in lower back    THYROID SURGERY      TONSILLECTOMY         Current Outpatient Medications   Medication Sig    amLODIPine (NORVASC) 5 MG tablet Take 1 tablet (5 mg  total) by mouth every evening.    aspirin (ECOTRIN) 81 MG EC tablet Take 1 tablet (81 mg total) by mouth every 12 (twelve) hours.    B-complex with vitamin C (Z-BEC OR EQUIV) tablet Take 1 tablet by mouth once daily.    cholecalciferol, vitamin D3, 125 mcg (5,000 unit) capsule Take 5,000 Units by mouth once daily.    lisdexamfetamine (VYVANSE) 30 MG capsule Take 30 mg by mouth every morning.    metoprolol tartrate (LOPRESSOR) 50 MG tablet Take 75 mg by mouth once daily at 6am.    pantoprazole (PROTONIX) 40 MG tablet 40 mg once daily.    polysaccharide iron complex 200 mg iron Cap Take 1 capsule by mouth Daily.    pramipexole (MIRAPEX) 1.5 MG tablet Take 1.5 mg by mouth 2 (two) times a day.    telmisartan-hydrochlorothiazide (MICARDIS HCT) 40-12.5 mg per tablet Take 1 tablet by mouth once daily.    vitamin B complex (B COMPLEX 1 ORAL) Take 1 tablet by mouth once daily.    HYDROcodone-acetaminophen (NORCO) 5-325 mg per tablet Take 1 tablet by mouth every 6 (six) hours as needed for Pain.     No current facility-administered medications for this visit.     Facility-Administered Medications Ordered in Other Visits   Medication    vancomycin (VANCOCIN) 1,000 mg in dextrose 5 % (D5W) 250 mL IVPB (Vial-Mate)       Review of patient's allergies indicates:   Allergen Reactions    Duloxetine      Other reaction(s): Bradycardia, Hypotension, Vomit    Lorazepam      Other reaction(s): Confusion  amnesia      Tramadol Other (See Comments)     nightmares       Family History   Problem Relation Age of Onset    Hypertension Mother     COPD Mother     Hearing loss Mother     Hypertension Father     COPD Father     Hearing loss Father     Breast cancer Sister     Cancer Sister     Diabetes Maternal Grandmother         Reina    Diabetes Paternal Grandmother        Social History     Socioeconomic History    Marital status:    Tobacco Use    Smoking status: Former     Current packs/day: 0.00     Average packs/day: 1 pack/day  for 10.0 years (10.0 ttl pk-yrs)     Types: Cigarettes     Start date: 3/18/1976     Quit date: 3/18/1986     Years since quittin.9    Smokeless tobacco: Former   Substance and Sexual Activity    Alcohol use: Not Currently    Drug use: Never    Sexual activity: Yes     Partners: Male     Birth control/protection: Post-menopausal         Review of Systems:    Constitution:   Denies chills, fever, and sweats.  HENT:   Denies headaches or blurry vision.  Cardiovascular:  Denies chest pain or irregular heart beat.  Respiratory:   Denies cough or shortness of breath.  Gastrointestinal:  Denies abdominal pain, nausea, or vomiting.  Musculoskeletal:   Denies muscle cramps.  Neurological:   Denies dizziness or focal weakness.  Psychiatric/Behavior: Normal mental status.  Hematology/Lymph:  Denies bleeding problem or easy bruising/bleeding.  Skin:    Denies rash or suspicious lesions.    Examination:    Vital Signs:    Vitals:    24 0802   BP: 139/76   Pulse: 70   Temp: 98 °F (36.7 °C)   TempSrc: Oral   Weight: 74.8 kg (165 lb)   Height: 5' (1.524 m)       Body mass index is 32.22 kg/m².    Constitution:   Well-developed, well nourished patient in no acute distress.  Neurological:   Alert and oriented x 3 and cooperative to examination.     Psychiatric/Behavior: Normal mental status.  Respiratory:   No shortness of breath.  Nonlabored breathing  Cardiovascular:           Regular rate and rhythm  Eyes:    Extraoccular muscles intact  Skin:    No scars, rash or suspicious lesions.    Physical Exam:     Left Hip    No swelling, redness or increased heat.    Wound healing normal. Surgical incision C/D/I     Motion was normal.     Weakness of the  hip was observed.    Sensation intact distally    Intact pedal pulses    X-Ray Hip:   Complete left hip x-ray with two or more views of the AP and lateral aspects were performed.   Impressions Radiology Test   X-ray of hip was performed showed intact  hip  prosthesis        Assessment:     S/P total left hip arthroplasty  -     X-Ray Hip 2 or 3 views Left (with Pelvis when performed); Future; Expected date: 02/05/2024        Plan:      She will continue with physical therapy, full weight-bearing as tolerated.  She will transition from a walker to a cane once comfortable and follow up in 3 months for repeat evaluation        Follow up in about 3 months (around 5/5/2024).    DISCLAIMER: This note may have been dictated using voice recognition software and may contain grammatical errors.

## 2024-02-15 ENCOUNTER — OFFICE VISIT (OUTPATIENT)
Dept: HEMATOLOGY/ONCOLOGY | Facility: CLINIC | Age: 74
End: 2024-02-15
Payer: MEDICARE

## 2024-02-15 VITALS
RESPIRATION RATE: 18 BRPM | HEART RATE: 74 BPM | TEMPERATURE: 98 F | WEIGHT: 168.69 LBS | SYSTOLIC BLOOD PRESSURE: 128 MMHG | DIASTOLIC BLOOD PRESSURE: 73 MMHG | OXYGEN SATURATION: 100 % | BODY MASS INDEX: 33.12 KG/M2 | HEIGHT: 60 IN

## 2024-02-15 DIAGNOSIS — C83.00 LYMPHOMA, SMALL LYMPHOCYTIC: Primary | ICD-10-CM

## 2024-02-15 PROCEDURE — 99214 OFFICE O/P EST MOD 30 MIN: CPT | Mod: ,,, | Performed by: STUDENT IN AN ORGANIZED HEALTH CARE EDUCATION/TRAINING PROGRAM

## 2024-04-08 ENCOUNTER — OFFICE VISIT (OUTPATIENT)
Dept: ORTHOPEDICS | Facility: CLINIC | Age: 74
End: 2024-04-08
Payer: MEDICARE

## 2024-04-08 ENCOUNTER — HOSPITAL ENCOUNTER (OUTPATIENT)
Dept: RADIOLOGY | Facility: CLINIC | Age: 74
Discharge: HOME | End: 2024-04-08
Attending: PHYSICIAN ASSISTANT
Payer: MEDICARE

## 2024-04-08 VITALS
BODY MASS INDEX: 33.14 KG/M2 | DIASTOLIC BLOOD PRESSURE: 73 MMHG | HEIGHT: 60 IN | HEART RATE: 71 BPM | SYSTOLIC BLOOD PRESSURE: 128 MMHG | WEIGHT: 168.81 LBS

## 2024-04-08 DIAGNOSIS — Z96.651 AFTERCARE FOLLOWING RIGHT KNEE JOINT REPLACEMENT SURGERY: Primary | ICD-10-CM

## 2024-04-08 DIAGNOSIS — Z47.1 AFTERCARE FOLLOWING RIGHT KNEE JOINT REPLACEMENT SURGERY: ICD-10-CM

## 2024-04-08 DIAGNOSIS — Z96.651 AFTERCARE FOLLOWING RIGHT KNEE JOINT REPLACEMENT SURGERY: ICD-10-CM

## 2024-04-08 DIAGNOSIS — M76.899 HAMSTRING TENDONITIS: ICD-10-CM

## 2024-04-08 DIAGNOSIS — Z47.1 AFTERCARE FOLLOWING RIGHT KNEE JOINT REPLACEMENT SURGERY: Primary | ICD-10-CM

## 2024-04-08 PROCEDURE — 73562 X-RAY EXAM OF KNEE 3: CPT | Mod: RT,,, | Performed by: PHYSICIAN ASSISTANT

## 2024-04-08 PROCEDURE — 99213 OFFICE O/P EST LOW 20 MIN: CPT | Mod: ,,, | Performed by: SPECIALIST

## 2024-04-08 NOTE — PROGRESS NOTES
"  Chief Complaint:   Chief Complaint   Patient presents with    Right Knee - Pain, Follow-up     Pt states she was doing really good in regards her knee, but since February she has been experiencing a "nerve pain" in the back of her knee. Pt states that when she tries to do some movements her knee pops and "catches" at times. Pain radiates into her calf.        History of present illness:    73-year-old female presents office today for evaluation for right knee pain.  She notes some pain in the posterior lateral aspect of the right knee that has been present for 2 months.  She describes it as a nerve pain.  She states it does radiate into the calf at times.  She underwent right total knee arthroplasty in August 2023 and 3 months S/P left total hip arthroplasty.  She has doing great regarding her left hip.  She is ambulating with a cane.  She was doing great up until 2 months ago when this pain started.  She denies any history of injury    Past Medical History:   Diagnosis Date    Acid reflux     Arthritis     chronic lymphocytic leukemia     Hypertension     Primary osteoarthritis of left hip 01/04/2024    Renal cyst     Sleep apnea     non compliant with cpap       Past Surgical History:   Procedure Laterality Date    BREAST SURGERY Left 1991?    lumpectomy, NO Restrictions    CARPAL TUNNEL RELEASE      CYST REMOVAL      EPIDURAL STEROID INJECTION      HIP REPLACEMENT ARTHROPLASTY Left 01/04/2024    JOINT REPLACEMENT  07/21    Left knee    KNEE JOINT MANIPULATION Left 08/29/2022    Procedure: MANIPULATION, KNEE;  Surgeon: Sohan Bowens MD;  Location: [facility] OR;  Service: Orthopedics;  Laterality: Left;    REVISION OF KNEE ARTHROPLASTY Left 06/08/2022    Procedure: REVISION, ARTHROPLASTY, KNEE;  Surgeon: Sohan Bowens MD;  Location: [facility] OR;  Service: Orthopedics;  Laterality: Left;  FEMUR / PATHOLOGY / ASHLEY    ROBOTIC ARTHROPLASTY, HIP Left 01/04/2024    Procedure: ROBOTIC ARTHROPLASTY,HIP;  Surgeon: " Isidro Barone MD;  Location: [facility] OR;  Service: Orthopedics;  Laterality: Left;    ROBOTIC ARTHROPLASTY, KNEE Right 08/17/2023    Procedure: ROBOTIC ARTHROPLASTY, KNEE, TOTAL;  Surgeon: Isidro Barone MD;  Location: [facility] OR;  Service: Orthopedics;  Laterality: Right;    SHOULDER SURGERY Right     SINUS SURGERY      x 2    SPINE SURGERY  05/21    Bulging disc in lower back    THYROID SURGERY      TONSILLECTOMY         Current Outpatient Medications   Medication Sig    amLODIPine (NORVASC) 5 MG tablet Take 1 tablet (5 mg total) by mouth every evening.    B-complex with vitamin C (Z-BEC OR EQUIV) tablet Take 1 tablet by mouth once daily.    cholecalciferol, vitamin D3, 125 mcg (5,000 unit) capsule Take 5,000 Units by mouth once daily.    lisdexamfetamine (VYVANSE) 30 MG capsule Take 30 mg by mouth every morning.    metoprolol tartrate (LOPRESSOR) 50 MG tablet Take 75 mg by mouth once daily at 6am.    pantoprazole (PROTONIX) 40 MG tablet 40 mg once daily.    polysaccharide iron complex 200 mg iron Cap Take 1 capsule by mouth Daily.    pramipexole (MIRAPEX) 1.5 MG tablet Take 1.5 mg by mouth 2 (two) times a day.    telmisartan-hydrochlorothiazide (MICARDIS HCT) 40-12.5 mg per tablet Take 1 tablet by mouth once daily.    vitamin B complex (B COMPLEX 1 ORAL) Take 1 tablet by mouth once daily.    aspirin (ECOTRIN) 81 MG EC tablet Take 1 tablet (81 mg total) by mouth every 12 (twelve) hours.    HYDROcodone-acetaminophen (NORCO) 5-325 mg per tablet Take 1 tablet by mouth every 6 (six) hours as needed for Pain. (Patient not taking: Reported on 4/8/2024)     No current facility-administered medications for this visit.     Facility-Administered Medications Ordered in Other Visits   Medication    vancomycin (VANCOCIN) 1,000 mg in dextrose 5 % (D5W) 250 mL IVPB (Vial-Mate)       Review of patient's allergies indicates:   Allergen Reactions    Duloxetine      Other reaction(s): Bradycardia, Hypotension, Vomit    Lorazepam       Other reaction(s): Confusion  amnesia      Tramadol Other (See Comments)     nightmares       Family History   Problem Relation Age of Onset    Hypertension Mother     COPD Mother     Hearing loss Mother     Hypertension Father     COPD Father     Hearing loss Father     Breast cancer Sister     Cancer Sister     Diabetes Maternal Grandmother         Reina    Diabetes Paternal Grandmother        Social History     Socioeconomic History    Marital status:    Tobacco Use    Smoking status: Former     Current packs/day: 0.00     Average packs/day: 1 pack/day for 10.0 years (10.0 ttl pk-yrs)     Types: Cigarettes     Start date: 3/18/1976     Quit date: 3/18/1986     Years since quittin.0    Smokeless tobacco: Former   Substance and Sexual Activity    Alcohol use: Not Currently    Drug use: Never    Sexual activity: Yes     Partners: Male     Birth control/protection: Post-menopausal         Review of Systems:    Constitution:   Denies chills, fever, and sweats.  HENT:   Denies headaches or blurry vision.  Cardiovascular:  Denies chest pain or irregular heart beat.  Respiratory:   Denies cough or shortness of breath.  Gastrointestinal:  Denies abdominal pain, nausea, or vomiting.  Musculoskeletal:   Denies muscle cramps.  Neurological:   Denies dizziness or focal weakness.  Psychiatric/Behavior: Normal mental status.  Hematology/Lymph:  Denies bleeding problem or easy bruising/bleeding.  Skin:    Denies rash or suspicious lesions.    Examination:    Vital Signs:    Vitals:    24 1059   BP: 128/73   Pulse: 71   Weight: 76.6 kg (168 lb 12.8 oz)   Height: 5' (1.524 m)   PainSc:   5       Body mass index is 32.97 kg/m².    Constitution:   Well-developed, well nourished patient in no acute distress.  Neurological:   Alert and oriented x 3 and cooperative to examination.     Psychiatric/Behavior: Normal mental status.  Respiratory:   No shortness of breath.  Nonlabored breathing  Cardiovascular:            Regular rate and rhythm  Eyes:    Extraoccular muscles intact  Skin:    No scars, rash or suspicious lesions.    Physical Exam:     Right Knee     No swelling, warmth  She has some tenderness over the biceps femoris insertion    Well healed scar    No instability of the knee in mid or deep flexion     Active flexion 120 degrees     Active extension 0 degrees     No weakness was observed.    Sensation intact distally    Intact pedal pulses     A complete knee x-ray with standing 3 views was performed -of right knee.     Impressions Radiology Test   X-ray of knee was performed intact  knee implant without signs of loosening or subsidence.        Assessment:     Aftercare following right knee joint replacement surgery  -     X-Ray Knee 3 View Right; Future; Expected date: 04/08/2024        Plan:      Discussed exam and x-ray findings with the patient today.  She has some hamstring tendinitis of the biceps femoris.  Recommend some home exercises, Voltaren gel and we will provide her with a prescription for physical therapy if no improvement with home exercise.  She will follow up as needed.  She does have a previously scheduled appointment for her left hip in May which she will keep.        No follow-ups on file.    DISCLAIMER: This note may have been dictated using voice recognition software and may contain grammatical errors.

## 2024-05-13 ENCOUNTER — OFFICE VISIT (OUTPATIENT)
Dept: ORTHOPEDICS | Facility: CLINIC | Age: 74
End: 2024-05-13
Payer: MEDICARE

## 2024-05-13 VITALS
DIASTOLIC BLOOD PRESSURE: 68 MMHG | SYSTOLIC BLOOD PRESSURE: 119 MMHG | WEIGHT: 168.88 LBS | BODY MASS INDEX: 33.15 KG/M2 | HEART RATE: 79 BPM | HEIGHT: 60 IN

## 2024-05-13 DIAGNOSIS — M76.899 HAMSTRING TENDONITIS: ICD-10-CM

## 2024-05-13 DIAGNOSIS — Z96.651 AFTERCARE FOLLOWING RIGHT KNEE JOINT REPLACEMENT SURGERY: ICD-10-CM

## 2024-05-13 DIAGNOSIS — Z96.642 S/P TOTAL LEFT HIP ARTHROPLASTY: Primary | ICD-10-CM

## 2024-05-13 DIAGNOSIS — Z47.1 AFTERCARE FOLLOWING RIGHT KNEE JOINT REPLACEMENT SURGERY: ICD-10-CM

## 2024-05-13 PROCEDURE — 99213 OFFICE O/P EST LOW 20 MIN: CPT | Mod: ,,, | Performed by: PHYSICIAN ASSISTANT

## 2024-05-13 NOTE — PROGRESS NOTES
Chief Complaint:   Chief Complaint   Patient presents with    Pain     Lt ana & rt knee- States lt hip is doing well no complains but behind rt knee is in pain travels down into heel. States is doing at home exercises but has not helped. Is taking tylenol but does not help with pain. Is using cane to walk with. Denies any swelling or numbness.        History of present illness:    73-year-old female presents office today for three-month follow-up on her left hip.  She is 4 months S/P left total hip arthroplasty doing great.  She is complaining of some posterior lateral right knee pain.  She was diagnosed with some hamstring tendinitis at last visit and given home exercises.  No real improvement thus far.  She is 8-9 months S/P right total knee arthroplasty    Past Medical History:   Diagnosis Date    Acid reflux     Arthritis     chronic lymphocytic leukemia     Hypertension     Primary osteoarthritis of left hip 01/04/2024    Renal cyst     Sleep apnea     non compliant with cpap       Past Surgical History:   Procedure Laterality Date    BREAST SURGERY Left 1991?    lumpectomy, NO Restrictions    CARPAL TUNNEL RELEASE      CYST REMOVAL      EPIDURAL STEROID INJECTION      HIP REPLACEMENT ARTHROPLASTY Left 01/04/2024    JOINT REPLACEMENT  07/21    Left knee    KNEE JOINT MANIPULATION Left 08/29/2022    Procedure: MANIPULATION, KNEE;  Surgeon: Sohan Bowens MD;  Location: [facility] OR;  Service: Orthopedics;  Laterality: Left;    REVISION OF KNEE ARTHROPLASTY Left 06/08/2022    Procedure: REVISION, ARTHROPLASTY, KNEE;  Surgeon: Sohan Bowens MD;  Location: [facility] OR;  Service: Orthopedics;  Laterality: Left;  FEMUR / PATHOLOGY / ASHLEY    ROBOTIC ARTHROPLASTY, HIP Left 01/04/2024    Procedure: ROBOTIC ARTHROPLASTY,HIP;  Surgeon: Isidro Barone MD;  Location: [facility] OR;  Service: Orthopedics;  Laterality: Left;    ROBOTIC ARTHROPLASTY, KNEE Right 08/17/2023    Procedure: ROBOTIC ARTHROPLASTY, KNEE, TOTAL;  Surgeon:  Isidro Barone MD;  Location: [facility];  Service: Orthopedics;  Laterality: Right;    SHOULDER SURGERY Right     SINUS SURGERY      x 2    SPINE SURGERY  05/21    Bulging disc in lower back    THYROID SURGERY      TONSILLECTOMY         Current Outpatient Medications   Medication Sig    amLODIPine (NORVASC) 5 MG tablet Take 1 tablet (5 mg total) by mouth every evening.    B-complex with vitamin C (Z-BEC OR EQUIV) tablet Take 1 tablet by mouth once daily.    cholecalciferol, vitamin D3, 125 mcg (5,000 unit) capsule Take 5,000 Units by mouth once daily.    lisdexamfetamine (VYVANSE) 30 MG capsule Take 30 mg by mouth every morning.    metoprolol tartrate (LOPRESSOR) 50 MG tablet Take 75 mg by mouth once daily at 6am.    multivitamin with minerals tablet Take 1 tablet by mouth once daily.    pantoprazole (PROTONIX) 40 MG tablet 40 mg once daily.    polysaccharide iron complex 200 mg iron Cap Take 1 capsule by mouth Daily.    pramipexole (MIRAPEX) 1.5 MG tablet Take 1.5 mg by mouth 2 (two) times a day.    telmisartan-hydrochlorothiazide (MICARDIS HCT) 40-12.5 mg per tablet Take 1 tablet by mouth once daily.    vitamin B complex (B COMPLEX 1 ORAL) Take 1 tablet by mouth once daily.    aspirin (ECOTRIN) 81 MG EC tablet Take 1 tablet (81 mg total) by mouth every 12 (twelve) hours.    HYDROcodone-acetaminophen (NORCO) 5-325 mg per tablet Take 1 tablet by mouth every 6 (six) hours as needed for Pain. (Patient not taking: Reported on 4/8/2024)     No current facility-administered medications for this visit.     Facility-Administered Medications Ordered in Other Visits   Medication    vancomycin (VANCOCIN) 1,000 mg in dextrose 5 % (D5W) 250 mL IVPB (Vial-Mate)       Review of patient's allergies indicates:   Allergen Reactions    Duloxetine      Other reaction(s): Bradycardia, Hypotension, Vomit    Lorazepam      Other reaction(s): Confusion  amnesia      Tramadol Other (See Comments)     nightmares       Family History   Problem  Relation Name Age of Onset    Hypertension Mother Carly     COPD Mother Carly     Hearing loss Mother Carly     Hypertension Father Kevin     COPD Father Kevin     Hearing loss Father Kevin     Breast cancer Sister Jacquelyn     Cancer Sister Jacquelyn     Diabetes Maternal Grandmother Linda Huang    Diabetes Paternal Grandmother Reina        Social History     Socioeconomic History    Marital status:    Tobacco Use    Smoking status: Former     Current packs/day: 0.00     Average packs/day: 1 pack/day for 10.0 years (10.0 ttl pk-yrs)     Types: Cigarettes     Start date: 3/18/1976     Quit date: 3/18/1986     Years since quittin.1    Smokeless tobacco: Former   Substance and Sexual Activity    Alcohol use: Not Currently    Drug use: Never    Sexual activity: Yes     Partners: Male     Birth control/protection: Post-menopausal         Review of Systems:    Constitution:   Denies chills, fever, and sweats.  HENT:   Denies headaches or blurry vision.  Cardiovascular:  Denies chest pain or irregular heart beat.  Respiratory:   Denies cough or shortness of breath.  Gastrointestinal:  Denies abdominal pain, nausea, or vomiting.  Musculoskeletal:   Denies muscle cramps.  Neurological:   Denies dizziness or focal weakness.  Psychiatric/Behavior: Normal mental status.  Hematology/Lymph:  Denies bleeding problem or easy bruising/bleeding.  Skin:    Denies rash or suspicious lesions.    Examination:    Vital Signs:    Vitals:    24 0832   BP: 119/68   Pulse: 79   Weight: 76.6 kg (168 lb 14 oz)   Height: 5' (1.524 m)       Body mass index is 32.98 kg/m².    Constitution:   Well-developed, well nourished patient in no acute distress.  Neurological:   Alert and oriented x 3 and cooperative to examination.     Psychiatric/Behavior: Normal mental status.  Respiratory:   No shortness of breath.  Nonlabored breathing  Cardiovascular:           Regular rate and rhythm  Eyes:    Extraoccular muscles  intact  Skin:    No scars, rash or suspicious lesions.    Physical Exam:     Left Hip     No swelling,erythema    Well healed scar    No tenderness or instability of the hips was noted. Motion was normal. No weakness observed.      Right Knee     No swelling, warmth    Tender over the biceps femoris at its insertion    Well healed scar    No instability of the knee in mid or deep flexion     Active flexion 120 degrees     Active extension 0 degrees     No weakness observed.        Assessment:     S/P total left hip arthroplasty    Hamstring tendonitis  -     Ambulatory referral/consult to Physical/Occupational Therapy; Future; Expected date: 05/20/2024    Aftercare following right knee joint replacement surgery  -     Ambulatory referral/consult to Physical/Occupational Therapy; Future; Expected date: 05/20/2024        Plan:      She is doing great regarding her left hip.  We will initiate some physical therapy for her right knee hamstring tendinitis.  They will include some dry needling.  I will give her a 6 week follow-up for her right knee        Follow up in about 6 weeks (around 6/24/2024).    DISCLAIMER: This note may have been dictated using voice recognition software and may contain grammatical errors.

## 2024-06-20 ENCOUNTER — OFFICE VISIT (OUTPATIENT)
Dept: HEMATOLOGY/ONCOLOGY | Facility: CLINIC | Age: 74
End: 2024-06-20
Payer: MEDICARE

## 2024-06-20 VITALS
TEMPERATURE: 98 F | RESPIRATION RATE: 18 BRPM | DIASTOLIC BLOOD PRESSURE: 76 MMHG | HEIGHT: 60 IN | SYSTOLIC BLOOD PRESSURE: 147 MMHG | OXYGEN SATURATION: 100 % | WEIGHT: 173.5 LBS | HEART RATE: 72 BPM | BODY MASS INDEX: 34.06 KG/M2

## 2024-06-20 DIAGNOSIS — C83.00 LYMPHOMA, SMALL LYMPHOCYTIC: Primary | ICD-10-CM

## 2024-06-20 PROCEDURE — 99214 OFFICE O/P EST MOD 30 MIN: CPT | Mod: ,,,

## 2024-06-20 NOTE — PROGRESS NOTES
Subjective:       Patient ID: Amita Trejo is a 73 y.o. female.    Chief Complaint: No chief complaint on file.      History of Present Illness  Chief complaint: SLL Lugano II    HPI: 70 y/o F w/ PMHx of ELI on CPAP, mitral valve prolapse, HTN, ADHD, neuropathy, DJD referred to [facility] for newly diagnosed stage II SLL    She had routine screening MMG 2/2021 that showed some mildly enlarged b/l axillary nodes. She had b/l axillary US with Dr Lugo 3/2021 most suggestive of inflammation. Repeat US of right axilla obtained 6/2021 that showed some borderline enlarged nodes with benign sonographic appearance. She had right axillary LN biopsy showing SLL/CLL    Colonoscopy Dr Kwan 2016, negative per pt      Interval history      Today, 06/20/2024, patient denies any acute concerns today.   She is doing ok and ambulating well with a cane since hip surgery from 1/2024. She denies any fevers, chills, drenching night sweats, decreased appetite or weight loss.    Past Medical History:   Diagnosis Date    Acid reflux     Arthritis     chronic lymphocytic leukemia     Hypertension     Primary osteoarthritis of left hip 01/04/2024    Renal cyst     Sleep apnea     non compliant with cpap       Past Surgical History:   Procedure Laterality Date    BREAST SURGERY Left 1991?    lumpectomy, NO Restrictions    CARPAL TUNNEL RELEASE      CYST REMOVAL      EPIDURAL STEROID INJECTION      HIP REPLACEMENT ARTHROPLASTY Left 01/04/2024    JOINT REPLACEMENT  07/21    Left knee    KNEE JOINT MANIPULATION Left 08/29/2022    Procedure: MANIPULATION, KNEE;  Surgeon: Sohan Bowens MD;  Location: [facility] OR;  Service: Orthopedics;  Laterality: Left;    REVISION OF KNEE ARTHROPLASTY Left 06/08/2022    Procedure: REVISION, ARTHROPLASTY, KNEE;  Surgeon: Sohan Bowens MD;  Location: [facility] OR;  Service: Orthopedics;  Laterality: Left;  FEMUR / PATHOLOGY / ASHLEY    ROBOTIC ARTHROPLASTY, HIP Left 01/04/2024    Procedure: ROBOTIC  ARTHROPLASTY,HIP;  Surgeon: Isidro Barone MD;  Location: [facility] OR;  Service: Orthopedics;  Laterality: Left;    ROBOTIC ARTHROPLASTY, KNEE Right 2023    Procedure: ROBOTIC ARTHROPLASTY, KNEE, TOTAL;  Surgeon: Isidro Barone MD;  Location: [facility] OR;  Service: Orthopedics;  Laterality: Right;    SHOULDER SURGERY Right     SINUS SURGERY      x 2    SPINE SURGERY      Bulging disc in lower back    THYROID SURGERY      TONSILLECTOMY         Family History   Problem Relation Name Age of Onset    Hypertension Mother Carly     COPD Mother Carly     Hearing loss Mother Carly     Hypertension Father Kevin     COPD Father Kevin     Hearing loss Father Kevin     Breast cancer Sister Jacquelyn     Cancer Sister Jacquelyn     Diabetes Maternal Grandmother Linda Huang    Diabetes Paternal Grandmother Reina        Social History     Socioeconomic History    Marital status:    Tobacco Use    Smoking status: Former     Current packs/day: 0.00     Average packs/day: 1 pack/day for 10.0 years (10.0 ttl pk-yrs)     Types: Cigarettes     Start date: 3/18/1976     Quit date: 3/18/1986     Years since quittin.2    Smokeless tobacco: Former   Substance and Sexual Activity    Alcohol use: Not Currently    Drug use: Never    Sexual activity: Yes     Partners: Male     Birth control/protection: Post-menopausal       Current Outpatient Medications   Medication Sig Dispense Refill    amLODIPine (NORVASC) 5 MG tablet Take 1 tablet (5 mg total) by mouth every evening.      aspirin (ECOTRIN) 81 MG EC tablet Take 1 tablet (81 mg total) by mouth every 12 (twelve) hours. 84 tablet 0    B-complex with vitamin C (Z-BEC OR EQUIV) tablet Take 1 tablet by mouth once daily.      cholecalciferol, vitamin D3, 125 mcg (5,000 unit) capsule Take 5,000 Units by mouth once daily.      HYDROcodone-acetaminophen (NORCO) 5-325 mg per tablet Take 1 tablet by mouth every 6 (six) hours as needed for Pain. (Patient not taking: Reported  on 4/8/2024)      lisdexamfetamine (VYVANSE) 30 MG capsule Take 30 mg by mouth every morning.      metoprolol tartrate (LOPRESSOR) 50 MG tablet Take 75 mg by mouth once daily at 6am.      multivitamin with minerals tablet Take 1 tablet by mouth once daily.      pantoprazole (PROTONIX) 40 MG tablet 40 mg once daily.      polysaccharide iron complex 200 mg iron Cap Take 1 capsule by mouth Daily.      pramipexole (MIRAPEX) 1.5 MG tablet Take 1.5 mg by mouth 2 (two) times a day.      telmisartan-hydrochlorothiazide (MICARDIS HCT) 40-12.5 mg per tablet Take 1 tablet by mouth once daily.      vitamin B complex (B COMPLEX 1 ORAL) Take 1 tablet by mouth once daily.       No current facility-administered medications for this visit.     Facility-Administered Medications Ordered in Other Visits   Medication Dose Route Frequency Provider Last Rate Last Admin    vancomycin (VANCOCIN) 1,000 mg in dextrose 5 % (D5W) 250 mL IVPB (Vial-Mate)  15 mg/kg Intravenous On Call Procedure Isidro Barone .7 mL/hr at 01/04/24 0848 1,000 mg at 01/04/24 1035       Review of patient's allergies indicates:   Allergen Reactions    Duloxetine      Other reaction(s): Bradycardia, Hypotension, Vomit    Lorazepam      Other reaction(s): Confusion  amnesia      Tramadol Other (See Comments)     nightmares         Labs:    06/14/24: CMP unremarkable, , wbc 9.39, hgb 11.7, plt 174, ANC 2.57, ALC 6.26  02/07/2024 creatinine 0.92, albumin 4.0, LFTs unremarkable, uric acid 6.4, , WBC count 12.5, hemoglobin 10.8, MCV 92.8, platelet count 169, ANC 2.52.    04/25/2023: CMP unremarkable, WBC count 14.9, hemoglobin 12.1, MCV 94.7, ANC 3.86.    03/08/2023:  WBC count 24.2, hemoglobin 13.3, MCV 94, platelet count 259.  7/6/22 Cr 0.88, Alb 3.8, TP 7.1, , Uric acid 5.5, WBC 10.61, Hgb 10.5, , ANC 2.90, ALC 6.90  3/3/22 WBC 13.5 RBC 3.74 Hg 11.2 MCV 94 rdw 13.8  ANC 3.4 ALC 8.9 Cr 1.12 Alb 4.4 Ca 9.4   12/8/21 Cr 0.86,  Alb 3.8, TP 7.0, Ca 9.7, AlkPhos 103, , Uric acid 5.9, WBC 13.21, Hgb 11.4, , Retic 1.43, ANC 4.41, ALC 7.73  9/21/21 Cr 1.16 Alb 3.9 TP 7.4 Ca 9.9 AlkPhos 92 AST 21 ALT 12  ferritin 122 WBC 9.82 Hg 11.1  ANC 3.35 ALC 4.91  7/21/21  Ferritin 138 Hep B core ab neg Hep B s ag neg Hep B s ab neg Hep C neg Uric acid 6.7 WBC 8.78 Hg 12.2  ANC 2.78 ALC 5.14 Direct Joao neg B2 micro 4.2 SPEP w/ ANGIE IgG 930 IgA 467 IgM 43 Abs kappa 43 Abs lambda 27.8 SFLC ratio 1.55 no M spike  1/18/20 Cr 0.89 Alb 3.9 TP 7.1 WBC 8.46 Hg 12.9 MCV 90.9  ANC 3.18 ALC 4.25    Imaging:  3/29/22 MMG prominent nodes in each axilla. Some nodes have minimally increased in size.     6/14/21 US axilla unilateral: 6 right axillary nodes, 2 are borderline enlarged measuring 2.4x1.7x1.3cm and 2.3x1.3x1.1cm. Benign sonographic appearance    2/18/21 CT N/C/A/P w/ contrast: no cervical adenopathy. Small thyroid nodules. No acute pulm disease. Small focus of nodular pleural thickening in fissure of left lung stable since 2019. Increased size of several borderline enlarged axillary LNs b/l in comparison to 2019 although this finding is nonspecific. Simple renal cysts on both kidneys increased in size compared to 2018 but not suspicious for malignancy    2/5/21 screening MMG: [id number]. Small LN in upper and outer quadrant of left breast increased slightly in size from prior exams. Multiple LN within each axilla larger and more dense than on prior exam. Lymphoma should be considered    Path:  6/16/21 right axillary LN biopsy: atypical lymphoproliferative process consistent with involvement by CLL/SLL.  Flow: abnormal B cells 82% of lymphs, CD19+, CD20+ dim, CD5+, CD23+ variable, CD11c partial+, CD38-, lambda +dim  FISH: del13q/-13 detected. del17p, t(11:14), trisomy 12, del11q and del6q negative.    Review of Systems  CONSTITUTIONAL: no fevers, no chills,no weight loss, no fatigue, no weakness  HEMATOLOGIC: no  abnormal bleeding, no abnormal bruising, no drenching night sweats  ONCOLOGIC: no new masses or lumps  HEENT: no vision loss, no tinnitus or hearing loss, no nose bleeding, no dysphagia, no odynophagia  CVS: no chest pain, no palpitations, no dyspnea on exertion  RESP: no shortness of breath, no hemoptysis, no cough  BREAST: no nipple discharge, no breast tenderness, no breast masses on self breast examination  GI: no nausea, no vomiting, no diarrhea, no constipation, no melena, no hematochezia, no hematemesis, no abdominal pain, no increase in abdominal girth  : no dysuria, no hematuria, no discharge  GYN: no abnormal vaginal bleeding, no dyspareunia, no vaginal discharge  INTEGUMENT: no rashes, no abnormal bruising, no nail pitting, no hyperpigmentation  NEURO: no falls, no memory loss, no paresthesias or dysesthesias, no urofecal incontinence or retention, no loss of strength on any extremity  MSK: +chronic lower back pain, no new joint pain but chronic b/l knee pain, Right knee replacement on 8/17/2023 with right knee swelling.  PSYCH: no suicidal or homicidal ideation, no depression, no insomnia, no anhedonia  ENDOCRINE: no heat or cold intolerance, no polyuria, no polydipsia        Objective:      Physical Exam  Vitals:    06/20/24 1451   BP: (!) 147/76   Pulse: 72   Resp: 18   Temp: 97.7 °F (36.5 °C)             ECOG PS 1  GA: AAOx3, NAD  HEENT: NCAT, , EOMI, moist oral mucous membrane  LYMPH: no cervical, inguinal or supraclavicular adenopathy. Small b/l axillary LNs, largest about 2cm in right axilla, others around 1cm  CVS: s1s2 RRR, no M/R/G  RESP: CTA b/l, no crackles, no wheezes or rhonchi  ABD: soft, NT, ND, BS+, no hepatosplenomegaly  EXT: no deformities, b/l pitting pedal edema  SKIN: no rashes, no bruises or purpura, warm and dry  NEURO: normal mentation, strength, no sensory deficits    Assessment and Plan       Problem List Items Addressed This Visit          Oncology    Lymphoma, small  lymphocytic - Primary         # Lymphoma, small lymphocytic    Findings are most consistent with SLL Lugano stage II vs CLL Juarez I. She does likely have peripheral blood involvement as well  In any case, no anemia, no thrombocytopenia, no B symptoms, only 2 narciso stations involved (b/l axilla) and 13q del which confers good prognosis. No IGHV available but I do not think this is necessary at this time  Had systemic imaging with CT N/C/A/P w/ IV contrast 2/2021 without any bulky disease  No further workup needed at this time. Will observe every 12-16 weeks. If any indication arises, then she will also need PET CT and bone marrow biopsy    Patient had a robotic assisted left total hip arthroplasty done on 01/04/2024  Plan   Reviewed labs and note CBC and CMP WNL  Follow-up in 4 months with above workup to discuss further recommendations.  Patient was advised to call our clinic if she were to have constitutional symptoms    A total of  30 minutes were spent in review of records, interpretation of test, coordination of care, discussion and counseling with the patient.

## 2024-06-26 ENCOUNTER — OFFICE VISIT (OUTPATIENT)
Dept: ORTHOPEDICS | Facility: CLINIC | Age: 74
End: 2024-06-26
Payer: MEDICARE

## 2024-06-26 DIAGNOSIS — Z96.651 AFTERCARE FOLLOWING RIGHT KNEE JOINT REPLACEMENT SURGERY: ICD-10-CM

## 2024-06-26 DIAGNOSIS — M76.899 HAMSTRING TENDONITIS: Primary | ICD-10-CM

## 2024-06-26 DIAGNOSIS — Z47.1 AFTERCARE FOLLOWING RIGHT KNEE JOINT REPLACEMENT SURGERY: ICD-10-CM

## 2024-06-26 PROCEDURE — 99213 OFFICE O/P EST LOW 20 MIN: CPT | Mod: ,,, | Performed by: SPECIALIST

## 2024-06-26 NOTE — PROGRESS NOTES
Past Medical History:   Diagnosis Date    Acid reflux     Arthritis     chronic lymphocytic leukemia     Hypertension     Primary osteoarthritis of left hip 01/04/2024    Renal cyst     Sleep apnea     non compliant with cpap       Past Surgical History:   Procedure Laterality Date    BREAST SURGERY Left 1991?    lumpectomy, NO Restrictions    CARPAL TUNNEL RELEASE      CATARACT EXTRACTION W/ INTRAOCULAR LENS IMPLANT Right 06/17/2024    CYST REMOVAL      EPIDURAL STEROID INJECTION      HIP REPLACEMENT ARTHROPLASTY Left 01/04/2024    JOINT REPLACEMENT  07/21    Left knee    KNEE JOINT MANIPULATION Left 08/29/2022    Procedure: MANIPULATION, KNEE;  Surgeon: Sohan Bowens MD;  Location: [facility] OR;  Service: Orthopedics;  Laterality: Left;    REVISION OF KNEE ARTHROPLASTY Left 06/08/2022    Procedure: REVISION, ARTHROPLASTY, KNEE;  Surgeon: Sohan Bowens MD;  Location: [facility] OR;  Service: Orthopedics;  Laterality: Left;  FEMUR / PATHOLOGY / ASHLEY    ROBOTIC ARTHROPLASTY, HIP Left 01/04/2024    Procedure: ROBOTIC ARTHROPLASTY,HIP;  Surgeon: Isidro Barone MD;  Location: [facility] OR;  Service: Orthopedics;  Laterality: Left;    ROBOTIC ARTHROPLASTY, KNEE Right 08/17/2023    Procedure: ROBOTIC ARTHROPLASTY, KNEE, TOTAL;  Surgeon: Isidro Barone MD;  Location: [facility] OR;  Service: Orthopedics;  Laterality: Right;    SHOULDER SURGERY Right     SINUS SURGERY      x 2    SPINE SURGERY  05/21    Bulging disc in lower back    THYROID SURGERY      TONSILLECTOMY         Current Outpatient Medications   Medication Sig    amLODIPine (NORVASC) 5 MG tablet Take 1 tablet (5 mg total) by mouth every evening.    B-complex with vitamin C (Z-BEC OR EQUIV) tablet Take 1 tablet by mouth once daily.    cholecalciferol, vitamin D3, 125 mcg (5,000 unit) capsule Take 5,000 Units by mouth once daily.    lisdexamfetamine (VYVANSE) 30 MG capsule Take 30 mg by mouth every morning.    metoprolol tartrate (LOPRESSOR) 50 MG tablet Take 75 mg by mouth  once daily at 6am.    multivitamin with minerals tablet Take 1 tablet by mouth once daily.    pantoprazole (PROTONIX) 40 MG tablet 40 mg once daily.    polysaccharide iron complex 200 mg iron Cap Take 1 capsule by mouth Daily.    pramipexole (MIRAPEX) 1.5 MG tablet Take 1.5 mg by mouth 2 (two) times a day.    telmisartan-hydrochlorothiazide (MICARDIS HCT) 40-12.5 mg per tablet Take 1 tablet by mouth once daily.    vitamin B complex (B COMPLEX 1 ORAL) Take 1 tablet by mouth once daily.    vitamin E 100 UNIT capsule Take 100 Units by mouth once daily.    aspirin (ECOTRIN) 81 MG EC tablet Take 1 tablet (81 mg total) by mouth every 12 (twelve) hours.    HYDROcodone-acetaminophen (NORCO) 5-325 mg per tablet Take 1 tablet by mouth every 6 (six) hours as needed for Pain. (Patient not taking: Reported on 4/8/2024)     No current facility-administered medications for this visit.     Facility-Administered Medications Ordered in Other Visits   Medication    vancomycin (VANCOCIN) 1,000 mg in dextrose 5 % (D5W) 250 mL IVPB (Vial-Mate)       Review of patient's allergies indicates:   Allergen Reactions    Duloxetine      Other reaction(s): Bradycardia, Hypotension, Vomit    Lorazepam      Other reaction(s): Confusion  amnesia      Tramadol Other (See Comments)     nightmares       Family History   Problem Relation Name Age of Onset    Hypertension Mother Carly     COPD Mother Carly     Hearing loss Mother Carly     Hypertension Father Kevin     COPD Father Kevin     Hearing loss Father Kevin     Breast cancer Sister Jacquelyn     Cancer Sister Jacquelyn     Diabetes Maternal Grandmother Linda Huang    Diabetes Paternal Grandmother Reina        Social History     Socioeconomic History    Marital status:    Tobacco Use    Smoking status: Former     Current packs/day: 0.00     Average packs/day: 1 pack/day for 10.0 years (10.0 ttl pk-yrs)     Types: Cigarettes     Start date: 3/18/1976     Quit date: 3/18/1986      Years since quittin.3    Smokeless tobacco: Former   Substance and Sexual Activity    Alcohol use: Not Currently    Drug use: Never    Sexual activity: Yes     Partners: Male     Birth control/protection: Post-menopausal       Chief Complaint:   Chief Complaint   Patient presents with    Follow-up     Follow up L MARRY SAUD SX 24-4/3/24. States that the hip is fine and has no complaints.     Right knee follow up. States that she started physical therapy but had to stop due to eye surgery. Ambulates with cane. Complaints of pain the back part of right knee.          Consulting Physician: No ref. provider found    History of present illness:    This is a 73 y.o. year old female who complains of mild lateral knee pain due to hamstring tendinitis.  She has not been able to go to physical therapy and recently had eye surgery.  She was about to have her other I operated on and we will pick physical therapy up again afterwards.    Review of Systems:    Constitution:   Denies chills, fever, and sweats.  HENT:   Denies headaches or blurry vision.  Cardiovascular:  Denies chest pain or irregular heart beat.  Respiratory:   Denies cough or shortness of breath.  Gastrointestinal:  Denies abdominal pain, nausea, or vomiting.  Musculoskeletal:   Denies muscle cramps.  Neurological:   Denies dizziness or focal weakness.  Psychiatric/Behavior: Normal mental status.  Hematology/Lymph:  Denies bleeding problem or easy bruising/bleeding.  Skin:    Denies rash or suspicious lesions.    Examination:    Vital Signs:    Vitals:    24 1249   PainSc:   4       There is no height or weight on file to calculate BMI.    Constitution:   Well-developed, well nourished patient in no acute distress.  Neurological:   Alert and oriented x 3 and cooperative to examination.     Psychiatric/Behavior: Normal mental status.  Respiratory:   No shortness of breath.non labored breathing.  Cardiovascular: Regular rate and rhythm  Eyes:     Extraoccular muscles intact  Skin:    No scars, rash or suspicious lesions.    Physical Exam:  Right Knee     No swelling, no erythema, no increase heat    Mild tenderness over the hamstrings laterally    Well healed scar    Symmetrically balanced collateral ligaments throughout ROM    No instability of the knee in mid or deep flexion     Active flexion 115 degrees     Active extension 0 degrees     No weakness observed.    Normal gait    Intact sensory and motor function    Palpable pedal pulses          Assessment: Hamstring tendonitis    Aftercare following right knee joint replacement surgery        Plan:  Follow up in 3 months for checkup   Resume physical therapy after completion of her eye surgery      DISCLAIMER: This note may have been dictated using voice recognition software and may contain grammatical errors.     NOTE: Consult report sent to referring provider via [employer].     Regular rate and rhythm, Heart sounds S1 S2 present, no murmurs, rubs or gallops

## 2024-10-02 ENCOUNTER — OFFICE VISIT (OUTPATIENT)
Dept: ORTHOPEDICS | Facility: CLINIC | Age: 74
End: 2024-10-02
Payer: MEDICARE

## 2024-10-02 VITALS
SYSTOLIC BLOOD PRESSURE: 151 MMHG | HEIGHT: 60 IN | BODY MASS INDEX: 34.06 KG/M2 | HEART RATE: 78 BPM | WEIGHT: 173.5 LBS | DIASTOLIC BLOOD PRESSURE: 78 MMHG

## 2024-10-02 DIAGNOSIS — M16.11 PRIMARY LOCALIZED OSTEOARTHRITIS OF RIGHT HIP: Primary | ICD-10-CM

## 2024-10-02 DIAGNOSIS — Z96.642 S/P TOTAL LEFT HIP ARTHROPLASTY: ICD-10-CM

## 2024-10-02 PROCEDURE — 99214 OFFICE O/P EST MOD 30 MIN: CPT | Mod: ,,, | Performed by: SPECIALIST

## 2024-10-02 NOTE — H&P
Chief Complaint:   Chief Complaint   Patient presents with    Follow-up     13mo sp LT MARRY sx 01/04/24 gl 04/03/24. Doing well overall. She states she took a break from therapy in order to have cataract surgery. Doing well since this and is here today for follow up. States no issues in regards to hip and she is satisfied.       History of present illness:    74-year-old female presents office today for a follow-up on her left hip.  She is now 9 months S/P left total hip arthroplasty overall doing very well.  No complaints in pain.  She is having some intermittent right groin pain.  He denies any history of injury.    Past Medical History:   Diagnosis Date    Acid reflux     Arthritis     chronic lymphocytic leukemia     Hypertension     Primary osteoarthritis of left hip 01/04/2024    Renal cyst     Sleep apnea     non compliant with cpap       Past Surgical History:   Procedure Laterality Date    BREAST SURGERY Left 1991?    lumpectomy, NO Restrictions    CARPAL TUNNEL RELEASE      CATARACT EXTRACTION W/ INTRAOCULAR LENS IMPLANT Right 06/17/2024    CYST REMOVAL      EPIDURAL STEROID INJECTION      HIP REPLACEMENT ARTHROPLASTY Left 01/04/2024    JOINT REPLACEMENT  07/21    Left knee    KNEE JOINT MANIPULATION Left 08/29/2022    Procedure: MANIPULATION, KNEE;  Surgeon: Sohan Bowens MD;  Location: [facility] OR;  Service: Orthopedics;  Laterality: Left;    REVISION OF KNEE ARTHROPLASTY Left 06/08/2022    Procedure: REVISION, ARTHROPLASTY, KNEE;  Surgeon: Sohan Bowens MD;  Location: [facility] OR;  Service: Orthopedics;  Laterality: Left;  FEMUR / PATHOLOGY / ASHLEY    ROBOTIC ARTHROPLASTY, HIP Left 01/04/2024    Procedure: ROBOTIC ARTHROPLASTY,HIP;  Surgeon: Isidro Barone MD;  Location: [facility] OR;  Service: Orthopedics;  Laterality: Left;    ROBOTIC ARTHROPLASTY, KNEE Right 08/17/2023    Procedure: ROBOTIC ARTHROPLASTY, KNEE, TOTAL;  Surgeon: Isidro Barone MD;  Location: [facility] OR;  Service: Orthopedics;  Laterality:  Right;    SHOULDER SURGERY Right     SINUS SURGERY      x 2    SPINE SURGERY  05/21    Bulging disc in lower back    THYROID SURGERY      TONSILLECTOMY         Current Outpatient Medications   Medication Sig    amLODIPine (NORVASC) 5 MG tablet Take 1 tablet (5 mg total) by mouth every evening.    B-complex with vitamin C (Z-BEC OR EQUIV) tablet Take 1 tablet by mouth once daily.    cholecalciferol, vitamin D3, 125 mcg (5,000 unit) capsule Take 5,000 Units by mouth once daily.    lisdexamfetamine (VYVANSE) 30 MG capsule Take 30 mg by mouth every morning.    metoprolol tartrate (LOPRESSOR) 50 MG tablet Take 75 mg by mouth once daily at 6am.    multivitamin with minerals tablet Take 1 tablet by mouth once daily.    pantoprazole (PROTONIX) 40 MG tablet 40 mg once daily.    polysaccharide iron complex 200 mg iron Cap Take 1 capsule by mouth Daily.    pramipexole (MIRAPEX) 1.5 MG tablet Take 1.5 mg by mouth 2 (two) times a day.    telmisartan-hydrochlorothiazide (MICARDIS HCT) 40-12.5 mg per tablet Take 1 tablet by mouth once daily.    vitamin B complex (B COMPLEX 1 ORAL) Take 1 tablet by mouth once daily.    vitamin E 100 UNIT capsule Take 100 Units by mouth once daily.    aspirin (ECOTRIN) 81 MG EC tablet Take 1 tablet (81 mg total) by mouth every 12 (twelve) hours.    HYDROcodone-acetaminophen (NORCO) 5-325 mg per tablet Take 1 tablet by mouth every 6 (six) hours as needed for Pain. (Patient not taking: Reported on 4/8/2024)     No current facility-administered medications for this visit.     Facility-Administered Medications Ordered in Other Visits   Medication    vancomycin (VANCOCIN) 1,000 mg in dextrose 5 % (D5W) 250 mL IVPB (Vial-Mate)       Review of patient's allergies indicates:   Allergen Reactions    Duloxetine      Other reaction(s): Bradycardia, Hypotension, Vomit    Lorazepam      Other reaction(s): Confusion  amnesia      Tramadol Other (See Comments)     nightmares       Family History   Problem Relation  Name Age of Onset    Hypertension Mother Carly     COPD Mother Craly     Hearing loss Mother Carly     Hypertension Father Kevin     COPD Father Kevin     Hearing loss Father Kevin     Breast cancer Sister Jacquelyn     Cancer Sister Jacquelyn     Diabetes Maternal Grandmother Linda Huang    Diabetes Paternal Grandmother Reina        Social History     Socioeconomic History    Marital status:    Tobacco Use    Smoking status: Former     Current packs/day: 0.00     Average packs/day: 1 pack/day for 10.0 years (10.0 ttl pk-yrs)     Types: Cigarettes     Start date: 3/18/1976     Quit date: 3/18/1986     Years since quittin.5    Smokeless tobacco: Former   Substance and Sexual Activity    Alcohol use: Not Currently    Drug use: Never    Sexual activity: Yes     Partners: Male     Birth control/protection: Post-menopausal         Review of Systems:    Constitution:   Denies chills, fever, and sweats.  HENT:   Denies headaches or blurry vision.  Cardiovascular:  Denies chest pain or irregular heart beat.  Respiratory:   Denies cough or shortness of breath.  Gastrointestinal:  Denies abdominal pain, nausea, or vomiting.  Musculoskeletal:   Denies muscle cramps.  Neurological:   Denies dizziness or focal weakness.  Psychiatric/Behavior: Normal mental status.  Hematology/Lymph:  Denies bleeding problem or easy bruising/bleeding.  Skin:    Denies rash or suspicious lesions.    Examination:    Vital Signs:    Vitals:    10/02/24 1307   BP: (!) 151/78   Pulse: 78   Weight: 78.7 kg (173 lb 8 oz)   Height: 5' (1.524 m)       Body mass index is 33.88 kg/m².    Constitution:   Well-developed, well nourished patient in no acute distress.  Neurological:   Alert and oriented x 3 and cooperative to examination.     Psychiatric/Behavior: Normal mental status.  Respiratory:   No shortness of breath.  Nonlabored breathing  Cardiovascular:           Regular rate and rhythm  Eyes:    Extraoccular muscles  intact  Skin:    No scars, rash or suspicious lesions.    Physical Exam:     Left Hip     No swelling,erythema    Well healed scar    No tenderness or instability of the hips was noted. Motion was normal. No weakness observed.     Right hip exam     No signs of edema, erythema, or induration.    Tender over the greater trochanteric region.    Pain with internal and external rotation    Passive hip abduction 30 degrees   Passive hip flexion 90 degrees   Passive internal rotation 25 degrees   Passive external rotation 45 degrees   No weakness of the left hip was observed. An antalgic gait was observed. limping was noted     xrays    Right hip x-ray with two or more views of the AP and lateral aspects were reviewed.   Impressions   Joint space narrowing with osteophytes arising from the hip joint and subchondral cysts seen          Assessment:     Primary localized osteoarthritis of right hip  -     Place in Outpatient; Standing  -     Vital signs; Standing  -     Cleanse with Chlorhexidine (CHG); Standing  -     Place AUGUSTA hose; Standing  -     Place sequential compression device; Standing  -     POCT glucose; Standing  -     Inpatient consult to Anesthesiology; Standing  -     Case Request Operating Room: Injection, Joint, Shoulder, Hip, Or Knee    S/P total left hip arthroplasty        Plan:      Recommend right hip intra-articular corticosteroid injection under fluoroscopy with no sedation as she has moderate osteoarthritis of the right hip with worsening symptoms.  We will get this scheduled for her next couple weeks as she has a wedding to attend at the end of October.    The proposed procedure as well as associated risks/benefits were discussed at length with the patient and family with risks to include pain, bleeding, infection, future surgeries, neurovascular compromise, loss of limb, heart attack, stroke, deep vein thrombosis, and even death. They understand and agree with the treatment plan.          Follow  up in about 3 months (around 1/2/2025).    DISCLAIMER: This note may have been dictated using voice recognition software and may contain grammatical errors.

## 2024-10-02 NOTE — H&P (VIEW-ONLY)
Chief Complaint:   Chief Complaint   Patient presents with    Follow-up     13mo sp LT MARRY sx 01/04/24 gl 04/03/24. Doing well overall. She states she took a break from therapy in order to have cataract surgery. Doing well since this and is here today for follow up. States no issues in regards to hip and she is satisfied.       History of present illness:    74-year-old female presents office today for a follow-up on her left hip.  She is now 9 months S/P left total hip arthroplasty overall doing very well.  No complaints in pain.  She is having some intermittent right groin pain.  He denies any history of injury.    Past Medical History:   Diagnosis Date    Acid reflux     Arthritis     chronic lymphocytic leukemia     Hypertension     Primary osteoarthritis of left hip 01/04/2024    Renal cyst     Sleep apnea     non compliant with cpap       Past Surgical History:   Procedure Laterality Date    BREAST SURGERY Left 1991?    lumpectomy, NO Restrictions    CARPAL TUNNEL RELEASE      CATARACT EXTRACTION W/ INTRAOCULAR LENS IMPLANT Right 06/17/2024    CYST REMOVAL      EPIDURAL STEROID INJECTION      HIP REPLACEMENT ARTHROPLASTY Left 01/04/2024    JOINT REPLACEMENT  07/21    Left knee    KNEE JOINT MANIPULATION Left 08/29/2022    Procedure: MANIPULATION, KNEE;  Surgeon: Sohan Bowens MD;  Location: [facility] OR;  Service: Orthopedics;  Laterality: Left;    REVISION OF KNEE ARTHROPLASTY Left 06/08/2022    Procedure: REVISION, ARTHROPLASTY, KNEE;  Surgeon: Sohan Bowens MD;  Location: [facility] OR;  Service: Orthopedics;  Laterality: Left;  FEMUR / PATHOLOGY / ASHLEY    ROBOTIC ARTHROPLASTY, HIP Left 01/04/2024    Procedure: ROBOTIC ARTHROPLASTY,HIP;  Surgeon: Isidro Barone MD;  Location: [facility] OR;  Service: Orthopedics;  Laterality: Left;    ROBOTIC ARTHROPLASTY, KNEE Right 08/17/2023    Procedure: ROBOTIC ARTHROPLASTY, KNEE, TOTAL;  Surgeon: Isdiro Barone MD;  Location: [facility] OR;  Service: Orthopedics;  Laterality:  Right;    SHOULDER SURGERY Right     SINUS SURGERY      x 2    SPINE SURGERY  05/21    Bulging disc in lower back    THYROID SURGERY      TONSILLECTOMY         Current Outpatient Medications   Medication Sig    amLODIPine (NORVASC) 5 MG tablet Take 1 tablet (5 mg total) by mouth every evening.    B-complex with vitamin C (Z-BEC OR EQUIV) tablet Take 1 tablet by mouth once daily.    cholecalciferol, vitamin D3, 125 mcg (5,000 unit) capsule Take 5,000 Units by mouth once daily.    lisdexamfetamine (VYVANSE) 30 MG capsule Take 30 mg by mouth every morning.    metoprolol tartrate (LOPRESSOR) 50 MG tablet Take 75 mg by mouth once daily at 6am.    multivitamin with minerals tablet Take 1 tablet by mouth once daily.    pantoprazole (PROTONIX) 40 MG tablet 40 mg once daily.    polysaccharide iron complex 200 mg iron Cap Take 1 capsule by mouth Daily.    pramipexole (MIRAPEX) 1.5 MG tablet Take 1.5 mg by mouth 2 (two) times a day.    telmisartan-hydrochlorothiazide (MICARDIS HCT) 40-12.5 mg per tablet Take 1 tablet by mouth once daily.    vitamin B complex (B COMPLEX 1 ORAL) Take 1 tablet by mouth once daily.    vitamin E 100 UNIT capsule Take 100 Units by mouth once daily.    aspirin (ECOTRIN) 81 MG EC tablet Take 1 tablet (81 mg total) by mouth every 12 (twelve) hours.    HYDROcodone-acetaminophen (NORCO) 5-325 mg per tablet Take 1 tablet by mouth every 6 (six) hours as needed for Pain. (Patient not taking: Reported on 4/8/2024)     No current facility-administered medications for this visit.     Facility-Administered Medications Ordered in Other Visits   Medication    vancomycin (VANCOCIN) 1,000 mg in dextrose 5 % (D5W) 250 mL IVPB (Vial-Mate)       Review of patient's allergies indicates:   Allergen Reactions    Duloxetine      Other reaction(s): Bradycardia, Hypotension, Vomit    Lorazepam      Other reaction(s): Confusion  amnesia      Tramadol Other (See Comments)     nightmares       Family History   Problem Relation  Name Age of Onset    Hypertension Mother Carly     COPD Mother Carly     Hearing loss Mother Carly     Hypertension Father Kevin     COPD Father Kevin     Hearing loss Father Kevin     Breast cancer Sister Jacquelyn     Cancer Sister Jacquelyn     Diabetes Maternal Grandmother Linda Huang    Diabetes Paternal Grandmother Reina        Social History     Socioeconomic History    Marital status:    Tobacco Use    Smoking status: Former     Current packs/day: 0.00     Average packs/day: 1 pack/day for 10.0 years (10.0 ttl pk-yrs)     Types: Cigarettes     Start date: 3/18/1976     Quit date: 3/18/1986     Years since quittin.5    Smokeless tobacco: Former   Substance and Sexual Activity    Alcohol use: Not Currently    Drug use: Never    Sexual activity: Yes     Partners: Male     Birth control/protection: Post-menopausal         Review of Systems:    Constitution:   Denies chills, fever, and sweats.  HENT:   Denies headaches or blurry vision.  Cardiovascular:  Denies chest pain or irregular heart beat.  Respiratory:   Denies cough or shortness of breath.  Gastrointestinal:  Denies abdominal pain, nausea, or vomiting.  Musculoskeletal:   Denies muscle cramps.  Neurological:   Denies dizziness or focal weakness.  Psychiatric/Behavior: Normal mental status.  Hematology/Lymph:  Denies bleeding problem or easy bruising/bleeding.  Skin:    Denies rash or suspicious lesions.    Examination:    Vital Signs:    Vitals:    10/02/24 1307   BP: (!) 151/78   Pulse: 78   Weight: 78.7 kg (173 lb 8 oz)   Height: 5' (1.524 m)       Body mass index is 33.88 kg/m².    Constitution:   Well-developed, well nourished patient in no acute distress.  Neurological:   Alert and oriented x 3 and cooperative to examination.     Psychiatric/Behavior: Normal mental status.  Respiratory:   No shortness of breath.  Nonlabored breathing  Cardiovascular:           Regular rate and rhythm  Eyes:    Extraoccular muscles  intact  Skin:    No scars, rash or suspicious lesions.    Physical Exam:     Left Hip     No swelling,erythema    Well healed scar    No tenderness or instability of the hips was noted. Motion was normal. No weakness observed.     Right hip exam     No signs of edema, erythema, or induration.    Tender over the greater trochanteric region.    Pain with internal and external rotation    Passive hip abduction 30 degrees   Passive hip flexion 90 degrees   Passive internal rotation 25 degrees   Passive external rotation 45 degrees   No weakness of the left hip was observed. An antalgic gait was observed. limping was noted     xrays    Right hip x-ray with two or more views of the AP and lateral aspects were reviewed.   Impressions   Joint space narrowing with osteophytes arising from the hip joint and subchondral cysts seen          Assessment:     Primary localized osteoarthritis of right hip  -     Place in Outpatient; Standing  -     Vital signs; Standing  -     Cleanse with Chlorhexidine (CHG); Standing  -     Place AUGUSTA hose; Standing  -     Place sequential compression device; Standing  -     POCT glucose; Standing  -     Inpatient consult to Anesthesiology; Standing  -     Case Request Operating Room: Injection, Joint, Shoulder, Hip, Or Knee    S/P total left hip arthroplasty        Plan:      Recommend right hip intra-articular corticosteroid injection under fluoroscopy with no sedation as she has moderate osteoarthritis of the right hip with worsening symptoms.  We will get this scheduled for her next couple weeks as she has a wedding to attend at the end of October.    The proposed procedure as well as associated risks/benefits were discussed at length with the patient and family with risks to include pain, bleeding, infection, future surgeries, neurovascular compromise, loss of limb, heart attack, stroke, deep vein thrombosis, and even death. They understand and agree with the treatment plan.          Follow  up in about 3 months (around 1/2/2025).    DISCLAIMER: This note may have been dictated using voice recognition software and may contain grammatical errors.

## 2024-10-07 RX ORDER — BUTALB/ACETAMINOPHEN/CAFFEINE 50-325-40
2 TABLET ORAL DAILY
COMMUNITY

## 2024-10-22 ENCOUNTER — HOSPITAL ENCOUNTER (OUTPATIENT)
Facility: HOSPITAL | Age: 74
Discharge: HOME | End: 2024-10-22
Attending: SPECIALIST | Admitting: SPECIALIST
Payer: MEDICARE

## 2024-10-22 DIAGNOSIS — M16.11 PRIMARY LOCALIZED OSTEOARTHRITIS OF RIGHT HIP: ICD-10-CM

## 2024-10-22 DIAGNOSIS — Z96.642 S/P TOTAL LEFT HIP ARTHROPLASTY: ICD-10-CM

## 2024-10-22 PROCEDURE — 77002 NEEDLE LOCALIZATION BY XRAY: CPT | Mod: 26,,, | Performed by: SPECIALIST

## 2024-10-22 PROCEDURE — 20610 DRAIN/INJ JOINT/BURSA W/O US: CPT | Performed by: SPECIALIST

## 2024-10-22 PROCEDURE — 63600175 PHARM REV CODE 636 W HCPCS: Performed by: SPECIALIST

## 2024-10-22 PROCEDURE — 20610 DRAIN/INJ JOINT/BURSA W/O US: CPT | Mod: RT,,, | Performed by: SPECIALIST

## 2024-10-22 RX ORDER — BETAMETHASONE SODIUM PHOSPHATE AND BETAMETHASONE ACETATE 3; 3 MG/ML; MG/ML
INJECTION, SUSPENSION INTRA-ARTICULAR; INTRALESIONAL; INTRAMUSCULAR; SOFT TISSUE
Status: DISCONTINUED
Start: 2024-10-22 | End: 2024-10-22 | Stop reason: HOSPADM

## 2024-10-22 RX ORDER — BETAMETHASONE SODIUM PHOSPHATE AND BETAMETHASONE ACETATE 3; 3 MG/ML; MG/ML
INJECTION, SUSPENSION INTRA-ARTICULAR; INTRALESIONAL; INTRAMUSCULAR; SOFT TISSUE
Status: DISCONTINUED | OUTPATIENT
Start: 2024-10-22 | End: 2024-10-22 | Stop reason: HOSPADM

## 2024-10-22 RX ORDER — LIDOCAINE HYDROCHLORIDE 10 MG/ML
INJECTION, SOLUTION INFILTRATION; PERINEURAL
Status: DISCONTINUED
Start: 2024-10-22 | End: 2024-10-22 | Stop reason: HOSPADM

## 2024-10-22 RX ORDER — LIDOCAINE HYDROCHLORIDE 10 MG/ML
INJECTION, SOLUTION EPIDURAL; INFILTRATION; INTRACAUDAL; PERINEURAL
Status: DISCONTINUED | OUTPATIENT
Start: 2024-10-22 | End: 2024-10-22 | Stop reason: HOSPADM

## 2024-10-22 RX ORDER — MUPIROCIN 20 MG/G
OINTMENT TOPICAL 2 TIMES DAILY
Status: CANCELLED | OUTPATIENT
Start: 2024-10-22 | End: 2024-10-24

## 2024-10-22 NOTE — OP NOTE
[facility] Orthopa[facility] - Periop Services  General Surgery  Operative Note    SUMMARY     Date of Procedure: 10/22/2024     Procedure: Procedure(s) (LRB):  Injection, Joint, Shoulder, Hip, Or Knee (Right)     Right hip fluoroscopic guided intra-articular corticosteroid injection with no anesthesia  Surgeons and Role:     * Isidro Barone MD - Primary    Assisting Surgeon: None     Pre-Operative Diagnosis: Primary localized osteoarthritis of right hip [M16.11]    Post-Operative Diagnosis: Post-Op Diagnosis Codes:     * Primary localized osteoarthritis of right hip [M16.11]    Anesthesia: Local    Operative Findings (including complications, if any):  Osteoarthritis right hip    Description of Technical Procedures:  The patient is a 74-year-old female with osteoarthritis of the right hip who elected undergo a right hip fluoroscopic guided intra-articular corticosteroid injection with no anesthesia.  Risks, benefits, alternatives, and complications of operative and nonoperative treatment were explained.  She understood, agreed, and wanted to proceed with the operation.  Valid informed consent was obtained.  She was brought to the operating room placed supine on operating room table and underwent positioning.  She was secured in the usual sterile ChloraPrep scrub and paint followed by sterile draping was performed.  Utilizing fluoroscopy the anterior injection site over the right hip was identified and anesthetized using lidocaine.  I then advanced an 18 gauge spinal needle under fluoroscopic guidance into the right hip joint.  Position was confirmed radiographically and then corticosteroid and lidocaine were injected into the right hip joint.  Patient tolerated the procedure well.  A Band-Aid was applied and she will be discharged home weight-bearing as tolerated with activities as tolerated and follow up p.r.n.      Estimated Blood Loss (EBL): * No values recorded between 10/22/2024  6:19 AM and 10/22/2024   6:25 AM *           Implants: * No implants in log *    Specimens:   Specimen (24h ago, onward)      None                    Condition: Good    Disposition: PACU - hemodynamically stable.

## 2024-10-24 VITALS
BODY MASS INDEX: 35.02 KG/M2 | SYSTOLIC BLOOD PRESSURE: 144 MMHG | HEART RATE: 64 BPM | OXYGEN SATURATION: 99 % | DIASTOLIC BLOOD PRESSURE: 71 MMHG | RESPIRATION RATE: 19 BRPM | WEIGHT: 178.38 LBS | HEIGHT: 60 IN | TEMPERATURE: 98 F

## 2024-10-25 NOTE — DISCHARGE SUMMARY
[facility]  Discharge Note  Short Stay    Admit Date: 10/22/2024    Discharge Date and Time: 10/22/2024  7:28 AM     Attending Physician: No att. providers found     Discharge Provider: Josh Barone    Diagnoses:  There are no hospital problems to display for this patient.      Discharged Condition: Stable    Hospital Course: The patient was admitted for elective fluoroscopic guided intra-articular corticosteroid injection right hip. Patient tolerated the procedure well with no immediate postoperative complications.      Final Diagnoses: Same as principal problem.    Disposition:  Home    Follow up/Patient Instructions:    Medications:  Reconciled Home Medications:      Medication List        CONTINUE taking these medications      B-complex with vitamin C tablet  Commonly known as: Z-Bec or Equiv  Take 1 tablet by mouth once daily.     calcium citrate-vitamin D3 315-200 mg 315 mg-5 mcg (200 unit) per tablet  Commonly known as: CITRACAL+D  Take 2 tablets by mouth Daily.     cholecalciferol (vitamin D3) 125 mcg (5,000 unit) capsule  Take 5,000 Units by mouth once daily.     lisdexamfetamine 30 MG capsule  Commonly known as: VYVANSE  Take 30 mg by mouth every morning.     metoprolol tartrate 50 MG tablet  Commonly known as: LOPRESSOR  Take 50 mg by mouth once daily at 6am.     multivitamin with minerals tablet  Take 1 tablet by mouth once daily.     pantoprazole 40 MG tablet  Commonly known as: PROTONIX  40 mg once daily.     polysaccharide iron complex 200 mg iron Cap  Take 1 capsule by mouth Daily.     pramipexole 1.5 MG tablet  Commonly known as: MIRAPEX  Take 1.5 mg by mouth 2 (two) times a day.     telmisartan-hydrochlorothiazide 40-12.5 mg per tablet  Commonly known as: MICARDIS HCT  Take 1 tablet by mouth once daily.     vitamin E 100 UNIT capsule  Take 100 Units by mouth once daily.            ASK your doctor about these medications      aspirin 81 MG EC tablet  Commonly known as:  ECOTRIN  Take 1 tablet (81 mg total) by mouth every 12 (twelve) hours.     B COMPLEX 1 ORAL  Take 1 tablet by mouth once daily.     HYDROcodone-acetaminophen 5-325 mg per tablet  Commonly known as: NORCO  Take 1 tablet by mouth every 6 (six) hours as needed for Pain.            Discharge Procedure Orders   Diet general   Order Comments: As prior to surgery     Ice to affected area     Activity as tolerated         Discharge Procedure Orders (must include Diet, Follow-up, Activity):   Discharge Procedure Orders (must include Diet, Follow-up, Activity)   Diet general   Order Comments: As prior to surgery     Ice to affected area     Activity as tolerated

## 2024-11-11 ENCOUNTER — OFFICE VISIT (OUTPATIENT)
Dept: HEMATOLOGY/ONCOLOGY | Facility: CLINIC | Age: 74
End: 2024-11-11
Payer: MEDICARE

## 2024-11-11 VITALS
RESPIRATION RATE: 14 BRPM | WEIGHT: 179.63 LBS | HEART RATE: 82 BPM | OXYGEN SATURATION: 99 % | HEIGHT: 60 IN | DIASTOLIC BLOOD PRESSURE: 82 MMHG | BODY MASS INDEX: 35.27 KG/M2 | SYSTOLIC BLOOD PRESSURE: 147 MMHG

## 2024-11-11 DIAGNOSIS — C83.00 LYMPHOMA, SMALL LYMPHOCYTIC: Primary | ICD-10-CM

## 2024-11-11 PROCEDURE — 99214 OFFICE O/P EST MOD 30 MIN: CPT | Mod: ,,,

## 2024-11-11 NOTE — PROGRESS NOTES
Subjective:       Patient ID: Amita Trejo is a 74 y.o. female.    Chief Complaint: Follow-up (Patient reports no new concerns today. )      History of Present Illness  Chief complaint: SLL Lugano II    HPI: 72 y/o F w/ PMHx of ELI on CPAP, mitral valve prolapse, HTN, ADHD, neuropathy, DJD referred to [facility] for newly diagnosed stage II SLL    She had routine screening MMG 2/2021 that showed some mildly enlarged b/l axillary nodes. She had b/l axillary US with Dr Lugo 3/2021 most suggestive of inflammation. Repeat US of right axilla obtained 6/2021 that showed some borderline enlarged nodes with benign sonographic appearance. She had right axillary LN biopsy showing SLL/CLL    Colonoscopy Dr Kwan 2016, negative per pt      Interval history      Today, 11/11/2024, patient denies any acute concerns today. Patient continues doing well. She denies any fevers, chills, drenching night sweats, decreased appetite or weight loss.    Past Medical History:   Diagnosis Date    Acid reflux     Arthritis     chronic lymphocytic leukemia     Hypertension     Primary osteoarthritis of left hip 01/04/2024    Renal cyst     Sleep apnea     non compliant with cpap       Past Surgical History:   Procedure Laterality Date    BREAST SURGERY Left 1991?    lumpectomy, NO Restrictions    CARPAL TUNNEL RELEASE      CATARACT EXTRACTION W/ INTRAOCULAR LENS IMPLANT Bilateral     CATARACT EXTRACTION, BILATERAL      CYST REMOVAL      EPIDURAL STEROID INJECTION      EXCISION OF THYROGLOSSAL DUCT CYST      INJECTION OF JOINT Right 10/22/2024    Procedure: Injection, Joint, Shoulder, Hip, Or Knee;  Surgeon: Isidro Barone MD;  Location: [facility] OR;  Service: Orthopedics;  Laterality: Right;  Right hip intra-articular corticosteroid injection under fluoroscopy   No sedation    JOINT REPLACEMENT  07/21    Left knee    KNEE JOINT MANIPULATION Left 08/29/2022    Procedure: MANIPULATION, KNEE;  Surgeon: Sohan Bowens MD;  Location: [facility] OR;   Service: Orthopedics;  Laterality: Left;    REVISION OF KNEE ARTHROPLASTY Left 2022    Procedure: REVISION, ARTHROPLASTY, KNEE;  Surgeon: Sohan Bowens MD;  Location: [facility] OR;  Service: Orthopedics;  Laterality: Left;  FEMUR / PATHOLOGY / ASHLEY    ROBOTIC ARTHROPLASTY, HIP Left 2024    Procedure: ROBOTIC ARTHROPLASTY,HIP;  Surgeon: Isidro Barone MD;  Location: [facility] OR;  Service: Orthopedics;  Laterality: Left;    ROBOTIC ARTHROPLASTY, KNEE Right 2023    Procedure: ROBOTIC ARTHROPLASTY, KNEE, TOTAL;  Surgeon: Isidro Barone MD;  Location: [facility] OR;  Service: Orthopedics;  Laterality: Right;    SHOULDER SURGERY Right     SINUS SURGERY      x 2    SPINE SURGERY      Bulging disc in lower back    TONSILLECTOMY         Family History   Problem Relation Name Age of Onset    Hypertension Mother Carly     COPD Mother Carly     Hearing loss Mother Carly     Hypertension Father Kevin     COPD Father Kevin     Hearing loss Father Kevin     Breast cancer Sister Jacquelyn     Cancer Sister Jacquelyn     Diabetes Maternal Grandmother Linda Huang    Diabetes Paternal Grandmother Reina        Social History     Socioeconomic History    Marital status:    Tobacco Use    Smoking status: Former     Current packs/day: 0.00     Average packs/day: 1 pack/day for 10.0 years (10.0 ttl pk-yrs)     Types: Cigarettes     Start date: 3/18/1976     Quit date: 3/18/1986     Years since quittin.6    Smokeless tobacco: Never   Substance and Sexual Activity    Alcohol use: Not Currently    Drug use: Never    Sexual activity: Yes     Partners: Male     Birth control/protection: Post-menopausal       Current Outpatient Medications   Medication Sig Dispense Refill    B-complex with vitamin C (Z-BEC OR EQUIV) tablet Take 1 tablet by mouth once daily.      cholecalciferol, vitamin D3, 125 mcg (5,000 unit) capsule Take 5,000 Units by mouth once daily.      lisdexamfetamine (VYVANSE) 30 MG capsule Take  30 mg by mouth every morning.      metoprolol tartrate (LOPRESSOR) 50 MG tablet Take 50 mg by mouth once daily at 6am.      multivitamin with minerals tablet Take 1 tablet by mouth once daily.      pantoprazole (PROTONIX) 40 MG tablet 40 mg once daily.      polysaccharide iron complex 200 mg iron Cap Take 1 capsule by mouth Daily.      pramipexole (MIRAPEX) 1.5 MG tablet Take 1.5 mg by mouth 2 (two) times a day.      telmisartan-hydrochlorothiazide (MICARDIS HCT) 40-12.5 mg per tablet Take 1 tablet by mouth once daily.      vitamin B complex (B COMPLEX 1 ORAL) Take 1 tablet by mouth once daily.      vitamin E 100 UNIT capsule Take 100 Units by mouth once daily.      aspirin (ECOTRIN) 81 MG EC tablet Take 1 tablet (81 mg total) by mouth every 12 (twelve) hours. 84 tablet 0    calcium citrate-vitamin D3 315-200 mg (CITRACAL+D) 315 mg-5 mcg (200 unit) per tablet Take 2 tablets by mouth Daily.      HYDROcodone-acetaminophen (NORCO) 5-325 mg per tablet Take 1 tablet by mouth every 6 (six) hours as needed for Pain. (Patient not taking: Reported on 4/8/2024)       No current facility-administered medications for this visit.     Facility-Administered Medications Ordered in Other Visits   Medication Dose Route Frequency Provider Last Rate Last Admin    vancomycin (VANCOCIN) 1,000 mg in dextrose 5 % (D5W) 250 mL IVPB (Vial-Mate)  15 mg/kg Intravenous On Call Procedure Isidro Barone .7 mL/hr at 01/04/24 0848 1,000 mg at 01/04/24 1035       Review of patient's allergies indicates:   Allergen Reactions    Duloxetine      Other reaction(s): Bradycardia, Hypotension, Vomit    Lorazepam      Other reaction(s): Confusion  amnesia      Tramadol Other (See Comments)     nightmares         Labs:    11/10/24: CMP unremarkable, wbc 14.32, hgb 12.6, plt 171, ANC 4.79, ALC 8.72  06/14/24: CMP unremarkable, , wbc 9.39, hgb 11.7, plt 174, ANC 2.57, ALC 6.26  02/07/2024 creatinine 0.92, albumin 4.0, LFTs unremarkable, uric acid  6.4, , WBC count 12.5, hemoglobin 10.8, MCV 92.8, platelet count 169, ANC 2.52.    04/25/2023: CMP unremarkable, WBC count 14.9, hemoglobin 12.1, MCV 94.7, ANC 3.86.    03/08/2023:  WBC count 24.2, hemoglobin 13.3, MCV 94, platelet count 259.  7/6/22 Cr 0.88, Alb 3.8, TP 7.1, , Uric acid 5.5, WBC 10.61, Hgb 10.5, , ANC 2.90, ALC 6.90  3/3/22 WBC 13.5 RBC 3.74 Hg 11.2 MCV 94 rdw 13.8  ANC 3.4 ALC 8.9 Cr 1.12 Alb 4.4 Ca 9.4   12/8/21 Cr 0.86, Alb 3.8, TP 7.0, Ca 9.7, AlkPhos 103, , Uric acid 5.9, WBC 13.21, Hgb 11.4, , Retic 1.43, ANC 4.41, ALC 7.73  9/21/21 Cr 1.16 Alb 3.9 TP 7.4 Ca 9.9 AlkPhos 92 AST 21 ALT 12  ferritin 122 WBC 9.82 Hg 11.1  ANC 3.35 ALC 4.91  7/21/21  Ferritin 138 Hep B core ab neg Hep B s ag neg Hep B s ab neg Hep C neg Uric acid 6.7 WBC 8.78 Hg 12.2  ANC 2.78 ALC 5.14 Direct Joao neg B2 micro 4.2 SPEP w/ ANGIE IgG 930 IgA 467 IgM 43 Abs kappa 43 Abs lambda 27.8 SFLC ratio 1.55 no M spike  1/18/20 Cr 0.89 Alb 3.9 TP 7.1 WBC 8.46 Hg 12.9 MCV 90.9  ANC 3.18 ALC 4.25    Imaging:  3/29/22 MMG prominent nodes in each axilla. Some nodes have minimally increased in size.     6/14/21 US axilla unilateral: 6 right axillary nodes, 2 are borderline enlarged measuring 2.4x1.7x1.3cm and 2.3x1.3x1.1cm. Benign sonographic appearance    2/18/21 CT N/C/A/P w/ contrast: no cervical adenopathy. Small thyroid nodules. No acute pulm disease. Small focus of nodular pleural thickening in fissure of left lung stable since 2019. Increased size of several borderline enlarged axillary LNs b/l in comparison to 2019 although this finding is nonspecific. Simple renal cysts on both kidneys increased in size compared to 2018 but not suspicious for malignancy    2/5/21 screening MMG: [id number]. Small LN in upper and outer quadrant of left breast increased slightly in size from prior exams. Multiple LN within each axilla larger and more dense than  on prior exam. Lymphoma should be considered    Path:  6/16/21 right axillary LN biopsy: atypical lymphoproliferative process consistent with involvement by CLL/SLL.  Flow: abnormal B cells 82% of lymphs, CD19+, CD20+ dim, CD5+, CD23+ variable, CD11c partial+, CD38-, lambda +dim  FISH: del13q/-13 detected. del17p, t(11:14), trisomy 12, del11q and del6q negative.    Review of Systems  CONSTITUTIONAL: no fevers, no chills,no weight loss, no fatigue, no weakness  HEMATOLOGIC: no abnormal bleeding, no abnormal bruising, no drenching night sweats  ONCOLOGIC: no new masses or lumps  HEENT: no vision loss, no tinnitus or hearing loss, no nose bleeding, no dysphagia, no odynophagia  CVS: no chest pain, no palpitations, no dyspnea on exertion  RESP: no shortness of breath, no hemoptysis, no cough  BREAST: no nipple discharge, no breast tenderness, no breast masses on self breast examination  GI: no nausea, no vomiting, no diarrhea, no constipation, no melena, no hematochezia, no hematemesis, no abdominal pain, no increase in abdominal girth  : no dysuria, no hematuria, no discharge  GYN: no abnormal vaginal bleeding, no dyspareunia, no vaginal discharge  INTEGUMENT: no rashes, no abnormal bruising, no nail pitting, no hyperpigmentation  NEURO: no falls, no memory loss, no paresthesias or dysesthesias, no urofecal incontinence or retention, no loss of strength on any extremity  MSK: +chronic lower back pain, no new joint pain but chronic b/l knee pain, Right knee replacement on 8/17/2023 with right knee swelling.  PSYCH: no suicidal or homicidal ideation, no depression, no insomnia, no anhedonia  ENDOCRINE: no heat or cold intolerance, no polyuria, no polydipsia        Objective:      Physical Exam  Vitals:    11/11/24 1351   BP: (!) 147/82   Pulse: 82   Resp: 14             ECOG PS 1  GA: AAOx3, NAD  HEENT: NCAT, , EOMI, moist oral mucous membrane  LYMPH: no cervical, inguinal or supraclavicular adenopathy. Small b/l  axillary LNs, largest about 2cm in right axilla, others around 1cm  CVS: s1s2 RRR, no M/R/G  RESP: CTA b/l, no crackles, no wheezes or rhonchi  ABD: soft, NT, ND, BS+, no hepatosplenomegaly  EXT: no deformities, b/l pitting pedal edema  SKIN: no rashes, no bruises or purpura, warm and dry  NEURO: normal mentation, strength, no sensory deficits    Assessment and Plan       Problem List Items Addressed This Visit          Oncology    Lymphoma, small lymphocytic - Primary         # Lymphoma, small lymphocytic    Findings are most consistent with SLL Lugano stage II vs CLL Juarez I. She does likely have peripheral blood involvement as well  In any case, no anemia, no thrombocytopenia, no B symptoms, only 2 narciso stations involved (b/l axilla) and 13q del which confers good prognosis. No IGHV available but I do not think this is necessary at this time  Had systemic imaging with CT N/C/A/P w/ IV contrast 2/2021 without any bulky disease  No further workup needed at this time. Will observe every 12-16 weeks. If any indication arises, then she will also need PET CT and bone marrow biopsy    Patient had a robotic assisted left total hip arthroplasty done on 01/04/2024  Plan   Reviewed labs and noted slightly elevated WBC 14.32 with normal hgb/plt  Follow-up in 4 months with above workup to discuss further recommendations.  Patient was advised to call our clinic if she were to have constitutional symptoms    A total of  30 minutes were spent in review of records, interpretation of test, coordination of care, discussion and counseling with the patient.

## 2024-12-16 ENCOUNTER — OFFICE VISIT (OUTPATIENT)
Dept: ORTHOPEDICS | Facility: CLINIC | Age: 74
End: 2024-12-16
Payer: MEDICARE

## 2024-12-16 VITALS — WEIGHT: 181.63 LBS | HEIGHT: 60 IN | BODY MASS INDEX: 35.66 KG/M2

## 2024-12-16 DIAGNOSIS — S52.501A CLOSED FRACTURE OF RIGHT DISTAL RADIUS: Primary | ICD-10-CM

## 2024-12-16 DIAGNOSIS — S52.501A CLOSED FRACTURE OF RIGHT DISTAL RADIUS: ICD-10-CM

## 2024-12-16 DIAGNOSIS — S52.501A NONDISPLACED FRACTURE OF DISTAL END OF RIGHT RADIUS: Primary | ICD-10-CM

## 2024-12-16 PROCEDURE — 99214 OFFICE O/P EST MOD 30 MIN: CPT | Mod: ,,, | Performed by: PHYSICIAN ASSISTANT

## 2024-12-16 NOTE — PROGRESS NOTES
Chief Complaint:   Chief Complaint   Patient presents with    Right Wrist - Injury     Rt distal radius fx (DOI: 12/12/24) - patient was getting up from a rolling chair and it rolled out from under her. Patient was taken to the ER on 12/13/24. Presents today in a splint. Pain with movement. Patient has full ROM of her fingers. Denies any numbness or tingling.        History of present illness:    74-year-old left-hand dominant female presents office today for evaluation for right wrist fracture.  Four days ago she fell getting up from a rolling chair landing on the outstretched right hand.  She noted pain and swelling throughout the day and ultimately went to the emergency room.  She was diagnosed with a distal radius fracture and put into a well-padded splint.  She was sent to Orthopedics for further evaluation.  She has mild discomfort and swelling noted into the fingers.  Denies any numbness or tingling    Past Medical History:   Diagnosis Date    Acid reflux     Arthritis     chronic lymphocytic leukemia     Hypertension     Primary osteoarthritis of left hip 01/04/2024    Renal cyst     Sleep apnea     non compliant with cpap       Past Surgical History:   Procedure Laterality Date    BREAST SURGERY Left 1991?    lumpectomy, NO Restrictions    CARPAL TUNNEL RELEASE      CATARACT EXTRACTION W/ INTRAOCULAR LENS IMPLANT Bilateral     CATARACT EXTRACTION, BILATERAL      CYST REMOVAL      EPIDURAL STEROID INJECTION      EXCISION OF THYROGLOSSAL DUCT CYST      INJECTION OF JOINT Right 10/22/2024    Procedure: Injection, Joint, Shoulder, Hip, Or Knee;  Surgeon: Isidro Barone MD;  Location: [facility];  Service: Orthopedics;  Laterality: Right;  Right hip intra-articular corticosteroid injection under fluoroscopy   No sedation    JOINT REPLACEMENT  07/21    Left knee    KNEE JOINT MANIPULATION Left 08/29/2022    Procedure: MANIPULATION, KNEE;  Surgeon: Sohan Bowens MD;  Location: [facility] OR;  Service: Orthopedics;   Laterality: Left;    REVISION OF KNEE ARTHROPLASTY Left 06/08/2022    Procedure: REVISION, ARTHROPLASTY, KNEE;  Surgeon: Sohan Bowens MD;  Location: [facility] OR;  Service: Orthopedics;  Laterality: Left;  FEMUR / PATHOLOGY / ASHLEY    ROBOTIC ARTHROPLASTY, HIP Left 01/04/2024    Procedure: ROBOTIC ARTHROPLASTY,HIP;  Surgeon: Isidro Barone MD;  Location: [facility] OR;  Service: Orthopedics;  Laterality: Left;    ROBOTIC ARTHROPLASTY, KNEE Right 08/17/2023    Procedure: ROBOTIC ARTHROPLASTY, KNEE, TOTAL;  Surgeon: Isidro Barone MD;  Location: [facility] OR;  Service: Orthopedics;  Laterality: Right;    SHOULDER SURGERY Right     SINUS SURGERY      x 2    SPINE SURGERY  05/21    Bulging disc in lower back    TONSILLECTOMY         Current Outpatient Medications   Medication Sig    B-complex with vitamin C (Z-BEC OR EQUIV) tablet Take 1 tablet by mouth once daily.    cholecalciferol, vitamin D3, 125 mcg (5,000 unit) capsule Take 5,000 Units by mouth once daily.    lisdexamfetamine (VYVANSE) 30 MG capsule Take 30 mg by mouth every morning.    metoprolol tartrate (LOPRESSOR) 50 MG tablet Take 50 mg by mouth once daily at 6am.    multivitamin with minerals tablet Take 1 tablet by mouth once daily.    pantoprazole (PROTONIX) 40 MG tablet 40 mg once daily.    polysaccharide iron complex 200 mg iron Cap Take 1 capsule by mouth Daily.    pramipexole (MIRAPEX) 1.5 MG tablet Take 1.5 mg by mouth 2 (two) times a day.    telmisartan-hydrochlorothiazide (MICARDIS HCT) 40-12.5 mg per tablet Take 1 tablet by mouth once daily.    vitamin B complex (B COMPLEX 1 ORAL) Take 1 tablet by mouth once daily.    vitamin E 100 UNIT capsule Take 100 Units by mouth once daily.     No current facility-administered medications for this visit.     Facility-Administered Medications Ordered in Other Visits   Medication    vancomycin (VANCOCIN) 1,000 mg in dextrose 5 % (D5W) 250 mL IVPB (Vial-Mate)       Review of patient's allergies indicates:   Allergen  Reactions    Duloxetine      Other reaction(s): Bradycardia, Hypotension, Vomit    Lorazepam      Other reaction(s): Confusion  amnesia      Tramadol Other (See Comments)     nightmares       Family History   Problem Relation Name Age of Onset    Hypertension Mother Carly     COPD Mother Carly     Hearing loss Mother Carly     Hypertension Father Kevin     COPD Father Kevin     Hearing loss Father Kevin     Breast cancer Sister Jacquelyn     Cancer Sister Jacquelyn     Diabetes Maternal Grandmother Linda Huang    Diabetes Paternal Grandmother Reina        Social History     Socioeconomic History    Marital status:    Tobacco Use    Smoking status: Former     Current packs/day: 0.00     Average packs/day: 1 pack/day for 10.0 years (10.0 ttl pk-yrs)     Types: Cigarettes     Start date: 3/18/1976     Quit date: 3/18/1986     Years since quittin.7    Smokeless tobacco: Never   Substance and Sexual Activity    Alcohol use: Not Currently    Drug use: Never    Sexual activity: Yes     Partners: Male     Birth control/protection: Post-menopausal         Review of Systems:    Constitution:   Denies chills, fever, and sweats.  HENT:   Denies headaches or blurry vision.  Cardiovascular:  Denies chest pain or irregular heart beat.  Respiratory:   Denies cough or shortness of breath.  Gastrointestinal:  Denies abdominal pain, nausea, or vomiting.  Musculoskeletal:   Denies muscle cramps.  Neurological:   Denies dizziness or focal weakness.  Psychiatric/Behavior: Normal mental status.  Hematology/Lymph:  Denies bleeding problem or easy bruising/bleeding.  Skin:    Denies rash or suspicious lesions.    Examination:    Vital Signs:    Vitals:    24 1413   Weight: 82.4 kg (181 lb 9.6 oz)   Height: 5' (1.524 m)       Body mass index is 35.47 kg/m².    Constitution:   Well-developed, well nourished patient in no acute distress.  Neurological:   Alert and oriented x 3 and cooperative to examination.      Psychiatric/Behavior: Normal mental status.  Respiratory:   No shortness of breath.  Nonlabored breathing  Cardiovascular:           Regular rate and rhythm  Eyes:    Extraoccular muscles intact  Skin:    No scars, rash or suspicious lesions.    Physical Exam:     Right wrist exam   Splint has been removed   She has some mild swelling dorsally and volarly.  Ecchymosis seen as well.    She has tenderness over the distal radius   Tenderness over the base of the 1st CMC joint   No ulnar-sided tenderness   No tenderness over the scaphoid   Intact range of motion with some discomfort through the wrist   Intact digital range of motion   Sensation intact distally   Intact elbow range of motion with no pain   Radial pulses 2+    Right wrist radiographs reviewed showing a nondisplaced intra-articular distal radius fracture        Assessment:     Nondisplaced fracture of distal end of right radius        Plan:      I have discussed exam and x-ray findings with the patient and her  today.  She has a stable nondisplaced intra-articular distal radius fracture.  I would like to treat this in a Exos brace.  No lifting with the right hand.  Follow up 2 weeks for repeat radiographs of the right wrist.        Follow up in about 2 weeks (around 12/30/2024).    DISCLAIMER: This note may have been dictated using voice recognition software and may contain grammatical errors.

## 2024-12-30 ENCOUNTER — OFFICE VISIT (OUTPATIENT)
Dept: ORTHOPEDICS | Facility: CLINIC | Age: 74
End: 2024-12-30
Payer: MEDICARE

## 2024-12-30 ENCOUNTER — HOSPITAL ENCOUNTER (OUTPATIENT)
Dept: RADIOLOGY | Facility: CLINIC | Age: 74
Discharge: HOME | End: 2024-12-30
Attending: PHYSICIAN ASSISTANT
Payer: MEDICARE

## 2024-12-30 VITALS
HEART RATE: 78 BPM | WEIGHT: 181.69 LBS | BODY MASS INDEX: 35.67 KG/M2 | DIASTOLIC BLOOD PRESSURE: 78 MMHG | SYSTOLIC BLOOD PRESSURE: 136 MMHG | HEIGHT: 60 IN

## 2024-12-30 DIAGNOSIS — S52.501A NONDISPLACED FRACTURE OF DISTAL END OF RIGHT RADIUS: Primary | ICD-10-CM

## 2024-12-30 DIAGNOSIS — S52.501A NONDISPLACED FRACTURE OF DISTAL END OF RIGHT RADIUS: ICD-10-CM

## 2024-12-30 PROCEDURE — 73110 X-RAY EXAM OF WRIST: CPT | Mod: RT,,, | Performed by: PHYSICIAN ASSISTANT

## 2024-12-30 PROCEDURE — 99213 OFFICE O/P EST LOW 20 MIN: CPT | Mod: ,,, | Performed by: PHYSICIAN ASSISTANT

## 2025-01-08 ENCOUNTER — OFFICE VISIT (OUTPATIENT)
Dept: ORTHOPEDICS | Facility: CLINIC | Age: 75
End: 2025-01-08
Payer: MEDICARE

## 2025-01-08 ENCOUNTER — HOSPITAL ENCOUNTER (OUTPATIENT)
Dept: RADIOLOGY | Facility: CLINIC | Age: 75
Discharge: HOME | End: 2025-01-08
Attending: SPECIALIST
Payer: MEDICARE

## 2025-01-08 VITALS
SYSTOLIC BLOOD PRESSURE: 136 MMHG | HEART RATE: 88 BPM | WEIGHT: 182.38 LBS | BODY MASS INDEX: 35.81 KG/M2 | DIASTOLIC BLOOD PRESSURE: 87 MMHG | HEIGHT: 60 IN

## 2025-01-08 DIAGNOSIS — M54.16 LUMBAR RADICULOPATHY: Primary | ICD-10-CM

## 2025-01-08 DIAGNOSIS — M51.362 DEGENERATION OF INTERVERTEBRAL DISC OF LUMBAR REGION WITH DISCOGENIC BACK PAIN AND LOWER EXTREMITY PAIN: ICD-10-CM

## 2025-01-08 DIAGNOSIS — Z96.642 S/P TOTAL LEFT HIP ARTHROPLASTY: ICD-10-CM

## 2025-01-08 PROCEDURE — 73502 X-RAY EXAM HIP UNI 2-3 VIEWS: CPT | Mod: LT,,, | Performed by: SPECIALIST

## 2025-01-08 PROCEDURE — 99213 OFFICE O/P EST LOW 20 MIN: CPT | Mod: ,,, | Performed by: SPECIALIST

## 2025-01-08 RX ORDER — DIAZEPAM 5 MG/1
10 TABLET ORAL ONCE
Qty: 1 TABLET | Refills: 0 | Status: SHIPPED | OUTPATIENT
Start: 2025-01-08 | End: 2025-01-08

## 2025-01-08 RX ORDER — PREGABALIN 50 MG/1
CAPSULE ORAL
COMMUNITY
Start: 2024-12-16

## 2025-01-08 NOTE — PROGRESS NOTES
Past Medical History:   Diagnosis Date    Acid reflux     Arthritis     chronic lymphocytic leukemia     Hypertension     Primary osteoarthritis of left hip 01/04/2024    Renal cyst     Sleep apnea     non compliant with cpap       Past Surgical History:   Procedure Laterality Date    BREAST SURGERY Left 1991?    lumpectomy, NO Restrictions    CARPAL TUNNEL RELEASE      CATARACT EXTRACTION W/ INTRAOCULAR LENS IMPLANT Bilateral     CATARACT EXTRACTION, BILATERAL      CYST REMOVAL      EPIDURAL STEROID INJECTION      EXCISION OF THYROGLOSSAL DUCT CYST      INJECTION OF JOINT Right 10/22/2024    Procedure: Injection, Joint, Shoulder, Hip, Or Knee;  Surgeon: Isidro Barone MD;  Location: [facility] OR;  Service: Orthopedics;  Laterality: Right;  Right hip intra-articular corticosteroid injection under fluoroscopy   No sedation    JOINT REPLACEMENT  07/21    Left knee    KNEE JOINT MANIPULATION Left 08/29/2022    Procedure: MANIPULATION, KNEE;  Surgeon: Sohan Bowens MD;  Location: [facility] OR;  Service: Orthopedics;  Laterality: Left;    REVISION OF KNEE ARTHROPLASTY Left 06/08/2022    Procedure: REVISION, ARTHROPLASTY, KNEE;  Surgeon: Sohan Bowens MD;  Location: [facility] OR;  Service: Orthopedics;  Laterality: Left;  FEMUR / PATHOLOGY / ASHLEY    ROBOTIC ARTHROPLASTY, HIP Left 01/04/2024    Procedure: ROBOTIC ARTHROPLASTY,HIP;  Surgeon: Isidro Barone MD;  Location: [facility] OR;  Service: Orthopedics;  Laterality: Left;    ROBOTIC ARTHROPLASTY, KNEE Right 08/17/2023    Procedure: ROBOTIC ARTHROPLASTY, KNEE, TOTAL;  Surgeon: Isidro Barone MD;  Location: [facility] OR;  Service: Orthopedics;  Laterality: Right;    SHOULDER SURGERY Right     SINUS SURGERY      x 2    SPINE SURGERY  05/21    Bulging disc in lower back    TONSILLECTOMY         Current Outpatient Medications   Medication Sig    B-complex with vitamin C (Z-BEC OR EQUIV) tablet Take 1 tablet by mouth once daily.    cholecalciferol, vitamin D3, 125 mcg (5,000 unit) capsule  Take 5,000 Units by mouth once daily.    lisdexamfetamine (VYVANSE) 30 MG capsule Take 30 mg by mouth every morning.    metoprolol tartrate (LOPRESSOR) 50 MG tablet Take 50 mg by mouth once daily at 6am.    multivitamin with minerals tablet Take 1 tablet by mouth once daily.    pantoprazole (PROTONIX) 40 MG tablet 40 mg once daily.    polysaccharide iron complex 200 mg iron Cap Take 1 capsule by mouth Daily.    pramipexole (MIRAPEX) 1.5 MG tablet Take 1.5 mg by mouth 2 (two) times a day.    pregabalin (LYRICA) 50 MG capsule Take 1 capsule twice a day by oral route for 30 days.    telmisartan-hydrochlorothiazide (MICARDIS HCT) 40-12.5 mg per tablet Take 1 tablet by mouth once daily.    vitamin B complex (B COMPLEX 1 ORAL) Take 1 tablet by mouth once daily.    vitamin E 100 UNIT capsule Take 100 Units by mouth once daily.     No current facility-administered medications for this visit.     Facility-Administered Medications Ordered in Other Visits   Medication    vancomycin (VANCOCIN) 1,000 mg in dextrose 5 % (D5W) 250 mL IVPB (Vial-Mate)       Review of patient's allergies indicates:   Allergen Reactions    Duloxetine      Other reaction(s): Bradycardia, Hypotension, Vomit    Lorazepam      Other reaction(s): Confusion  amnesia      Tramadol Other (See Comments)     nightmares       Family History   Problem Relation Name Age of Onset    Hypertension Mother Carly     COPD Mother Carly     Hearing loss Mother Carly     Hypertension Father Kevin     COPD Father Kevin     Hearing loss Father Kevin     Breast cancer Sister Jacquelyn     Cancer Sister Jacquelyn     Diabetes Maternal Grandmother Linda Huang    Diabetes Paternal Grandmother Reina        Social History     Socioeconomic History    Marital status:    Tobacco Use    Smoking status: Former     Current packs/day: 0.00     Average packs/day: 1 pack/day for 10.0 years (10.0 ttl pk-yrs)     Types: Cigarettes     Start date: 3/18/1976     Quit  date: 3/18/1986     Years since quittin.8    Smokeless tobacco: Never   Substance and Sexual Activity    Alcohol use: Not Currently    Drug use: Never    Sexual activity: Yes     Partners: Male     Birth control/protection: Post-menopausal       Chief Complaint:   Chief Complaint   Patient presents with    Follow-up     L MARRY sx 2024 - reports no complaints.        Consulting Physician: No ref. provider found    History of present illness:    This is a 74 y.o. year old female who complains of no pain in the left hip.  But has pain in both calves and feet and ankles along with occasional numbness.  She has had lumbar surgery for stenosis in the past.    Review of Systems:    Constitution:   Denies chills, fever, and sweats.  HENT:   Denies headaches or blurry vision.  Cardiovascular:  Denies chest pain or irregular heart beat.  Respiratory:   Denies cough or shortness of breath.  Gastrointestinal:  Denies abdominal pain, nausea, or vomiting.  Musculoskeletal:   Denies muscle cramps.  Neurological:   Denies dizziness or focal weakness.  Psychiatric/Behavior: Normal mental status.  Hematology/Lymph:  Denies bleeding problem or easy bruising/bleeding.  Skin:    Denies rash or suspicious lesions.    Examination:    Vital Signs:    Vitals:    25 1437   BP: 136/87   Pulse: 88   Weight: 82.7 kg (182 lb 6.4 oz)   Height: 5' (1.524 m)       Body mass index is 35.62 kg/m².    Constitution:   Well-developed, well nourished patient in no acute distress.  Neurological:   Alert and oriented x 3 and cooperative to examination.     Psychiatric/Behavior: Normal mental status.  Respiratory:   No shortness of breath.non labored breathing.  Cardiovascular: Regular rate and rhythm  Eyes:    Extraoccular muscles intact  Skin:    No scars, rash or suspicious lesions.    Physical Exam:  Lumbar exam:   Tenderness with forward flexion and extension as well as right and left lateral bend   Tenderness in the sciatic notches  bilaterally as well as both right and left iliolumbar regions   Positive straight leg raise   Decreased reflexes with 1/4 knee kick and ankle jerk reflexes bilaterally   No clonus   2+ pulses with intact sensory and motor function grossly  Normal gait but she ambulates with a stooped over flexed lumbar posture    Left Hip     No swelling, induration or tenderness    No increased heat    No tenderness    Well healed scar    No instability of the hip was noted. Motion was normal.     No abductor weakness    No weakness was observed.    Sensation intact distally    Intact pedal pulses    Normal motor function    Normal gait    Complete left hip x-ray with two or more views of the AP and lateral aspects were performed.     Impressions Radiology Test   X-ray of hip was performed showed intact hip prosthesis without signs of loosening or subsidence.    Imaging: X-rays ordered and images interpreted today personally by me of AP pelvis and AP and lateral of the left hip which show pristine interfaces and stable well-aligned left total hip arthroplasty.  Lumbar degenerative disc disease evident with hardware in place.  Impression: As above.         Assessment: Lumbar radiculopathy    S/P total left hip arthroplasty  -     X-Ray Hip 2 or 3 views Left with Pelvis when performed; Future; Expected date: 01/08/2025    Degeneration of intervertebral disc of lumbar region with discogenic back pain and lower extremity pain        Plan:  MRI of the lumbar spine to further evaluate if her lower extremity pain is lumbar related or if this is intrinsic neuropathy.  We will set up telemedicine visit to go over results and plan next steps.      DISCLAIMER: This note may have been dictated using voice recognition software and may contain grammatical errors.     NOTE: Consult report sent to referring provider via [employer].

## 2025-01-14 ENCOUNTER — HOSPITAL ENCOUNTER (OUTPATIENT)
Dept: RADIOLOGY | Facility: HOSPITAL | Age: 75
Discharge: HOME | End: 2025-01-14
Attending: SPECIALIST
Payer: MEDICARE

## 2025-01-14 DIAGNOSIS — M54.16 LUMBAR RADICULOPATHY: ICD-10-CM

## 2025-01-14 DIAGNOSIS — M51.362 DEGENERATION OF INTERVERTEBRAL DISC OF LUMBAR REGION WITH DISCOGENIC BACK PAIN AND LOWER EXTREMITY PAIN: ICD-10-CM

## 2025-01-14 PROCEDURE — 72148 MRI LUMBAR SPINE W/O DYE: CPT | Mod: TC

## 2025-01-31 ENCOUNTER — OFFICE VISIT (OUTPATIENT)
Dept: ORTHOPEDICS | Facility: CLINIC | Age: 75
End: 2025-01-31
Payer: MEDICARE

## 2025-01-31 ENCOUNTER — HOSPITAL ENCOUNTER (OUTPATIENT)
Dept: RADIOLOGY | Facility: CLINIC | Age: 75
Discharge: HOME | End: 2025-01-31
Attending: PHYSICIAN ASSISTANT
Payer: MEDICARE

## 2025-01-31 VITALS
WEIGHT: 182.31 LBS | BODY MASS INDEX: 35.79 KG/M2 | DIASTOLIC BLOOD PRESSURE: 79 MMHG | HEART RATE: 66 BPM | HEIGHT: 60 IN | SYSTOLIC BLOOD PRESSURE: 130 MMHG

## 2025-01-31 DIAGNOSIS — S52.501A NONDISPLACED FRACTURE OF DISTAL END OF RIGHT RADIUS: Primary | ICD-10-CM

## 2025-01-31 DIAGNOSIS — S52.501A NONDISPLACED FRACTURE OF DISTAL END OF RIGHT RADIUS: ICD-10-CM

## 2025-01-31 PROCEDURE — 73110 X-RAY EXAM OF WRIST: CPT | Mod: RT,,, | Performed by: PHYSICIAN ASSISTANT

## 2025-01-31 PROCEDURE — 99213 OFFICE O/P EST LOW 20 MIN: CPT | Mod: ,,, | Performed by: PHYSICIAN ASSISTANT

## 2025-01-31 NOTE — PROGRESS NOTES
Chief Complaint:   Chief Complaint   Patient presents with    Follow-up     f/u w/ repeat XR -RT DISTAL RADIUS FX (DOI: 12/12/24) states the wrist is okay presents in brace today. States a little pain every now and then but nothing significant.        History of present illness:    74-year-old female presents today for for week follow-up on her right wrist distal radius fracture.  She has been wearing her Exos brace with good pain control.  She is now 6 weeks out from her nondisplaced intra-articular distal radius fracture.      Four days ago she fell getting up from a rolling chair landing on the outstretched right hand.  She noted pain and swelling throughout the day and ultimately went to the emergency room.  She was diagnosed with a distal radius fracture and put into a well-padded splint.  She was sent to Orthopedics for further evaluation.  She has mild discomfort and swelling noted into the fingers.  Denies any numbness or tingling    Past Medical History:   Diagnosis Date    Acid reflux     Arthritis     chronic lymphocytic leukemia     Hypertension     Primary osteoarthritis of left hip 01/04/2024    Renal cyst     Sleep apnea     non compliant with cpap       Past Surgical History:   Procedure Laterality Date    BREAST SURGERY Left 1991?    lumpectomy, NO Restrictions    CARPAL TUNNEL RELEASE      CATARACT EXTRACTION W/ INTRAOCULAR LENS IMPLANT Bilateral     CATARACT EXTRACTION, BILATERAL      CYST REMOVAL      EPIDURAL STEROID INJECTION      EXCISION OF THYROGLOSSAL DUCT CYST      INJECTION OF JOINT Right 10/22/2024    Procedure: Injection, Joint, Shoulder, Hip, Or Knee;  Surgeon: Isidro Barone MD;  [facility]: [facility];  Service: Orthopedics;  Laterality: Right;  Right hip intra-articular corticosteroid injection under fluoroscopy   No sedation    JOINT REPLACEMENT  07/21    Left knee    KNEE JOINT MANIPULATION Left 08/29/2022    Procedure: MANIPULATION, KNEE;  Surgeon: Sohan Bowens MD;  Location:  [facility] OR;  Service: Orthopedics;  Laterality: Left;    REVISION OF KNEE ARTHROPLASTY Left 06/08/2022    Procedure: REVISION, ARTHROPLASTY, KNEE;  Surgeon: Sohan Bowens MD;  Location: [facility] OR;  Service: Orthopedics;  Laterality: Left;  FEMUR / PATHOLOGY / ASHLEY    ROBOTIC ARTHROPLASTY, HIP Left 01/04/2024    Procedure: ROBOTIC ARTHROPLASTY,HIP;  Surgeon: Isidro Barone MD;  Location: [facility] OR;  Service: Orthopedics;  Laterality: Left;    ROBOTIC ARTHROPLASTY, KNEE Right 08/17/2023    Procedure: ROBOTIC ARTHROPLASTY, KNEE, TOTAL;  Surgeon: Isidro Barone MD;  Location: [facility] OR;  Service: Orthopedics;  Laterality: Right;    SHOULDER SURGERY Right     SINUS SURGERY      x 2    SPINE SURGERY  05/21    Bulging disc in lower back    TONSILLECTOMY         Current Outpatient Medications   Medication Sig    B-complex with vitamin C (Z-BEC OR EQUIV) tablet Take 1 tablet by mouth once daily.    cholecalciferol, vitamin D3, 125 mcg (5,000 unit) capsule Take 5,000 Units by mouth once daily.    lisdexamfetamine (VYVANSE) 30 MG capsule Take 30 mg by mouth every morning.    metoprolol tartrate (LOPRESSOR) 50 MG tablet Take 50 mg by mouth once daily at 6am.    multivitamin with minerals tablet Take 1 tablet by mouth once daily.    pantoprazole (PROTONIX) 40 MG tablet 40 mg once daily.    polysaccharide iron complex 200 mg iron Cap Take 1 capsule by mouth Daily.    pramipexole (MIRAPEX) 1.5 MG tablet Take 1.5 mg by mouth 2 (two) times a day.    telmisartan-hydrochlorothiazide (MICARDIS HCT) 40-12.5 mg per tablet Take 1 tablet by mouth once daily.    vitamin B complex (B COMPLEX 1 ORAL) Take 1 tablet by mouth once daily.    vitamin E 100 UNIT capsule Take 100 Units by mouth once daily.    diazePAM (VALIUM) 5 MG tablet Take 2 tablets (10 mg total) by mouth once. Take 30-45 minutes prior to MRI for 1 dose    pregabalin (LYRICA) 50 MG capsule Take 1 capsule twice a day by oral route for 30 days.     No current facility-administered  medications for this visit.     Facility-Administered Medications Ordered in Other Visits   Medication    vancomycin (VANCOCIN) 1,000 mg in dextrose 5 % (D5W) 250 mL IVPB (Vial-Mate)       Review of patient's allergies indicates:   Allergen Reactions    Duloxetine      Other reaction(s): Bradycardia, Hypotension, Vomit    Lorazepam      Other reaction(s): Confusion  amnesia      Tramadol Other (See Comments)     nightmares       Family History   Problem Relation Name Age of Onset    Hypertension Mother Carly     COPD Mother Carly     Hearing loss Mother Carly     Hypertension Father Kevin     COPD Father Kevin     Hearing loss Father Kevin     Breast cancer Sister Jacquelyn     Cancer Sister Jacquelyn     Diabetes Maternal Grandmother Linda Huang    Diabetes Paternal Grandmother Reina        Social History     Socioeconomic History    Marital status:    Tobacco Use    Smoking status: Former     Current packs/day: 0.00     Average packs/day: 1 pack/day for 10.0 years (10.0 ttl pk-yrs)     Types: Cigarettes     Start date: 3/18/1976     Quit date: 3/18/1986     Years since quittin.9    Smokeless tobacco: Never   Substance and Sexual Activity    Alcohol use: Not Currently    Drug use: Never    Sexual activity: Yes     Partners: Male     Birth control/protection: Post-menopausal         Review of Systems:    Constitution:   Denies chills, fever, and sweats.  HENT:   Denies headaches or blurry vision.  Cardiovascular:  Denies chest pain or irregular heart beat.  Respiratory:   Denies cough or shortness of breath.  Gastrointestinal:  Denies abdominal pain, nausea, or vomiting.  Musculoskeletal:   Denies muscle cramps.  Neurological:   Denies dizziness or focal weakness.  Psychiatric/Behavior: Normal mental status.  Hematology/Lymph:  Denies bleeding problem or easy bruising/bleeding.  Skin:    Denies rash or suspicious lesions.    Examination:    Vital Signs:    Vitals:    25 0949   BP:  130/79   Pulse: 66   Weight: 82.7 kg (182 lb 5.1 oz)   Height: 5' (1.524 m)       Body mass index is 35.61 kg/m².    Constitution:   Well-developed, well nourished patient in no acute distress.  Neurological:   Alert and oriented x 3 and cooperative to examination.     Psychiatric/Behavior: Normal mental status.  Respiratory:   No shortness of breath.  Nonlabored breathing  Cardiovascular:           Regular rate and rhythm  Eyes:    Extraoccular muscles intact  Skin:    No scars, rash or suspicious lesions.    Physical Exam:     Right wrist exam   No swelling or bruising noted   She has no tenderness over the distal radius   No Tenderness over the base of the 1st CMC joint   No ulnar-sided tenderness   No tenderness over the scaphoid   Intact range of motion with some discomfort through the wrist   Intact digital range of motion   Sensation intact distally   Intact elbow range of motion with no pain   Radial pulses 2+    Right wrist radiographs taken today, three views show a nondisplaced intra-articular distal radius fracture with good interval healing        Assessment:     Nondisplaced fracture of distal end of right radius  -     X-Ray Wrist Complete Right; Future; Expected date: 01/31/2025        Plan:      Her fracture is essentially healed.  She can discontinue the brace and start activities as tolerated.  No lifting greater than 5-10 lb with the next couple weeks and then transition to normal activity.  She will follow up with us as needed        No follow-ups on file.    DISCLAIMER: This note may have been dictated using voice recognition software and may contain grammatical errors.

## 2025-02-05 ENCOUNTER — OFFICE VISIT (OUTPATIENT)
Dept: ORTHOPEDICS | Facility: CLINIC | Age: 75
End: 2025-02-05
Payer: MEDICARE

## 2025-02-05 DIAGNOSIS — M51.362 DEGENERATION OF INTERVERTEBRAL DISC OF LUMBAR REGION WITH DISCOGENIC BACK PAIN AND LOWER EXTREMITY PAIN: ICD-10-CM

## 2025-02-05 DIAGNOSIS — M54.16 LUMBAR RADICULOPATHY: Primary | ICD-10-CM

## 2025-02-05 PROCEDURE — 98016 BRIEF COMUNICAJ TECH-BSD SVC: CPT | Mod: 95,,, | Performed by: SPECIALIST

## 2025-02-05 NOTE — PROGRESS NOTES
Past Medical History:   Diagnosis Date    Acid reflux     Arthritis     chronic lymphocytic leukemia     Hypertension     Primary osteoarthritis of left hip 01/04/2024    Renal cyst     Sleep apnea     non compliant with cpap       Past Surgical History:   Procedure Laterality Date    BREAST SURGERY Left 1991?    lumpectomy, NO Restrictions    CARPAL TUNNEL RELEASE      CATARACT EXTRACTION W/ INTRAOCULAR LENS IMPLANT Bilateral     CATARACT EXTRACTION, BILATERAL      CYST REMOVAL      EPIDURAL STEROID INJECTION      EXCISION OF THYROGLOSSAL DUCT CYST      INJECTION OF JOINT Right 10/22/2024    Procedure: Injection, Joint, Shoulder, Hip, Or Knee;  Surgeon: Isidro Barone MD;  Location: [facility] OR;  Service: Orthopedics;  Laterality: Right;  Right hip intra-articular corticosteroid injection under fluoroscopy   No sedation    JOINT REPLACEMENT  07/21    Left knee    KNEE JOINT MANIPULATION Left 08/29/2022    Procedure: MANIPULATION, KNEE;  Surgeon: Sohan Bowens MD;  Location: [facility] OR;  Service: Orthopedics;  Laterality: Left;    REVISION OF KNEE ARTHROPLASTY Left 06/08/2022    Procedure: REVISION, ARTHROPLASTY, KNEE;  Surgeon: Sohan Bowens MD;  Location: [facility] OR;  Service: Orthopedics;  Laterality: Left;  FEMUR / PATHOLOGY / ASHLEY    ROBOTIC ARTHROPLASTY, HIP Left 01/04/2024    Procedure: ROBOTIC ARTHROPLASTY,HIP;  Surgeon: Isidro Barone MD;  Location: [facility] OR;  Service: Orthopedics;  Laterality: Left;    ROBOTIC ARTHROPLASTY, KNEE Right 08/17/2023    Procedure: ROBOTIC ARTHROPLASTY, KNEE, TOTAL;  Surgeon: Isidro Barone MD;  Location: [facility] OR;  Service: Orthopedics;  Laterality: Right;    SHOULDER SURGERY Right     SINUS SURGERY      x 2    SPINE SURGERY  05/21    Bulging disc in lower back    TONSILLECTOMY         Current Outpatient Medications   Medication Sig    B-complex with vitamin C (Z-BEC OR EQUIV) tablet Take 1 tablet by mouth once daily.    cholecalciferol, vitamin D3, 125 mcg (5,000 unit) capsule  Take 5,000 Units by mouth once daily.    diazePAM (VALIUM) 5 MG tablet Take 2 tablets (10 mg total) by mouth once. Take 30-45 minutes prior to MRI for 1 dose    lisdexamfetamine (VYVANSE) 30 MG capsule Take 30 mg by mouth every morning.    metoprolol tartrate (LOPRESSOR) 50 MG tablet Take 50 mg by mouth once daily at 6am.    multivitamin with minerals tablet Take 1 tablet by mouth once daily.    pantoprazole (PROTONIX) 40 MG tablet 40 mg once daily.    polysaccharide iron complex 200 mg iron Cap Take 1 capsule by mouth Daily.    pramipexole (MIRAPEX) 1.5 MG tablet Take 1.5 mg by mouth 2 (two) times a day.    pregabalin (LYRICA) 50 MG capsule Take 1 capsule twice a day by oral route for 30 days.    telmisartan-hydrochlorothiazide (MICARDIS HCT) 40-12.5 mg per tablet Take 1 tablet by mouth once daily.    vitamin B complex (B COMPLEX 1 ORAL) Take 1 tablet by mouth once daily.    vitamin E 100 UNIT capsule Take 100 Units by mouth once daily.     No current facility-administered medications for this visit.     Facility-Administered Medications Ordered in Other Visits   Medication    vancomycin (VANCOCIN) 1,000 mg in dextrose 5 % (D5W) 250 mL IVPB (Vial-Mate)       Review of patient's allergies indicates:   Allergen Reactions    Duloxetine      Other reaction(s): Bradycardia, Hypotension, Vomit    Lorazepam      Other reaction(s): Confusion  amnesia      Tramadol Other (See Comments)     nightmares       Family History   Problem Relation Name Age of Onset    Hypertension Mother Carly     COPD Mother Carly     Hearing loss Mother Carly     Hypertension Father Kevin     COPD Father Kevin     Hearing loss Father Kevin     Breast cancer Sister Jacquelyn     Cancer Sister Jacquelyn     Diabetes Maternal Grandmother Linda Huang    Diabetes Paternal Grandmother Reina        Social History     Socioeconomic History    Marital status:    Tobacco Use    Smoking status: Former     Current packs/day: 0.00      Average packs/day: 1 pack/day for 10.0 years (10.0 ttl pk-yrs)     Types: Cigarettes     Start date: 3/18/1976     Quit date: 3/18/1986     Years since quittin.9    Smokeless tobacco: Never   Substance and Sexual Activity    Alcohol use: Not Currently    Drug use: Never    Sexual activity: Yes     Partners: Male     Birth control/protection: Post-menopausal       Chief Complaint:   Chief Complaint   Patient presents with    Results     MRI L- spine results        Consulting Physician: No ref. provider found    History of present illness:    This is a 74 y.o. year old female who complains of continued back pain and radiculopathy.  This is a telemedicine visit to go over MRI results.    Review of Systems:    Constitution:   Denies chills, fever, and sweats.  HENT:   Denies headaches or blurry vision.  Cardiovascular:  Denies chest pain or irregular heart beat.  Respiratory:   Denies cough or shortness of breath.  Gastrointestinal:  Denies abdominal pain, nausea, or vomiting.  Musculoskeletal:   Denies muscle cramps.  Neurological:   Denies dizziness or focal weakness.  Psychiatric/Behavior: Normal mental status.  Hematology/Lymph:  Denies bleeding problem or easy bruising/bleeding.  Skin:    Denies rash or suspicious lesions.    Examination:    Vital Signs:  There were no vitals filed for this visit.    There is no height or weight on file to calculate BMI.    Constitution:   Well-developed, well nourished patient in no acute distress.  Neurological:   Alert and oriented x 3 and cooperative to examination.     Psychiatric/Behavior: Normal mental status.  Respiratory:   No shortness of breath.non labored breathing.  Cardiovascular: Regular rate and rhythm  Eyes:    Extraoccular muscles intact  Skin:    No scars, rash or suspicious lesions.    Physical Exam:  Examination is unchanged per patient    MRIs consistent with multiple levels of operative and degenerative changes with mild foraminal and central canal  stenosis at multiple levels.  The area on my review that appears to have the the worst stenosis is at L3-4.  I do not believe this is severe enough from an operative standpoint but I do recommend she see a spine surgeon.  I think there may be a portion of this as an intrinsic neuropathy as well.            Assessment: Lumbar radiculopathy    Degeneration of intervertebral disc of lumbar region with discogenic back pain and lower extremity pain        Plan:  Refer her to Neurosurgery for an evaluation and treatment.    Time spent on visit was 5 minutes.    This is a telemedicine note. Patient was treated using telemedicine, real-time audio and video, according to [facility] protocols. Isidro BOYER MD conducted the visit from    location identified below. The patient participated in the visit at a non [facility] location selected by the patient(or patient's representative), identified below. I am licensed in the state where the patient stated they are located. The patient(or patient's representative) stated that they understood and accepted the privacy and security risks to the information at their location. Isidro BOYER MD was located at [facility].      DISCLAIMER: This note may have been dictated using voice recognition software and may contain grammatical errors.     NOTE: Consult report sent to referring provider via [email or web address] E[employer].

## 2025-02-17 DIAGNOSIS — M54.16 LUMBAR RADICULOPATHY: Primary | ICD-10-CM

## 2025-02-17 DIAGNOSIS — M51.362 DEGENERATION OF INTERVERTEBRAL DISC OF LUMBAR REGION WITH DISCOGENIC BACK PAIN AND LOWER EXTREMITY PAIN: ICD-10-CM

## 2025-02-25 ENCOUNTER — TELEPHONE (OUTPATIENT)
Dept: NEUROSURGERY | Facility: CLINIC | Age: 75
End: 2025-02-25
Payer: MEDICARE

## 2025-02-25 DIAGNOSIS — M54.9 BACK PAIN, UNSPECIFIED BACK LOCATION, UNSPECIFIED BACK PAIN LATERALITY, UNSPECIFIED CHRONICITY: Primary | ICD-10-CM

## 2025-02-25 NOTE — TELEPHONE ENCOUNTER
Patient referred to Dr. Barker by Dr. Barone for lumbar radiculopathy and degeneration of intervertebral disc of lumbar region with discogenic back pain and lower extremity pain.     MRI Lumbar Spine 1/14/25. Impression:  Lumbar operative and degenerative changes level by level discussed above.     Patient denies back pain. Patient complains of bilateral leg pain and numbness that has been present for 2-3 years, progressively worsening over time. Patient states she cannot walk far distances due to leg pain and numbness. Patient had LT L4-L5 TLIF with Dr. Robert Smith on 3/29/21, denies additional spine surgeries. Denies recent physical therapy. Denies additional imaging. Denies extremity weakness. Denies taking any medications to manage pain. Denies bowel or bladder incontinence.  Patient states she recently saw a nerve specialist in regards to symptoms but does not remember his name. Patient also stated she recently saw vascular surgeon Dr. Martínez Crockett.     Patient scheduled with [facility] 3/12/25, xray scheduled prior. Records requested from Dr. Crockett. Advised patient to call if she remembers the name of the nerve specialist she recently saw so we can request records.

## 2025-02-26 NOTE — PROGRESS NOTES
[facility]  History & Physical  Neurosurgery      Amita Trejo   [id number]   1950       SUBJECTIVE:     CHIEF COMPLAINT:  Bilateral lower extremity pain    HPI:  Amita Trejo is a 74 y.o. female who presents for neurosurgical evaluation at the recommendation of Dr. Barone. The patient presents today describing chronic pain to the distal lower extremities that has been ongoing for approximately 3-4 years.  These symptoms began insidiously.  The patient does have a history of a previous left L4-5 TLIF with Dr. Aidee Smith on 3/29/2021.  The patient states unfortunately this procedure did not provide her any significant relief.  The patient states with any type of prolonged standing or walking following prolonged sitting her symptoms will be severely aggravated.  She describes her pain as an aching and pressure sensation circumferentially to the distal lower extremities.  She also describes pain into her bilateral feet that can affect her balance.  She is denying any falls.  She is denying changes in bowel or bladder function.  She is denying numbness, weakness or lumbar pain at this time.  Gabapentin and Lyrica have not provided her much relief.  She has undergone extensive physical therapy over the last several years following multiple knee replacements and revisions as well as her lumbar surgery.  She underwent a nerve conduction study with Dr. 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  She had vascular evaluation with Dr. Crockett which was essentially normal.      Past Medical History:   Diagnosis Date    Acid reflux     Arthritis     chronic lymphocytic leukemia     Hypertension     Primary osteoarthritis of left hip 01/04/2024    Renal cyst     Sleep apnea     non compliant with cpap       Past Surgical History:   Procedure Laterality Date    BREAST SURGERY Left 1991?    lumpectomy, NO Restrictions    CARPAL  TUNNEL RELEASE      CATARACT EXTRACTION W/ INTRAOCULAR LENS IMPLANT Bilateral     CATARACT EXTRACTION, BILATERAL      CYST REMOVAL      EPIDURAL STEROID INJECTION      EXCISION OF THYROGLOSSAL DUCT CYST      INJECTION OF JOINT Right 10/22/2024    Procedure: Injection, Joint, Shoulder, Hip, Or Knee;  Surgeon: Isidro Barone MD;  Location: [facility] OR;  Service: Orthopedics;  Laterality: Right;  Right hip intra-articular corticosteroid injection under fluoroscopy   No sedation    JOINT REPLACEMENT  07/21    Left knee    KNEE JOINT MANIPULATION Left 08/29/2022    Procedure: MANIPULATION, KNEE;  Surgeon: Sohan Bowens MD;  Location: [facility] OR;  Service: Orthopedics;  Laterality: Left;    REVISION OF KNEE ARTHROPLASTY Left 06/08/2022    Procedure: REVISION, ARTHROPLASTY, KNEE;  Surgeon: Sohan Bowens MD;  Location: [facility] OR;  Service: Orthopedics;  Laterality: Left;  FEMUR / PATHOLOGY / ASHLEY    ROBOTIC ARTHROPLASTY, HIP Left 01/04/2024    Procedure: ROBOTIC ARTHROPLASTY,HIP;  Surgeon: Isidro Barone MD;  Location: [facility] OR;  Service: Orthopedics;  Laterality: Left;    ROBOTIC ARTHROPLASTY, KNEE Right 08/17/2023    Procedure: ROBOTIC ARTHROPLASTY, KNEE, TOTAL;  Surgeon: Isidro Barone MD;  Location: [facility] OR;  Service: Orthopedics;  Laterality: Right;    SHOULDER SURGERY Right     SINUS SURGERY      x 2    SPINE SURGERY  05/21    Bulging disc in lower back    TONSILLECTOMY         Family History   Problem Relation Name Age of Onset    Hypertension Mother Carly     COPD Mother Carly     Hearing loss Mother Carly     Hypertension Father Kevin     COPD Father Kevin     Hearing loss Father Kevin     Breast cancer Sister Jacquelyn     Cancer Sister Jacquelyn     Diabetes Maternal Grandmother Linda Huang    Diabetes Paternal Grandmother Reina        Social History     Socioeconomic History    Marital status:    Tobacco Use    Smoking status: Former     Current packs/day: 0.00     Average packs/day: 1  pack/day for 10.0 years (10.0 ttl pk-yrs)     Types: Cigarettes     Start date: 3/18/1976     Quit date: 3/18/1986     Years since quittin.0    Smokeless tobacco: Never   Substance and Sexual Activity    Alcohol use: Not Currently    Drug use: Never    Sexual activity: Yes     Partners: Male     Birth control/protection: Post-menopausal        Review of patient's allergies indicates:   Allergen Reactions    Duloxetine      Other reaction(s): Bradycardia, Hypotension, Vomit    Lorazepam      Other reaction(s): Confusion  amnesia      Tramadol Other (See Comments)     nightmares        Current Outpatient Medications   Medication Instructions    lisdexamfetamine (VYVANSE) 70 mg, Every morning    metoprolol tartrate (LOPRESSOR) 50 mg, Daily    multivitamin with minerals tablet 1 tablet, Daily    pantoprazole (PROTONIX) 40 mg, Daily    polysaccharide iron complex 200 mg iron Cap 1 capsule, Daily    pramipexole (MIRAPEX) 1.5 mg, 2 times daily    pregabalin (LYRICA) 100 mg, Oral, 2 times daily    telmisartan-hydrochlorothiazide (MICARDIS HCT) 40-12.5 mg per tablet 1 tablet, Daily    vitamin E 100 Units, Daily          Review of Systems   Constitutional:  Negative for chills, fever and weight loss.   HENT:  Negative for congestion, hearing loss, nosebleeds and tinnitus.    Eyes:  Negative for blurred vision, double vision and photophobia.   Respiratory:  Negative for cough, shortness of breath and wheezing.    Cardiovascular:  Negative for chest pain, palpitations and leg swelling.   Gastrointestinal:  Negative for constipation, diarrhea, nausea and vomiting.   Genitourinary:  Negative for dysuria, frequency and urgency.   Musculoskeletal:  Positive for myalgias (Pain and pressure to the distal lower extremities and feet). Negative for back pain, falls and neck pain.   Skin:  Negative for itching and rash.   Neurological:  Negative for dizziness, tingling, tremors, sensory change, speech change, seizures, loss of  consciousness and headaches.   Psychiatric/Behavioral:  Negative for depression, hallucinations and memory loss. The patient is not nervous/anxious.        OBJECTIVE:     Visit Vitals  /76 (BP Location: Left arm, Patient Position: Sitting)   Pulse 66   Resp 16   Ht 5' (1.524 m)   Wt 85.7 kg (189 lb)   BMI 36.91 kg/m²        Physical Exam    General:  Pleasant, Obese, Well-groomed.    Cardiovascular:  Neck is supple    Lungs:  Breathing is quiet, non-lablored    Abdomen:  Soft, non-tender, non-distended.    Neurological:  Muscle strength against resistance:   Right Left   Deltoid (C5) 5/5 5/5   Biceps (C5/6) 5/5 5/5   Wrist Flexors (C5/6) 5/5 5/5   Triceps (C7) 5/5 5/5   Wrist extension (C7) 5/5 5/5   Finger abduction (C8) 5/5 5/5    5/5 5/5        Hip abduction 5/5 5/5   Hip adduction 5/5 5/5   Hip flexion (L2) 5/5 5/5   Knee extension (L3) 5/5 5/5   Knee flexion (L4) 5/5 5/5   Dorsiflexion (L5) 5/5 5/5   EHL (L5) 5/5 5/5   Plantar flexion (S1) 5/5 5/5   Sensation is intact to primary modalities in bilateral upper and lower extremities.    Reflexes:   Right Left   Triceps (C7) 2+ 2+   Biceps (C5) 2+ 2+   Brachioradialis (C6) 2+ 2+   Patellar (L4) 0 0   Achilles (S1) 1+ 1+   Negative Clonus, Tinel's and Carter bilaterally.  Gait is limping  Coordination is normal.  No tremor noted.    Imaging:  All pertinent neuroimaging independently reviewed. Discussed these findings in detail with the patient.    X-rays of the lumbar spine dated 3/12/2025 reveal previous fusion at L4-5 with possible lucency along the right-greater-than-left L4 pedicle screws.  Disc space narrowing noted at L3-4 and L5-S1 with slight retrolisthesis at L 3 4.  No abnormal motion on extension or flexion views.  Some degenerative changes of the posterior elements noted at L5-S1.  Posterior disc space narrowing at L3-4 does appear more severe on extension versus flexion views.    MRI of the lumbar spine dated 1/14/2025 reveals previous TLIF  at L4-5.  Multilevel disc desiccation noted.  L1-2 with bilateral mild facet hypertrophy and mild foraminal stenosis.  L2-3 with moderate bilateral facet hypertrophy increased facet fluid bilaterally, mild bilateral foraminal stenosis.  L3-4 with broad-based disc bulge, bilateral facet hypertrophy and mild ligamentum flavum thickening results in mild bilateral foraminal stenosis.  L4-5 with previous fusion.  L5-S1 with mild broad-based disc bulging, moderate bilateral facet hypertrophy and mild bilateral foraminal stenosis.    EMG of the bilateral lower extremities dated 10/9/2024 with Dr. Kent reveals severe generalized predominantly sensory peripheral neuropathy with early motor involvement especially on the left lower extremity.  Possible bilateral S1 radiculopathy secondary to age wave latency delays although this was not confirmed by needle testing.    ASSESSMENT:       ICD-10-CM ICD-9-CM   1. Lumbar radiculopathy  M54.16 724.4   2. Degeneration of intervertebral disc of lumbar region with discogenic back pain and lower extremity pain  M51.362 722.52   3. Sensory neuropathy  G62.9 356.9       PLAN:   1. Lumbar radiculopathy (Primary)  - Ambulatory referral/consult to Neurosurgery  - Ambulatory referral/consult to Pain Clinic; Future  - Ambulatory referral/consult to Neurology; Future  - CT Lumbar Spine Without Contrast  - pregabalin (LYRICA) 100 MG capsule; Take 1 capsule (100 mg total) by mouth 2 (two) times daily.  Dispense: 60 capsule; Refill: 6    2. Degeneration of intervertebral disc of lumbar region with discogenic back pain and lower extremity pain  - CT Lumbar Spine Without Contrast; Future  - Ambulatory referral/consult to Neurosurgery  - Ambulatory referral/consult to Pain Clinic; Future      3. Sensory neuropathy  - Ambulatory referral/consult to Neurology; Future    Amita Trejo presents today describing chronic pain and pressure to the bilateral lower extremities with pain radiating into the  bilateral feet.  I did take the time to discuss the patient's nerve conduction study, MRI and x-rays with her in clinic today.  At this time I would like to move forward with a CT scan of the lumbar spine given the possible lucency noted along the right L4 pedicle screw.  We will refer the patient on for a neurology consultation as well as a consultation regarding conservative management of her symptoms.  I did encourage her to attempt titrating her Lyrica at this time.  She was provided a prescription and advised how to do so safely.  We also discuss potential side effects and proper weaning of this medication.  She verbalizes understanding at this time.  We will have the patient return to clinic in the near future to discuss additional treatment options with Dr. Barker as well.  She was advised to notify us with any further progression in his symptoms or concerns prior to that time.    Follow up for With Imaging Prior to Appt, with Mj.      E/M Level Based On Time:   15 minutes spent on reviewing chart, which includes interpreting lab results and diagnostic tests.   30 minutes spent in the room with the patient performing a history and physical exam, counseling or educating the patient/caregiver, prescribing medications, ordering labwork/diagnostic tests, or placing referrals.   0 minutes spent collaborating plan of care with physician.   5 minutes spent documenting all relevant clinical informationin the electronic health record.     Total Time Spent: 50 minutes       BRENDAN Rodriguez    Disclaimer:  This note is prepared using voice recognition software and as such is likely to have errors despite attempts at proofreading. Please contact me for questions.

## 2025-03-12 ENCOUNTER — OFFICE VISIT (OUTPATIENT)
Dept: NEUROSURGERY | Facility: CLINIC | Age: 75
End: 2025-03-12
Payer: MEDICARE

## 2025-03-12 ENCOUNTER — HOSPITAL ENCOUNTER (OUTPATIENT)
Dept: RADIOLOGY | Facility: HOSPITAL | Age: 75
Discharge: HOME | End: 2025-03-12
Attending: STUDENT IN AN ORGANIZED HEALTH CARE EDUCATION/TRAINING PROGRAM
Payer: MEDICARE

## 2025-03-12 VITALS
HEART RATE: 66 BPM | RESPIRATION RATE: 16 BRPM | SYSTOLIC BLOOD PRESSURE: 122 MMHG | BODY MASS INDEX: 37.11 KG/M2 | WEIGHT: 189 LBS | DIASTOLIC BLOOD PRESSURE: 76 MMHG | HEIGHT: 60 IN

## 2025-03-12 DIAGNOSIS — M54.16 LUMBAR RADICULOPATHY: Primary | ICD-10-CM

## 2025-03-12 DIAGNOSIS — M54.9 BACK PAIN, UNSPECIFIED BACK LOCATION, UNSPECIFIED BACK PAIN LATERALITY, UNSPECIFIED CHRONICITY: ICD-10-CM

## 2025-03-12 DIAGNOSIS — M51.362 DEGENERATION OF INTERVERTEBRAL DISC OF LUMBAR REGION WITH DISCOGENIC BACK PAIN AND LOWER EXTREMITY PAIN: ICD-10-CM

## 2025-03-12 DIAGNOSIS — G62.9 SENSORY NEUROPATHY: ICD-10-CM

## 2025-03-12 PROCEDURE — 72114 X-RAY EXAM L-S SPINE BENDING: CPT | Mod: TC

## 2025-03-12 PROCEDURE — 99204 OFFICE O/P NEW MOD 45 MIN: CPT | Mod: ,,, | Performed by: NURSE PRACTITIONER

## 2025-03-12 RX ORDER — PREGABALIN 100 MG/1
100 CAPSULE ORAL 2 TIMES DAILY
Qty: 60 CAPSULE | Refills: 6 | Status: SHIPPED | OUTPATIENT
Start: 2025-03-12 | End: 2025-03-12 | Stop reason: ALTCHOICE

## 2025-03-12 RX ORDER — CHOLECALCIFEROL (VITAMIN D3) 25 MCG
1000 TABLET ORAL DAILY
COMMUNITY
End: 2025-03-12

## 2025-03-12 RX ORDER — ALBUTEROL SULFATE 90 UG/1
INHALANT RESPIRATORY (INHALATION)
COMMUNITY
End: 2025-03-12

## 2025-03-12 RX ORDER — LISDEXAMFETAMINE DIMESYLATE 70 MG/1
70 CAPSULE ORAL EVERY MORNING
COMMUNITY

## 2025-03-12 RX ORDER — PREGABALIN 100 MG/1
100 CAPSULE ORAL 2 TIMES DAILY
Qty: 60 CAPSULE | Refills: 2 | Status: SHIPPED | OUTPATIENT
Start: 2025-03-12 | End: 2025-09-10

## 2025-03-12 RX ORDER — AMLODIPINE BESYLATE 2.5 MG/1
2.5 TABLET ORAL DAILY
COMMUNITY
Start: 2023-12-15 | End: 2025-03-12

## 2025-03-14 ENCOUNTER — OFFICE VISIT (OUTPATIENT)
Dept: HEMATOLOGY/ONCOLOGY | Facility: CLINIC | Age: 75
End: 2025-03-14
Payer: MEDICARE

## 2025-03-14 VITALS — BODY MASS INDEX: 36.32 KG/M2 | HEIGHT: 60 IN | WEIGHT: 185 LBS

## 2025-03-14 DIAGNOSIS — C83.00 LYMPHOMA, SMALL LYMPHOCYTIC: Primary | ICD-10-CM

## 2025-03-14 NOTE — PROGRESS NOTES
Audio Only Telehealth Visit     The patient location is: Home  The chief complaint leading to consultation is: SLL  Visit type: Virtual visit with audio only (telephone)  Total time spent in medical discussion with patient: 10 minutes  Total time spent on date of the encounter:10 minutes       The reason for the audio only service rather than synchronous audio and video virtual visit was related to technical difficulties or patient preference/necessity.       Each patient to whom I provide medical services by telemedicine is:  (1) informed of the relationship between the physician and patient and the respective role of any other health care provider with respect to management of the patient; and (2) notified that they may decline to receive medical services by telemedicine and may withdraw from such care at any time. Patient verbally consented to receive this service via voice-only telephone call.       Subjective:       Patient ID: Amita Trejo is a 74 y.o. female.    Chief Complaint: Results      History of Present Illness  Chief complaint: SLL Lugano II    HPI: 70 y/o F w/ PMHx of ELI on CPAP, mitral valve prolapse, HTN, ADHD, neuropathy, DJD referred to [facility] for newly diagnosed stage II SLL    She had routine screening MMG 2/2021 that showed some mildly enlarged b/l axillary nodes. She had b/l axillary US with Dr Lugo 3/2021 most suggestive of inflammation. Repeat US of right axilla obtained 6/2021 that showed some borderline enlarged nodes with benign sonographic appearance. She had right axillary LN biopsy showing SLL/CLL    Colonoscopy Dr Kwan 2016, negative per pt      Interval history      Today, 03/14/25, patient denies any acute concerns today. Patient continues doing well. She denies any fevers, chills, drenching night sweats, decreased appetite or weight loss. She has regular follow-up with nephrology.     Past Medical History:   Diagnosis Date    Acid reflux     Arthritis     chronic  lymphocytic leukemia     Hypertension     Primary osteoarthritis of left hip 01/04/2024    Renal cyst     Sleep apnea     non compliant with cpap       Past Surgical History:   Procedure Laterality Date    BREAST SURGERY Left 1991?    lumpectomy, NO Restrictions    CARPAL TUNNEL RELEASE      CATARACT EXTRACTION W/ INTRAOCULAR LENS IMPLANT Bilateral     CATARACT EXTRACTION, BILATERAL      CYST REMOVAL      EPIDURAL STEROID INJECTION      EXCISION OF THYROGLOSSAL DUCT CYST      INJECTION OF JOINT Right 10/22/2024    Procedure: Injection, Joint, Shoulder, Hip, Or Knee;  Surgeon: Isidro Barone MD;  Location: [facility] OR;  Service: Orthopedics;  Laterality: Right;  Right hip intra-articular corticosteroid injection under fluoroscopy   No sedation    JOINT REPLACEMENT  07/21    Left knee    KNEE JOINT MANIPULATION Left 08/29/2022    Procedure: MANIPULATION, KNEE;  Surgeon: Sohan Bowens MD;  Location: [facility] OR;  Service: Orthopedics;  Laterality: Left;    REVISION OF KNEE ARTHROPLASTY Left 06/08/2022    Procedure: REVISION, ARTHROPLASTY, KNEE;  Surgeon: Sohan Bowens MD;  Location: [facility] OR;  Service: Orthopedics;  Laterality: Left;  FEMUR / PATHOLOGY / ASHLEY    ROBOTIC ARTHROPLASTY, HIP Left 01/04/2024    Procedure: ROBOTIC ARTHROPLASTY,HIP;  Surgeon: Isidro Barone MD;  Location: [facility] OR;  Service: Orthopedics;  Laterality: Left;    ROBOTIC ARTHROPLASTY, KNEE Right 08/17/2023    Procedure: ROBOTIC ARTHROPLASTY, KNEE, TOTAL;  Surgeon: Isidro Barone MD;  Location: [facility] OR;  Service: Orthopedics;  Laterality: Right;    SHOULDER SURGERY Right     SINUS SURGERY      x 2    SPINE SURGERY  05/21    Bulging disc in lower back    TONSILLECTOMY         Family History   Problem Relation Name Age of Onset    Hypertension Mother Carly     COPD Mother Carly     Hearing loss Mother Carly     Hypertension Father Kevin     COPD Father Kevin     Hearing loss Father Kevin     Breast cancer Sister Jacquelyn     Cancer Sister Jacquelyn      Diabetes Maternal Grandmother Linda Huang    Diabetes Paternal Grandmother Reina        Social History     Socioeconomic History    Marital status:    Tobacco Use    Smoking status: Former     Current packs/day: 0.00     Average packs/day: 1 pack/day for 10.0 years (10.0 ttl pk-yrs)     Types: Cigarettes     Start date: 3/18/1976     Quit date: 3/18/1986     Years since quittin.0    Smokeless tobacco: Never   Substance and Sexual Activity    Alcohol use: Not Currently    Drug use: Never    Sexual activity: Yes     Partners: Male     Birth control/protection: Post-menopausal       Current Outpatient Medications   Medication Sig Dispense Refill    lisdexamfetamine (VYVANSE) 70 MG capsule Take 70 mg by mouth every morning.      metoprolol tartrate (LOPRESSOR) 50 MG tablet Take 50 mg by mouth once daily at 6am.      multivitamin with minerals tablet Take 1 tablet by mouth once daily.      pantoprazole (PROTONIX) 40 MG tablet 40 mg once daily.      polysaccharide iron complex 200 mg iron Cap Take 1 capsule by mouth Daily.      pramipexole (MIRAPEX) 1.5 MG tablet Take 1.5 mg by mouth 2 (two) times a day.      pregabalin (LYRICA) 100 MG capsule Take 1 capsule (100 mg total) by mouth 2 (two) times daily. 60 capsule 2    telmisartan-hydrochlorothiazide (MICARDIS HCT) 40-12.5 mg per tablet Take 1 tablet by mouth once daily.      vitamin E 100 UNIT capsule Take 100 Units by mouth once daily.       No current facility-administered medications for this visit.     Facility-Administered Medications Ordered in Other Visits   Medication Dose Route Frequency Provider Last Rate Last Admin    vancomycin (VANCOCIN) 1,000 mg in dextrose 5 % (D5W) 250 mL IVPB (Vial-Mate)  15 mg/kg Intravenous On Call Procedure Isidro Barone .7 mL/hr at 24 0848 1,000 mg at 24 1035       Review of patient's allergies indicates:   Allergen Reactions    Duloxetine      Other reaction(s): Bradycardia,  Hypotension, Vomit    Lorazepam      Other reaction(s): Confusion  amnesia      Tramadol Other (See Comments)     nightmares         Labs:    03/06/25: CMP unremarkable, , uric acid 5.2   11/10/24: CMP unremarkable, wbc 14.32, hgb 12.6, plt 171, ANC 4.79, ALC 8.72  06/14/24: CMP unremarkable, , wbc 9.39, hgb 11.7, plt 174, ANC 2.57, ALC 6.26  02/07/2024 creatinine 0.92, albumin 4.0, LFTs unremarkable, uric acid 6.4, , WBC count 12.5, hemoglobin 10.8, MCV 92.8, platelet count 169, ANC 2.52.    04/25/2023: CMP unremarkable, WBC count 14.9, hemoglobin 12.1, MCV 94.7, ANC 3.86.    03/08/2023:  WBC count 24.2, hemoglobin 13.3, MCV 94, platelet count 259.  7/6/22 Cr 0.88, Alb 3.8, TP 7.1, , Uric acid 5.5, WBC 10.61, Hgb 10.5, , ANC 2.90, ALC 6.90  3/3/22 WBC 13.5 RBC 3.74 Hg 11.2 MCV 94 rdw 13.8  ANC 3.4 ALC 8.9 Cr 1.12 Alb 4.4 Ca 9.4   12/8/21 Cr 0.86, Alb 3.8, TP 7.0, Ca 9.7, AlkPhos 103, , Uric acid 5.9, WBC 13.21, Hgb 11.4, , Retic 1.43, ANC 4.41, ALC 7.73  9/21/21 Cr 1.16 Alb 3.9 TP 7.4 Ca 9.9 AlkPhos 92 AST 21 ALT 12  ferritin 122 WBC 9.82 Hg 11.1  ANC 3.35 ALC 4.91  7/21/21  Ferritin 138 Hep B core ab neg Hep B s ag neg Hep B s ab neg Hep C neg Uric acid 6.7 WBC 8.78 Hg 12.2  ANC 2.78 ALC 5.14 Direct Joao neg B2 micro 4.2 SPEP w/ ANGIE IgG 930 IgA 467 IgM 43 Abs kappa 43 Abs lambda 27.8 SFLC ratio 1.55 no M spike  1/18/20 Cr 0.89 Alb 3.9 TP 7.1 WBC 8.46 Hg 12.9 MCV 90.9  ANC 3.18 ALC 4.25    Imaging:  3/29/22 MMG prominent nodes in each axilla. Some nodes have minimally increased in size.     6/14/21 US axilla unilateral: 6 right axillary nodes, 2 are borderline enlarged measuring 2.4x1.7x1.3cm and 2.3x1.3x1.1cm. Benign sonographic appearance    2/18/21 CT N/C/A/P w/ contrast: no cervical adenopathy. Small thyroid nodules. No acute pulm disease. Small focus of nodular pleural thickening in fissure of left lung stable  since 2019. Increased size of several borderline enlarged axillary LNs b/l in comparison to 2019 although this finding is nonspecific. Simple renal cysts on both kidneys increased in size compared to 2018 but not suspicious for malignancy    2/5/21 screening MMG: [id number]. Small LN in upper and outer quadrant of left breast increased slightly in size from prior exams. Multiple LN within each axilla larger and more dense than on prior exam. Lymphoma should be considered    Path:  6/16/21 right axillary LN biopsy: atypical lymphoproliferative process consistent with involvement by CLL/SLL.  Flow: abnormal B cells 82% of lymphs, CD19+, CD20+ dim, CD5+, CD23+ variable, CD11c partial+, CD38-, lambda +dim  FISH: del13q/-13 detected. del17p, t(11:14), trisomy 12, del11q and del6q negative.    Review of Systems  CONSTITUTIONAL: no fevers, no chills,no weight loss, no fatigue, no weakness  HEMATOLOGIC: no abnormal bleeding, no abnormal bruising, no drenching night sweats  ONCOLOGIC: no new masses or lumps  HEENT: no vision loss, no tinnitus or hearing loss, no nose bleeding, no dysphagia, no odynophagia  CVS: no chest pain, no palpitations, no dyspnea on exertion  RESP: no shortness of breath, no hemoptysis, no cough  BREAST: no nipple discharge, no breast tenderness, no breast masses on self breast examination  GI: no nausea, no vomiting, no diarrhea, no constipation, no melena, no hematochezia, no hematemesis, no abdominal pain, no increase in abdominal girth  : no dysuria, no hematuria, no discharge  GYN: no abnormal vaginal bleeding, no dyspareunia, no vaginal discharge  INTEGUMENT: no rashes, no abnormal bruising, no nail pitting, no hyperpigmentation  NEURO: no falls, no memory loss, no paresthesias or dysesthesias, no urofecal incontinence or retention, no loss of strength on any extremity  MSK: +chronic lower back pain, no new joint pain but chronic b/l knee pain, Right knee replacement on 8/17/2023 with right knee  swelling.  PSYCH: no suicidal or homicidal ideation, no depression, no insomnia, no anhedonia  ENDOCRINE: no heat or cold intolerance, no polyuria, no polydipsia        Objective:      Physical Exam  There were no vitals filed for this visit.            ECOG PS 1  GA: AAOx3, NAD  HEENT: NCAT, , EOMI, moist oral mucous membrane  LYMPH: no cervical, inguinal or supraclavicular adenopathy. Small b/l axillary LNs, largest about 2cm in right axilla, others around 1cm  CVS: s1s2 RRR, no M/R/G  RESP: CTA b/l, no crackles, no wheezes or rhonchi  ABD: soft, NT, ND, BS+, no hepatosplenomegaly  EXT: no deformities, b/l pitting pedal edema  SKIN: no rashes, no bruises or purpura, warm and dry  NEURO: normal mentation, strength, no sensory deficits    Assessment and Plan       Problem List Items Addressed This Visit    None          # Lymphoma, small lymphocytic    Findings are most consistent with SLL Lugano stage II vs CLL Juarez I. She does likely have peripheral blood involvement as well  In any case, no anemia, no thrombocytopenia, no B symptoms, only 2 narciso stations involved (b/l axilla) and 13q del which confers good prognosis. No IGHV available but I do not think this is necessary at this time  Had systemic imaging with CT N/C/A/P w/ IV contrast 2/2021 without any bulky disease  No further workup needed at this time. Will observe every 12-16 weeks. If any indication arises, then she will also need PET CT and bone marrow biopsy    Patient had a robotic assisted left total hip arthroplasty done on 01/04/2024  Plan   Reviewed labs and noted slightly elevated WBC 17.22 with normal hgb/plt  Follow-up in 4 months with above workup to discuss further recommendations.  Patient was advised to call our clinic if she were to have constitutional symptoms                             This service was not originating from a related E/M service provided within the previous 7 days nor will  to an E/M service or procedure within the  next 24 hours or my soonest available appointment.  Prevailing standard of care was able to be met in this audio-only visit.      {

## 2025-03-21 ENCOUNTER — OFFICE VISIT (OUTPATIENT)
Facility: CLINIC | Age: 75
End: 2025-03-21
Payer: MEDICARE

## 2025-03-21 VITALS
HEART RATE: 64 BPM | BODY MASS INDEX: 36.31 KG/M2 | DIASTOLIC BLOOD PRESSURE: 61 MMHG | HEIGHT: 60 IN | SYSTOLIC BLOOD PRESSURE: 129 MMHG | WEIGHT: 184.94 LBS

## 2025-03-21 DIAGNOSIS — M54.16 LUMBAR RADICULOPATHY: Primary | ICD-10-CM

## 2025-03-21 DIAGNOSIS — M51.362 DEGENERATION OF INTERVERTEBRAL DISC OF LUMBAR REGION WITH DISCOGENIC BACK PAIN AND LOWER EXTREMITY PAIN: ICD-10-CM

## 2025-03-21 RX ORDER — AMOXICILLIN 500 MG/1
1000 CAPSULE ORAL 2 TIMES DAILY
COMMUNITY
Start: 2025-03-13 | End: 2025-03-21

## 2025-03-21 NOTE — PROGRESS NOTES
[facility]    Subjective:     Chief Complaint: Back Pain (Referred by Viktoriya BHAT, had surgery in the past, no prior lumbar injections, no recent  PT,  no blood thinner or ASA, lyrica for relief, pain level 4/10)    Referred by: Viktoriya Marc FNP     History of Present Illness: Amita Trejo is a 74 y.o. female presents today as a new consult from nurse Neurosurgery nurse practitioner, Viktoriya Marc to evaluate and treat lumbar radiculopathy and degeneration of intervertebral disc of lumbar region with discogenic back pain and lower extremity pain.  Patient relayed that her pain started 3-5 years ago spontaneously.  Her primary pain is located to her lower extremities and not her back.  She reports she feels almost like a numbness or lack of sensation circling both lower extremities.  She also has intense numbness to her bilateral feet and bilateral shins and posterior lateral legs (lower extremity legs).  She reports she does not have diabetes mellitus as as noted in her chart in his never had peripheral neuropathy.  Also asked her if she had tried chemotherapy with CLL in his was negative.  She completed a EMG and nerve conduction study to bilateral lower extremities that showed normal findings in October of 2024.  This is located in media manager.  MRI of her lumbar spine also shows very benign findings that do not clearly note the etiology.  She has also been evaluated by a vein specialist that showed normal findings.  Patient also relayed that her pain remained post her spinal surgery in 2021 which included left L4-5 TLIF with Dr. Aidee Smith on 3/29 2021.  She has tried gabapentin she has tried 3 years of physical therapy to no avail reducing this numbness sensation and balance issues.  She currently takes Lyrica 100 mg by mouth twice a day and Tylenol rarely.  She has completed dry needling.  None of these treatments have provided her moderate or even mild sustainable pain relief.   Her pain will elevate if she sits too long and stands up she can barely move.  She states this will elevate her pain score to a 8/10 on the NRS.  Her pain score today as a 4/10 on the NRS.  Her pain will reduce temporarily when sitting down and maybe subtly reduce with Lyrica.  She underwent a nerve conduction study with Dr. 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  She had vascular evaluation with Dr. Crockett which was essentially normal. She has undergone extensive physical therapy over the last several years following multiple knee replacements and revisions as well as her lumbar surgery.  Patient has no history peripheral neuropathy    Pertinent PMH:  Acid reflux,  CKD 3, MVP,  CLL (lymphoma), hypertension, primary osteoarthritis left hip, renal cyst, sleep apnea (noncompliant with CPAP), ADHD, diastolic dysfunction, Pertinent PSH: left L4-5 TLIF with Dr. Aidee Smith on 3/29/2021., right shoulder surgery, sinus surgery X 2, right knee arthroplasty, left knee replacement (X 2),        Vital signs:   Visit Vitals  /61 (BP Location: Left arm, Patient Position: Sitting)   Pulse 64   Ht 5' (1.524 m)   Wt 83.9 kg (184 lb 15.5 oz)   BMI 36.12 kg/m²      Vitals:    03/21/25 0853   PainSc:   4     Pain Disability Index (PDI): 32       Interventional Pain History  2020 prior for lumbar epidural steroid injections for surgical plan  left L4-5 TLIF with Dr. Aidee Smith on 3/29/2021      ROS:  Bilateral lower extremity numbness    MRI lumbar spine 2025:  FINDINGS:  There are posterior lumbar fusions with transpedicular screws and benito constructs at L4-L5.  There are also L4-L5 disc space operative changes.  Thoracolumbar Schmorl node defects.  Otherwise, lumbar vertebrae stature is preserved and there is no significant listhesis.  There are no acute marrow edematous signals.  No apparent paraspinal soft tissue inflammations.  Bilateral  kidneys are remarkable multiple hyperintense cystic structures..  The visualized thoracic cord is unremarkable. The conus medullaris terminates at L1.  Disc segmental analysis is given below:     L1-L2 level is unremarkable.     At L2-L3, disc is unremarkable.  Bilateral facet arthropathy without significant central canal stenosis.  Bilateral neural foramen are patent.     At L3-L4, there is disc bulge which mildly flattens the ventral thecal sac.  Bilateral facet arthropathy and some ligamentum flavum thickening.  These findings result in minimal central canal stenosis.  There are no significant narrowings of the neural foramen.     At L4-L5, thecal sac is patent.  There are no significant narrowings of the neural foramen.     At L5-S1, disc is unremarkable.  Bilateral mild facets arthropathy.  Central canal is not stenosed.  Bilateral neural foramen are patent.     Impression:     Lumbar operative and degenerative changes level by level discussed above.        Electronically signed by:González Back  Date:                                            01/14/2025  Time:                                           10:06    Objective:        Physical Exam  General: Well developed; overweight; A&O x 3; No anxiety/depression; NAD  Mental Status: Oriented to person, palce and time. Displays appropriate mood & affect.  Head: Norm cephalic and atraumatic  Neck:  No cervical paraspinal banding.  Full range of motion with lateral turning and cervical flexion +extension.  Eyes: normal conjunctiva, normal lids, normal pupils  ENT and mouth: normal external ear, nose, and no lesions noted on the lips.  Respiratory: Symmetrical, Unlabored. No dyspnea  CV: normal rhythm and rate. No peripheral edema.   Abdomen: Non-distended    Extremities:  Gen: No cyanosis or tenderness to palpation bilateral upper and lower extremities  Skin: Warm, pink, dry, no rashes, no lesions on the lumbar spine  Strength: 5/5 motor strength bilateral upper and  lower extremities  ROM: Full ROM in bilateral knees and ankles without pain or instability.    Neuro:  Gait: no altalgic lean, normal toe and heel raise. Independent ambulator.  DTR's: 2+ in bilateral patellar, and ankle  Sensory: Intact to light touch bilateral  upper and lower extremities    Spine: Normal lordosis. No scoliosis  L-spine ROM: Full ROM to flexion, extension, bilateral rotation,   Straight Leg Raise:  Positive right, positive left  SI Joint: No tenderness to palpation bilaterally.      Assessment:     Amita Trejo is a 74 y.o. female presents today as a new consult from nurse Neurosurgery nurse practitioner, Viktoriya Marc to evaluate and treat lumbar radiculopathy and degeneration of intervertebral disc of lumbar region with discogenic back pain and lower extremity pain.  Patient relayed that her pain started 3-5 years ago spontaneously.  Her primary pain is located to her lower extremities and not her back.  She reports she feels almost like a numbness or lack of sensation circling both lower extremities.  She also has intense numbness to her bilateral feet and bilateral shins and posterior lateral legs (lower extremity legs).  She reports she does not have diabetes mellitus as as noted in her chart in his never had peripheral neuropathy.  Also asked her if she had tried chemotherapy with CLL in his was negative.  She completed a EMG and nerve conduction study to bilateral lower extremities that showed normal findings in October of 2024.  This is located in media manager.  MRI of her lumbar spine also shows very benign findings that do not clearly note the etiology.  She has also been evaluated by a vein specialist that showed normal findings.  Patient also relayed that her pain remained post her spinal surgery in 2021 which included left L4-5 TLIF with Dr. Aidee Smith on 3/29 2021.  She has tried gabapentin she has tried 3 years of physical therapy to no avail reducing this numbness  sensation and balance issues.  She currently takes Lyrica 100 mg by mouth twice a day and Tylenol rarely.  She has completed dry needling.  None of these treatments have provided her moderate or even mild sustainable pain relief.  Her pain will elevate if she sits too long and stands up she can barely move.  She states this will elevate her pain score to a 8/10 on the NRS.  Her pain score today as a 4/10 on the NRS.  Her pain will reduce temporarily when sitting down and maybe subtly reduce with Lyrica.  She underwent a nerve conduction study with Dr. 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  She had vascular evaluation with Dr. Crockett which was essentially normal. She has undergone extensive physical therapy over the last several years following multiple knee replacements and revisions as well as her lumbar surgery.  Patient has no history peripheral neuropathy        Encounter Diagnoses   Name Primary?    Lumbar radiculopathy Yes    Degeneration of intervertebral disc of lumbar region with discogenic back pain and lower extremity pain          Plan:     Message sent to Dr. Duckworth for her opinion of etiology as I do not see a cause of this in her MRI lumbar spine, her bilateral lower extremity EMG/NCV results were normal.  Additionally, her results of her vein specialist for her lower extremities were normal.  Patient has tried 3 years +of physical therapy to no avail as well as dry needling.  She was not interested in repeating this.  Addendum:  NCV + EMG bilateral lower extremities showed significant peripheral neuropathy.  This is likely the cause of her pain and she needs an neurology consult.  Patient was called.  She was unavailable and I left a voicemail for her to call our staff back as she needs a referral to Neurology to treat this.        Amita was seen today for back pain.    Diagnoses and all orders for this  visit:    Lumbar radiculopathy  -     Ambulatory referral/consult to Pain Clinic    Degeneration of intervertebral disc of lumbar region with discogenic back pain and lower extremity pain  -     Ambulatory referral/consult to Pain Clinic           Past Medical History:   Diagnosis Date    Acid reflux     Arthritis     chronic lymphocytic leukemia     Hypertension     Primary osteoarthritis of left hip 01/04/2024    Renal cyst     Sleep apnea     non compliant with cpap       Past Surgical History:   Procedure Laterality Date    BREAST SURGERY Left 1991?    lumpectomy, NO Restrictions    CARPAL TUNNEL RELEASE      CATARACT EXTRACTION W/ INTRAOCULAR LENS IMPLANT Bilateral     CATARACT EXTRACTION, BILATERAL      CYST REMOVAL      EPIDURAL STEROID INJECTION      EXCISION OF THYROGLOSSAL DUCT CYST      INJECTION OF JOINT Right 10/22/2024    Procedure: Injection, Joint, Shoulder, Hip, Or Knee;  Surgeon: Isidro Barone MD;  Location: [facility] OR;  Service: Orthopedics;  Laterality: Right;  Right hip intra-articular corticosteroid injection under fluoroscopy   No sedation    JOINT REPLACEMENT  07/21    Left knee    KNEE JOINT MANIPULATION Left 08/29/2022    Procedure: MANIPULATION, KNEE;  Surgeon: Sohan Bowens MD;  Location: [facility] OR;  Service: Orthopedics;  Laterality: Left;    REVISION OF KNEE ARTHROPLASTY Left 06/08/2022    Procedure: REVISION, ARTHROPLASTY, KNEE;  Surgeon: Sohan Bowens MD;  Location: [facility] OR;  Service: Orthopedics;  Laterality: Left;  FEMUR / PATHOLOGY / ASHLEY    ROBOTIC ARTHROPLASTY, HIP Left 01/04/2024    Procedure: ROBOTIC ARTHROPLASTY,HIP;  Surgeon: Isidro Barone MD;  Location: [facility] OR;  Service: Orthopedics;  Laterality: Left;    ROBOTIC ARTHROPLASTY, KNEE Right 08/17/2023    Procedure: ROBOTIC ARTHROPLASTY, KNEE, TOTAL;  Surgeon: Isidro Barone MD;  Location: [facility] OR;  Service: Orthopedics;  Laterality: Right;    SHOULDER SURGERY Right     SINUS SURGERY      x 2    SPINE SURGERY  05/21     Bulging disc in lower back    TONSILLECTOMY         Family History   Problem Relation Name Age of Onset    Hypertension Mother Carly     COPD Mother Carly     Hearing loss Mother Carly     Hypertension Father Kevin     COPD Father Kevin     Hearing loss Father Kevin     Breast cancer Sister Jacquelyn     Cancer Sister Jacquelyn     Diabetes Maternal Grandmother Linda Huang    Diabetes Paternal Grandmother Reina        Social History     Socioeconomic History    Marital status:    Tobacco Use    Smoking status: Former     Current packs/day: 0.00     Average packs/day: 1 pack/day for 10.0 years (10.0 ttl pk-yrs)     Types: Cigarettes     Start date: 3/18/1976     Quit date: 3/18/1986     Years since quittin.0    Smokeless tobacco: Never   Substance and Sexual Activity    Alcohol use: Not Currently    Drug use: Never    Sexual activity: Yes     Partners: Male     Birth control/protection: Post-menopausal       Current Medications[1]    Review of patient's allergies indicates:   Allergen Reactions    Duloxetine      Other reaction(s): Bradycardia, Hypotension, Vomit    Lorazepam      Other reaction(s): Confusion  amnesia      Tramadol Other (See Comments)     nightmares                  [1]   Current Outpatient Medications   Medication Sig Dispense Refill    amoxicillin (AMOXIL) 500 MG capsule Take 1,000 mg by mouth 2 (two) times daily.      lisdexamfetamine (VYVANSE) 70 MG capsule Take 70 mg by mouth every morning.      metoprolol tartrate (LOPRESSOR) 50 MG tablet Take 50 mg by mouth once daily at 6am.      multivitamin with minerals tablet Take 1 tablet by mouth once daily.      pantoprazole (PROTONIX) 40 MG tablet 40 mg once daily.      polysaccharide iron complex 200 mg iron Cap Take 1 capsule by mouth Daily.      pramipexole (MIRAPEX) 1.5 MG tablet Take 1.5 mg by mouth 2 (two) times a day.      pregabalin (LYRICA) 100 MG capsule Take 1 capsule (100 mg total) by mouth 2 (two) times daily. 60  capsule 2    telmisartan-hydrochlorothiazide (MICARDIS HCT) 40-12.5 mg per tablet Take 1 tablet by mouth once daily.      vitamin E 100 UNIT capsule Take 100 Units by mouth once daily.       No current facility-administered medications for this visit.     Facility-Administered Medications Ordered in Other Visits   Medication Dose Route Frequency Provider Last Rate Last Admin    vancomycin (VANCOCIN) 1,000 mg in dextrose 5 % (D5W) 250 mL IVPB (Vial-Mate)  15 mg/kg Intravenous On Call Procedure Isidro Barone .7 mL/hr at 01/04/24 0848 1,000 mg at 01/04/24 1035

## 2025-03-25 ENCOUNTER — TELEPHONE (OUTPATIENT)
Facility: CLINIC | Age: 75
End: 2025-03-25
Payer: MEDICARE

## 2025-03-25 NOTE — TELEPHONE ENCOUNTER
----- Message from Cayetano Brunner NP sent at 3/25/2025  1:29 PM CDT -----  Regarding: RE: Follow-up cause of her numbness and discomfort to her lower extremities identified in EMG/NCV results that she completed in the past  Please confirm if patient will follow-up with her neurologist Dr. Jimenez to evaluate her peripheral neuropathy pain and treatment options.  If Dr. Jimenez is agreeable to evaluate this in clinic her neurology consult is to be canceled  ----- Message -----  From: Joselyn Proctor MA  Sent: 3/25/2025   1:16 PM CDT  To: Cayetano Brunner NP; Kannan DIAZ#  Subject: RE: Follow-up cause of her numbness and disc#    Pt has an upcoming appointment in August w/Dr Harlan Sanchez Neurologist  ----- Message -----  From: Cayetano Brunner NP  Sent: 3/21/2025  12:42 PM CDT  To: Kannan DIAZ Staff  Subject: Follow-up cause of her numbness and discomfo#    Please relayed to patient I would like for her to go to a neurology consult to see how to treat notable peripheral neuropathy.  This is likely the cause of her pain.  I discuss with Dr. Duckworth who confirmed this was the cause of her pain.  She needs to be seen by a neurologist to discuss treatment plans with her.  I need to know where she would like for me to send a neurology consult .  I will also need the name of her neuralgia as she would like me to refer to.

## 2025-05-05 ENCOUNTER — TELEPHONE (OUTPATIENT)
Dept: NEUROSURGERY | Facility: CLINIC | Age: 75
End: 2025-05-05
Payer: MEDICARE

## 2025-05-05 NOTE — TELEPHONE ENCOUNTER
I returned patient's call to reschedule appointment with Dr. Barker on 5/7/25. Patient stated the weather will be bad and she is coming from [place] and not sure if it's going to rain. I rescheduled patient with Dr. Barker on 6/24 at 11:30. I also informed patient that I will put her on the wait list if something comes up sooner. Patient verbalized understanding of appointment date and time.

## 2025-06-09 NOTE — PROGRESS NOTES
The patient is status post right total knee arthroplasty postoperative day number 1  Resting in bed, pain controlled  No issues reported by nursing overnight   Patient ambulated well with therapy yesterday    Vital Signs  Temp: 98.3 °F (36.8 °C)  Temp Source: Oral  Pulse: 101  Heart Rate Source: Monitor  Resp: 18  SpO2: 99 %  Pulse Oximetry Type: Intermittent  Flow (L/min): 10  Oxygen Concentration (%): 80  Device (Oxygen Therapy): room air  BP: 132/68  BP Location: Right arm  BP Method: Automatic  Patient Position: Lying  Height and Weight  Height: 5' (152.4 cm)  Height Method: Stated  Weight: 75.8 kg (167 lb)  Weight Method: Standard Scale  BSA (Calculated - sq m): 1.79 sq meters  BMI (Calculated): 32.6  Weight in (lb) to have BMI = 25: 127.7]    +FHL/EHL  BCR distally  Dressing c/d/I  Thigh and calf compartments soft and compressible  SILT distally    Recent Lab Results         08/18/23  0418   08/17/23  0952        Anion Gap 9.0         BUN 31.0         BUN/CREAT RATIO 36         Calcium 8.0         Chloride 105         CO2 24         Creatinine 0.87         eGFR >60         Glucose 101         Hematocrit 25.9   31.6       Hemoglobin 8.6   10.6       MCH 31.7         MCHC 33.2         MCV 95.6         MPV 9.7         nRBC 0.0         Platelets 128         Potassium 3.9         RBC 2.71         RDW 12.6         Sodium 138         WBC 7.39                 A/P:  Status post right total knee arthroplasty postop day 1.  Orthopedically stable  Continue with postoperative care, PT/OT  Aspirin for DVT PPx   Plan on discharge home later today with outpatient physical therapy  Follow up in 2 weeks for repeat evaluation   Include Location In Plan?: No Detail Level: Zone

## 2025-06-11 ENCOUNTER — TELEPHONE (OUTPATIENT)
Dept: HEMATOLOGY/ONCOLOGY | Facility: CLINIC | Age: 75
End: 2025-06-11
Payer: MEDICARE

## 2025-06-11 NOTE — TELEPHONE ENCOUNTER
Pt reports abnormal mammogram and was instructed by Dr. Verma to notify oncologist. Report scanned in [facility].--SC, LPN

## 2025-06-25 ENCOUNTER — OFFICE VISIT (OUTPATIENT)
Dept: NEUROSURGERY | Facility: CLINIC | Age: 75
End: 2025-06-25
Payer: MEDICARE

## 2025-06-25 VITALS
RESPIRATION RATE: 16 BRPM | DIASTOLIC BLOOD PRESSURE: 77 MMHG | HEART RATE: 69 BPM | HEIGHT: 60 IN | BODY MASS INDEX: 38.8 KG/M2 | WEIGHT: 197.63 LBS | SYSTOLIC BLOOD PRESSURE: 126 MMHG

## 2025-06-25 DIAGNOSIS — M54.16 LUMBAR RADICULOPATHY: Primary | ICD-10-CM

## 2025-06-25 DIAGNOSIS — M51.362 DEGENERATION OF INTERVERTEBRAL DISC OF LUMBAR REGION WITH DISCOGENIC BACK PAIN AND LOWER EXTREMITY PAIN: ICD-10-CM

## 2025-06-25 PROCEDURE — 99214 OFFICE O/P EST MOD 30 MIN: CPT | Mod: ,,, | Performed by: STUDENT IN AN ORGANIZED HEALTH CARE EDUCATION/TRAINING PROGRAM

## 2025-06-25 RX ORDER — LEVOCETIRIZINE DIHYDROCHLORIDE 5 MG/1
5 TABLET, FILM COATED ORAL
COMMUNITY
Start: 2025-04-10

## 2025-06-25 RX ORDER — LISINOPRIL AND HYDROCHLOROTHIAZIDE 12.5; 2 MG/1; MG/1
TABLET ORAL
COMMUNITY

## 2025-06-25 RX ORDER — METHYLPREDNISOLONE 4 MG/1
TABLET ORAL
COMMUNITY
Start: 2025-04-18

## 2025-06-25 RX ORDER — PRAMIPEXOLE DIHYDROCHLORIDE 0.25 MG/1
TABLET ORAL
COMMUNITY

## 2025-06-25 RX ORDER — CHLOPHEDIANOL HCL AND PYRILAMINE MALEATE 12.5; 12.5 MG/5ML; MG/5ML
SOLUTION ORAL
COMMUNITY
Start: 2025-04-10

## 2025-06-25 RX ORDER — FLUTICASONE PROPIONATE 50 MCG
2 SPRAY, SUSPENSION (ML) NASAL
COMMUNITY

## 2025-06-25 RX ORDER — CHOLECALCIFEROL (VITAMIN D3) 25 MCG
TABLET ORAL
COMMUNITY

## 2025-06-25 RX ORDER — LISDEXAMFETAMINE DIMESYLATE 30 MG/1
CAPSULE ORAL
COMMUNITY

## 2025-06-25 RX ORDER — METOPROLOL SUCCINATE 25 MG/1
TABLET, EXTENDED RELEASE ORAL
COMMUNITY

## 2025-06-25 RX ORDER — ALPRAZOLAM 0.25 MG/1
TABLET ORAL
COMMUNITY

## 2025-06-25 RX ORDER — OFLOXACIN 3 MG/ML
SOLUTION/ DROPS OPHTHALMIC
COMMUNITY
Start: 2025-06-05

## 2025-06-25 RX ORDER — MINOXIDIL 2.5 MG/1
2.5 TABLET ORAL
COMMUNITY

## 2025-06-25 RX ORDER — DEXLANSOPRAZOLE 60 MG/1
1 CAPSULE, DELAYED RELEASE ORAL
COMMUNITY

## 2025-06-25 RX ORDER — FEXOFENADINE HCL 180 MG/1
180 TABLET ORAL
COMMUNITY
Start: 2025-04-18

## 2025-06-30 ENCOUNTER — HOSPITAL ENCOUNTER (OUTPATIENT)
Dept: RADIOLOGY | Facility: CLINIC | Age: 75
Discharge: HOME | End: 2025-06-30
Attending: PHYSICIAN ASSISTANT
Payer: MEDICARE

## 2025-06-30 ENCOUNTER — OFFICE VISIT (OUTPATIENT)
Dept: ORTHOPEDICS | Facility: CLINIC | Age: 75
End: 2025-06-30
Payer: MEDICARE

## 2025-06-30 VITALS
WEIGHT: 193.81 LBS | HEIGHT: 60 IN | SYSTOLIC BLOOD PRESSURE: 152 MMHG | DIASTOLIC BLOOD PRESSURE: 80 MMHG | BODY MASS INDEX: 38.05 KG/M2 | HEART RATE: 74 BPM

## 2025-06-30 DIAGNOSIS — M25.551 RIGHT HIP PAIN: ICD-10-CM

## 2025-06-30 DIAGNOSIS — M16.11 PRIMARY OSTEOARTHRITIS OF RIGHT HIP: Primary | ICD-10-CM

## 2025-06-30 PROCEDURE — 73502 X-RAY EXAM HIP UNI 2-3 VIEWS: CPT | Mod: RT,,, | Performed by: PHYSICIAN ASSISTANT

## 2025-06-30 PROCEDURE — 99214 OFFICE O/P EST MOD 30 MIN: CPT | Mod: ,,, | Performed by: PHYSICIAN ASSISTANT

## 2025-06-30 NOTE — PROGRESS NOTES
Chief Complaint:   Chief Complaint   Patient presents with    Right Hip - Follow-up     Ongoing right hip pain. Worse with ambulation bending. Not currently in PT. Not currently taking medication for pain. Denies prior sx.        History of present illness:      History of Present Illness    CHIEF COMPLAINT:  - Right hip pain    HPI:  Ms. Trejo presents for evaluation of right hip pain located in the groin area and radiating. She describes the pain as severe, stating that it hurts in the groin area and radiates to other areas. She reports that the pain is intense and there is no comfortable position. She received a cortisone injection in her right hip in October, which provided excellent relief for her daughter's wedding. She has not undergone any other treatments for her right hip pain. She has a history of knee and hip surgeries, including a left hip replacement about a year ago.    PREVIOUS TREATMENTS:  - Right hip corticosteroid injection: October, provided excellent relief for the patient's daughter's wedding    SURGICAL HISTORY:  - Left hip replacement: approximately 1 year ago  - Knee surgeries: timeline not specified          Past Medical History:   Diagnosis Date    Acid reflux     Arthritis     chronic lymphocytic leukemia     Hypertension     Primary osteoarthritis of left hip 01/04/2024    Renal cyst     Sleep apnea     non compliant with cpap       Past Surgical History:   Procedure Laterality Date    BREAST SURGERY Left 1991?    lumpectomy, NO Restrictions    CARPAL TUNNEL RELEASE      CATARACT EXTRACTION W/ INTRAOCULAR LENS IMPLANT Bilateral     CATARACT EXTRACTION, BILATERAL      CYST REMOVAL      EPIDURAL STEROID INJECTION      EXCISION OF THYROGLOSSAL DUCT CYST      EYE SURGERY  Cataract surgery: left eye 6/17/24 right eye 7/8/24    1    INJECTION OF JOINT Right 10/22/2024    Procedure: Injection, Joint, Shoulder, Hip, Or Knee;  Surgeon: Isidro Barone MD;  Location: [facility];  Service:  Orthopedics;  Laterality: Right;  Right hip intra-articular corticosteroid injection under fluoroscopy   No sedation    JOINT REPLACEMENT      Left knee    KNEE JOINT MANIPULATION Left 2022    Procedure: MANIPULATION, KNEE;  Surgeon: Sohan Bowens MD;  Location: [facility] OR;  Service: Orthopedics;  Laterality: Left;    REVISION OF KNEE ARTHROPLASTY Left 2022    Procedure: REVISION, ARTHROPLASTY, KNEE;  Surgeon: Sohan Bowens MD;  Location: [facility] OR;  Service: Orthopedics;  Laterality: Left;  FEMUR / PATHOLOGY / ASHLEY    ROBOTIC ARTHROPLASTY, HIP Left 2024    Procedure: ROBOTIC ARTHROPLASTY,HIP;  Surgeon: Isidro Barone MD;  Location: [facility] OR;  Service: Orthopedics;  Laterality: Left;    ROBOTIC ARTHROPLASTY, KNEE Right 2023    Procedure: ROBOTIC ARTHROPLASTY, KNEE, TOTAL;  Surgeon: Isidro Barone MD;  Location: [facility] OR;  Service: Orthopedics;  Laterality: Right;    SHOULDER SURGERY Right     SINUS SURGERY      x 2    SPINE SURGERY      Bulging disc in lower back    TONSILLECTOMY         Current Outpatient Medications   Medication Sig    dexlansoprazole (DEXILANT) 60 mg capsule 1 capsule.    fexofenadine (ALLEGRA) 180 MG tablet Take 180 mg by mouth.    levocetirizine (XYZAL) 5 MG tablet Take 5 mg by mouth.    lisdexamfetamine (VYVANSE) 70 MG capsule Take 70 mg by mouth every morning.    lisinopriL-hydrochlorothiazide (PRINZIDE,ZESTORETIC) 20-12.5 mg per tablet 1 tablet Orally Once a day    metoprolol tartrate (LOPRESSOR) 50 MG tablet Take 50 mg by mouth once daily at 6am.    minoxidiL (LONITEN) 2.5 MG tablet Take 2.5 mg by mouth.    multivitamin with minerals tablet Take 1 tablet by mouth once daily.    ofloxacin (OCUFLOX) 0.3 % ophthalmic solution SMARTSI Drop(s) Left Eye Every 6 Hours    pramipexole (MIRAPEX) 1.5 MG tablet Take 1.5 mg by mouth 2 (two) times a day.    pregabalin (LYRICA) 100 MG capsule Take 1 capsule (100 mg total) by mouth 2 (two) times daily.     telmisartan-hydrochlorothiazide (MICARDIS HCT) 40-12.5 mg per tablet Take 1 tablet by mouth once daily.    vitamin D (VITAMIN D3) 1000 units Tab     vitamin E 100 UNIT capsule Take 100 Units by mouth once daily.    ALPRAZolam (XANAX) 0.25 MG tablet 1 tablet Orally PRN (Patient not taking: Reported on 6/30/2025)    fluticasone propionate (FLONASE) 50 mcg/actuation nasal spray 2 sprays by Each Nostril route. (Patient not taking: Reported on 6/30/2025)    lisdexamfetamine (VYVANSE) 30 MG capsule 1 capsule in the morning Orally Once a day (Patient not taking: Reported on 6/30/2025)    methylPREDNISolone (MEDROL DOSEPACK) 4 mg tablet Take by mouth. (Patient not taking: Reported on 6/30/2025)    metoprolol succinate (TOPROL-XL) 25 MG 24 hr tablet 1/2 tablet Orally Once a day (Patient not taking: Reported on 6/30/2025)    NINJACOF 12.5-12.5 mg/5 mL Liqd SMARTSIG:10 Milliliter(s) By Mouth Every 6-8 Hours PRN (Patient not taking: Reported on 6/30/2025)    pantoprazole (PROTONIX) 40 MG tablet 40 mg once daily. (Patient not taking: Reported on 6/30/2025)    polysaccharide iron complex 200 mg iron Cap Take 1 capsule by mouth Daily. (Patient not taking: Reported on 6/30/2025)    pramipexole (MIRAPEX) 0.25 MG tablet 1 tablet before bedtime Orally Once a day (Patient not taking: Reported on 6/30/2025)     No current facility-administered medications for this visit.       Review of patient's allergies indicates:   Allergen Reactions    Duloxetine      Other reaction(s): Bradycardia, Hypotension, Vomit    Lorazepam      Other reaction(s): Confusion  amnesia      Tramadol Other (See Comments)     nightmares       Family History   Problem Relation Name Age of Onset    Hypertension Mother Carly     COPD Mother Carly     Hearing loss Mother Carly     Hypertension Father Kevin     COPD Father Kevin     Hearing loss Father Kevin     Breast cancer Sister Jacquelyn     Cancer Sister Jacquelyn     Diabetes Maternal Grandmother Linda          Reina    Diabetes Paternal Grandmother Reina     Cancer Paternal Grandfather Shivam        Social History[1]      Review of Systems:    Constitution:   Denies chills, fever, and sweats.  HENT:   Denies headaches or blurry vision.  Cardiovascular:  Denies chest pain or irregular heart beat.  Respiratory:   Denies cough or shortness of breath.  Gastrointestinal:  Denies abdominal pain, nausea, or vomiting.  Musculoskeletal:   Denies muscle cramps.  Neurological:   Denies dizziness or focal weakness.  Psychiatric/Behavior: Normal mental status.  Hematology/Lymph:  Denies bleeding problem or easy bruising/bleeding.  Skin:    Denies rash or suspicious lesions.    Examination:    Vital Signs:    Vitals:    06/30/25 1346   BP: (!) 152/80   Pulse: 74   Weight: 87.9 kg (193 lb 12.8 oz)   Height: 5' (1.524 m)       Body mass index is 37.85 kg/m².    Constitution:   Well-developed, well nourished patient in no acute distress.  Neurological:   Alert and oriented x 3 and cooperative to examination.     Psychiatric/Behavior: Normal mental status.  Respiratory:   No shortness of breath.  Nonlabored breathing  Cardiovascular:           Regular rate and rhythm  Eyes:    Extraoccular muscles intact  Skin:    No scars, rash or suspicious lesions.    Physical Exam:     Right hip exam     No signs of edema, erythema, or induration.    Tender over the greater trochanteric region.    Pain with internal and external rotation    Passive hip abduction 30 degrees   Passive hip flexion 90 degrees   Passive internal rotation 25 degrees   Passive external rotation 45 degrees   No weakness of the left hip was observed. An antalgic gait was observed. limping was noted     xrays    Right hip x-ray with two or more views of the AP and lateral aspects were performed.   Impressions   Moderate Joint space narrowing with osteophytes arising from the hip joint and subchondral cysts seen        Assessment:     Primary osteoarthritis of right hip  -      X-Ray Hip 2 or 3 views Right with Pelvis when performed; Future; Expected date: 06/30/2025        Plan:      Assessment & Plan    REFERRALS:  - right hip fluoroscopic guided intra-articular corticosteroid injection with no sedation.  She has moderate osteoarthritis of the right hip and has seen relief with prior injections.    The proposed procedure as well as associated risks/benefits were discussed at length with the patient and family with risks to include pain, bleeding, infection, future surgeries, neurovascular compromise, loss of limb, heart attack, stroke, deep vein thrombosis, and even death. They understand and agree with the treatment plan..    PROCEDURES:  - # Procedures  - Discussed right hip intra-articular injection options.  - Dr. Olson can perform injection under X-ray guidance.  - Injections can be done every 3-4 months if needed.             Clinical Reference Documents Added to Patient Instructions         Document    ACTIVE RANGE OF MOTION EXERCISES, BACK AND HIPS (ENGLISH)    HIP BURSITIS EXERCISES (ENGLISH)    STRENGTHENING YOUR LOWER BODY AND CORE (ENGLISH)            No follow-ups on file.    This note was generated with the assistance of ambient listening technology. Verbal consent was obtained by the patient and accompanying visitor(s) for the recording of patient appointment to facilitate this note. I attest to having reviewed and edited the generated note for accuracy, though some syntax or spelling errors may persist. Please contact the author of this note for any clarification.       DISCLAIMER: This note may have been dictated using voice recognition software and may contain grammatical errors.          [1]   Social History  Socioeconomic History    Marital status:    Tobacco Use    Smoking status: Former     Current packs/day: 0.00     Average packs/day: 1 pack/day for 10.0 years (10.0 ttl pk-yrs)     Types: Cigarettes     Start date: 3/18/1976     Quit date: 3/18/1986      Years since quittin.3     Passive exposure: Past    Smokeless tobacco: Never   Substance and Sexual Activity    Alcohol use: Never    Drug use: Never    Sexual activity: Yes     Partners: Male     Birth control/protection: Post-menopausal

## 2025-06-30 NOTE — H&P (VIEW-ONLY)
Chief Complaint:   Chief Complaint   Patient presents with    Right Hip - Follow-up     Ongoing right hip pain. Worse with ambulation bending. Not currently in PT. Not currently taking medication for pain. Denies prior sx.        History of present illness:      History of Present Illness    CHIEF COMPLAINT:  - Right hip pain    HPI:  Ms. Trejo presents for evaluation of right hip pain located in the groin area and radiating. She describes the pain as severe, stating that it hurts in the groin area and radiates to other areas. She reports that the pain is intense and there is no comfortable position. She received a cortisone injection in her right hip in October, which provided excellent relief for her daughter's wedding. She has not undergone any other treatments for her right hip pain. She has a history of knee and hip surgeries, including a left hip replacement about a year ago.    PREVIOUS TREATMENTS:  - Right hip corticosteroid injection: October, provided excellent relief for the patient's daughter's wedding    SURGICAL HISTORY:  - Left hip replacement: approximately 1 year ago  - Knee surgeries: timeline not specified          Past Medical History:   Diagnosis Date    Acid reflux     Arthritis     chronic lymphocytic leukemia     Hypertension     Primary osteoarthritis of left hip 01/04/2024    Renal cyst     Sleep apnea     non compliant with cpap       Past Surgical History:   Procedure Laterality Date    BREAST SURGERY Left 1991?    lumpectomy, NO Restrictions    CARPAL TUNNEL RELEASE      CATARACT EXTRACTION W/ INTRAOCULAR LENS IMPLANT Bilateral     CATARACT EXTRACTION, BILATERAL      CYST REMOVAL      EPIDURAL STEROID INJECTION      EXCISION OF THYROGLOSSAL DUCT CYST      EYE SURGERY  Cataract surgery: left eye 6/17/24 right eye 7/8/24    1    INJECTION OF JOINT Right 10/22/2024    Procedure: Injection, Joint, Shoulder, Hip, Or Knee;  Surgeon: Isidro Barone MD;  Location: [facility];  Service:  Orthopedics;  Laterality: Right;  Right hip intra-articular corticosteroid injection under fluoroscopy   No sedation    JOINT REPLACEMENT      Left knee    KNEE JOINT MANIPULATION Left 2022    Procedure: MANIPULATION, KNEE;  Surgeon: Sohan Bowens MD;  Location: [facility] OR;  Service: Orthopedics;  Laterality: Left;    REVISION OF KNEE ARTHROPLASTY Left 2022    Procedure: REVISION, ARTHROPLASTY, KNEE;  Surgeon: Sohan Bowens MD;  Location: [facility] OR;  Service: Orthopedics;  Laterality: Left;  FEMUR / PATHOLOGY / ASHLEY    ROBOTIC ARTHROPLASTY, HIP Left 2024    Procedure: ROBOTIC ARTHROPLASTY,HIP;  Surgeon: Isidro Barone MD;  Location: [facility] OR;  Service: Orthopedics;  Laterality: Left;    ROBOTIC ARTHROPLASTY, KNEE Right 2023    Procedure: ROBOTIC ARTHROPLASTY, KNEE, TOTAL;  Surgeon: Isidro Barone MD;  Location: [facility] OR;  Service: Orthopedics;  Laterality: Right;    SHOULDER SURGERY Right     SINUS SURGERY      x 2    SPINE SURGERY      Bulging disc in lower back    TONSILLECTOMY         Current Outpatient Medications   Medication Sig    dexlansoprazole (DEXILANT) 60 mg capsule 1 capsule.    fexofenadine (ALLEGRA) 180 MG tablet Take 180 mg by mouth.    levocetirizine (XYZAL) 5 MG tablet Take 5 mg by mouth.    lisdexamfetamine (VYVANSE) 70 MG capsule Take 70 mg by mouth every morning.    lisinopriL-hydrochlorothiazide (PRINZIDE,ZESTORETIC) 20-12.5 mg per tablet 1 tablet Orally Once a day    metoprolol tartrate (LOPRESSOR) 50 MG tablet Take 50 mg by mouth once daily at 6am.    minoxidiL (LONITEN) 2.5 MG tablet Take 2.5 mg by mouth.    multivitamin with minerals tablet Take 1 tablet by mouth once daily.    ofloxacin (OCUFLOX) 0.3 % ophthalmic solution SMARTSI Drop(s) Left Eye Every 6 Hours    pramipexole (MIRAPEX) 1.5 MG tablet Take 1.5 mg by mouth 2 (two) times a day.    pregabalin (LYRICA) 100 MG capsule Take 1 capsule (100 mg total) by mouth 2 (two) times daily.     telmisartan-hydrochlorothiazide (MICARDIS HCT) 40-12.5 mg per tablet Take 1 tablet by mouth once daily.    vitamin D (VITAMIN D3) 1000 units Tab     vitamin E 100 UNIT capsule Take 100 Units by mouth once daily.    ALPRAZolam (XANAX) 0.25 MG tablet 1 tablet Orally PRN (Patient not taking: Reported on 6/30/2025)    fluticasone propionate (FLONASE) 50 mcg/actuation nasal spray 2 sprays by Each Nostril route. (Patient not taking: Reported on 6/30/2025)    lisdexamfetamine (VYVANSE) 30 MG capsule 1 capsule in the morning Orally Once a day (Patient not taking: Reported on 6/30/2025)    methylPREDNISolone (MEDROL DOSEPACK) 4 mg tablet Take by mouth. (Patient not taking: Reported on 6/30/2025)    metoprolol succinate (TOPROL-XL) 25 MG 24 hr tablet 1/2 tablet Orally Once a day (Patient not taking: Reported on 6/30/2025)    NINJACOF 12.5-12.5 mg/5 mL Liqd SMARTSIG:10 Milliliter(s) By Mouth Every 6-8 Hours PRN (Patient not taking: Reported on 6/30/2025)    pantoprazole (PROTONIX) 40 MG tablet 40 mg once daily. (Patient not taking: Reported on 6/30/2025)    polysaccharide iron complex 200 mg iron Cap Take 1 capsule by mouth Daily. (Patient not taking: Reported on 6/30/2025)    pramipexole (MIRAPEX) 0.25 MG tablet 1 tablet before bedtime Orally Once a day (Patient not taking: Reported on 6/30/2025)     No current facility-administered medications for this visit.       Review of patient's allergies indicates:   Allergen Reactions    Duloxetine      Other reaction(s): Bradycardia, Hypotension, Vomit    Lorazepam      Other reaction(s): Confusion  amnesia      Tramadol Other (See Comments)     nightmares       Family History   Problem Relation Name Age of Onset    Hypertension Mother Carly     COPD Mother Carly     Hearing loss Mother Carly     Hypertension Father Kevin     COPD Father Kevni     Hearing loss Father Kevin     Breast cancer Sister Jacquelyn     Cancer Sister Jacquelyn     Diabetes Maternal Grandmother Linda          Reina    Diabetes Paternal Grandmother Reina     Cancer Paternal Grandfather Shivam        Social History[1]      Review of Systems:    Constitution:   Denies chills, fever, and sweats.  HENT:   Denies headaches or blurry vision.  Cardiovascular:  Denies chest pain or irregular heart beat.  Respiratory:   Denies cough or shortness of breath.  Gastrointestinal:  Denies abdominal pain, nausea, or vomiting.  Musculoskeletal:   Denies muscle cramps.  Neurological:   Denies dizziness or focal weakness.  Psychiatric/Behavior: Normal mental status.  Hematology/Lymph:  Denies bleeding problem or easy bruising/bleeding.  Skin:    Denies rash or suspicious lesions.    Examination:    Vital Signs:    Vitals:    06/30/25 1346   BP: (!) 152/80   Pulse: 74   Weight: 87.9 kg (193 lb 12.8 oz)   Height: 5' (1.524 m)       Body mass index is 37.85 kg/m².    Constitution:   Well-developed, well nourished patient in no acute distress.  Neurological:   Alert and oriented x 3 and cooperative to examination.     Psychiatric/Behavior: Normal mental status.  Respiratory:   No shortness of breath.  Nonlabored breathing  Cardiovascular:           Regular rate and rhythm  Eyes:    Extraoccular muscles intact  Skin:    No scars, rash or suspicious lesions.    Physical Exam:     Right hip exam     No signs of edema, erythema, or induration.    Tender over the greater trochanteric region.    Pain with internal and external rotation    Passive hip abduction 30 degrees   Passive hip flexion 90 degrees   Passive internal rotation 25 degrees   Passive external rotation 45 degrees   No weakness of the left hip was observed. An antalgic gait was observed. limping was noted     xrays    Right hip x-ray with two or more views of the AP and lateral aspects were performed.   Impressions   Moderate Joint space narrowing with osteophytes arising from the hip joint and subchondral cysts seen        Assessment:     Primary osteoarthritis of right hip  -      X-Ray Hip 2 or 3 views Right with Pelvis when performed; Future; Expected date: 06/30/2025        Plan:      Assessment & Plan    REFERRALS:  - right hip fluoroscopic guided intra-articular corticosteroid injection with no sedation.  She has moderate osteoarthritis of the right hip and has seen relief with prior injections.    The proposed procedure as well as associated risks/benefits were discussed at length with the patient and family with risks to include pain, bleeding, infection, future surgeries, neurovascular compromise, loss of limb, heart attack, stroke, deep vein thrombosis, and even death. They understand and agree with the treatment plan..    PROCEDURES:  - # Procedures  - Discussed right hip intra-articular injection options.  - Dr. Olson can perform injection under X-ray guidance.  - Injections can be done every 3-4 months if needed.             Clinical Reference Documents Added to Patient Instructions         Document    ACTIVE RANGE OF MOTION EXERCISES, BACK AND HIPS (ENGLISH)    HIP BURSITIS EXERCISES (ENGLISH)    STRENGTHENING YOUR LOWER BODY AND CORE (ENGLISH)            No follow-ups on file.    This note was generated with the assistance of ambient listening technology. Verbal consent was obtained by the patient and accompanying visitor(s) for the recording of patient appointment to facilitate this note. I attest to having reviewed and edited the generated note for accuracy, though some syntax or spelling errors may persist. Please contact the author of this note for any clarification.       DISCLAIMER: This note may have been dictated using voice recognition software and may contain grammatical errors.          [1]   Social History  Socioeconomic History    Marital status:    Tobacco Use    Smoking status: Former     Current packs/day: 0.00     Average packs/day: 1 pack/day for 10.0 years (10.0 ttl pk-yrs)     Types: Cigarettes     Start date: 3/18/1976     Quit date: 3/18/1986      Years since quittin.3     Passive exposure: Past    Smokeless tobacco: Never   Substance and Sexual Activity    Alcohol use: Never    Drug use: Never    Sexual activity: Yes     Partners: Male     Birth control/protection: Post-menopausal

## 2025-07-17 ENCOUNTER — HOSPITAL ENCOUNTER (OUTPATIENT)
Facility: HOSPITAL | Age: 75
Discharge: HOME | End: 2025-07-17
Attending: SPECIALIST | Admitting: SPECIALIST
Payer: MEDICARE

## 2025-07-17 ENCOUNTER — HOSPITAL ENCOUNTER (OUTPATIENT)
Dept: RADIOLOGY | Facility: HOSPITAL | Age: 75
Discharge: HOME | End: 2025-07-17
Attending: SPECIALIST
Payer: MEDICARE

## 2025-07-17 DIAGNOSIS — R52 PAIN: ICD-10-CM

## 2025-07-17 DIAGNOSIS — M16.11 PRIMARY OSTEOARTHRITIS OF RIGHT HIP: ICD-10-CM

## 2025-07-17 PROCEDURE — 76000 FLUOROSCOPY <1 HR PHYS/QHP: CPT | Mod: TC

## 2025-07-17 PROCEDURE — 36000704 HC OR TIME LEV I 1ST 15 MIN: Performed by: SPECIALIST

## 2025-07-17 PROCEDURE — 36000705 HC OR TIME LEV I EA ADD 15 MIN: Performed by: SPECIALIST

## 2025-07-17 PROCEDURE — 71000015 HC POSTOP RECOV 1ST HR: Performed by: SPECIALIST

## 2025-07-17 PROCEDURE — 77002 NEEDLE LOCALIZATION BY XRAY: CPT | Mod: 26,,, | Performed by: SPECIALIST

## 2025-07-17 PROCEDURE — 63600175 PHARM REV CODE 636 W HCPCS: Performed by: SPECIALIST

## 2025-07-17 PROCEDURE — 20610 DRAIN/INJ JOINT/BURSA W/O US: CPT | Performed by: SPECIALIST

## 2025-07-17 PROCEDURE — 20610 DRAIN/INJ JOINT/BURSA W/O US: CPT | Mod: RT,,, | Performed by: SPECIALIST

## 2025-07-17 RX ORDER — LIDOCAINE HYDROCHLORIDE 10 MG/ML
INJECTION, SOLUTION EPIDURAL; INFILTRATION; INTRACAUDAL; PERINEURAL
Status: DISCONTINUED | OUTPATIENT
Start: 2025-07-17 | End: 2025-07-17 | Stop reason: HOSPADM

## 2025-07-17 RX ORDER — BETAMETHASONE SODIUM PHOSPHATE AND BETAMETHASONE ACETATE 3; 3 MG/ML; MG/ML
INJECTION, SUSPENSION INTRA-ARTICULAR; INTRALESIONAL; INTRAMUSCULAR; SOFT TISSUE
Status: DISCONTINUED
Start: 2025-07-17 | End: 2025-07-17 | Stop reason: HOSPADM

## 2025-07-17 RX ORDER — LIDOCAINE HYDROCHLORIDE 10 MG/ML
INJECTION, SOLUTION INFILTRATION; PERINEURAL
Status: DISCONTINUED
Start: 2025-07-17 | End: 2025-07-17 | Stop reason: HOSPADM

## 2025-07-17 RX ORDER — MUPIROCIN 20 MG/G
OINTMENT TOPICAL 2 TIMES DAILY
Status: CANCELLED | OUTPATIENT
Start: 2025-07-17 | End: 2025-07-22

## 2025-07-17 NOTE — DISCHARGE SUMMARY
[facility] - Periop Services  Discharge Note  Short Stay    Procedure(s) (LRB):  Injection, Joint, Shoulder, Hip, Or Knee (Right)      OUTCOME: Patient tolerated treatment/procedure well without complication and is now ready for discharge.    DISPOSITION: Home or Self Care    FINAL DIAGNOSIS:  <principal problem not specified>    FOLLOWUP: None    DISCHARGE INSTRUCTIONS:    Discharge Procedure Orders   Ice to affected area     Activity as tolerated        TIME SPENT ON DISCHARGE: 5 minutes

## 2025-07-17 NOTE — OP NOTE
[facility]opa[facility] - Periop Services  General Surgery  Operative Note    SUMMARY     Date of Procedure: 7/17/2025     Procedure: Procedure(s) (LRB):  Injection, Joint, Shoulder, Hip, Or Knee (Right)   right hip fluoroscopic guided intra-articular corticosteroid injection with no anesthesia    Surgeons and Role:     * Isidro Barone MD - Primary    Assisting Surgeon: None     Pre-Operative Diagnosis: Primary osteoarthritis of right hip [M16.11]    Post-Operative Diagnosis: Post-Op Diagnosis Codes:     * Primary osteoarthritis of right hip [M16.11]    Anesthesia: Local    Operative Findings (including complications, if any):  Osteoarthritis right hip    Description of Technical Procedures:  Patient is a 74-year-old female with osteoarthritis of the right hip who elected undergo a right hip fluoroscopic guided intra-articular corticosteroid injection with no anesthesia.  Risks, benefits, alternatives, and complications of operative and nonoperative treatment were explained.  She understood, agreed, and wanted to proceed with the procedure.  Valid informed consent was obtained.  She was brought to the operating room placed supine on the operating table and underwent positioning.  She was secured in the usual sterile ChloraPrep scrub and paint followed by sterile draping was performed of the anterior aspect of the right hip.  Utilizing fluoroscopy the anterior injection site was identified and anesthetized with lidocaine.  I then advanced an 18 gauge spinal needle under fluoroscopic guidance into the right hip joint.  Position was confirmed radiographically and then 2 cc of corticosteroid and 2 cc of lidocaine were injected into the right hip joint.  Patient tolerated procedure well.  There were no complications.  A Band-Aid was applied.  She will be discharged home weight-bearing and activities as tolerated with follow up PRN.      Estimated Blood Loss (EBL): * No values recorded between 7/17/2025 12:00 AM  and 7/17/2025  6:31 AM *           Implants: * No implants in log *    Specimens:   Specimen (24h ago, onward)      None                    Condition: Good    Disposition: PACU - hemodynamically stable.

## 2025-07-18 VITALS
SYSTOLIC BLOOD PRESSURE: 150 MMHG | HEIGHT: 60 IN | BODY MASS INDEX: 39.13 KG/M2 | RESPIRATION RATE: 17 BRPM | OXYGEN SATURATION: 99 % | TEMPERATURE: 96 F | DIASTOLIC BLOOD PRESSURE: 75 MMHG | HEART RATE: 95 BPM | WEIGHT: 199.31 LBS

## 2025-07-31 ENCOUNTER — OFFICE VISIT (OUTPATIENT)
Dept: HEMATOLOGY/ONCOLOGY | Facility: CLINIC | Age: 75
End: 2025-07-31
Payer: MEDICARE

## 2025-07-31 VITALS
HEIGHT: 60 IN | BODY MASS INDEX: 38.77 KG/M2 | SYSTOLIC BLOOD PRESSURE: 124 MMHG | WEIGHT: 197.5 LBS | DIASTOLIC BLOOD PRESSURE: 75 MMHG | HEART RATE: 78 BPM | OXYGEN SATURATION: 98 % | TEMPERATURE: 98 F | RESPIRATION RATE: 16 BRPM

## 2025-07-31 DIAGNOSIS — C83.00 LYMPHOMA, SMALL LYMPHOCYTIC: ICD-10-CM

## 2025-07-31 PROCEDURE — 99214 OFFICE O/P EST MOD 30 MIN: CPT | Mod: ,,, | Performed by: STUDENT IN AN ORGANIZED HEALTH CARE EDUCATION/TRAINING PROGRAM

## 2025-07-31 NOTE — PROGRESS NOTES
Patient ID: Amita Trejo is a 74 y.o. female.    Chief Complaint: Follow-up (Patient reported no concerns today.)    Diagnosis:  SLL Lug[place] II    HPI: 73 y/o F w/ PMHx of ELI on CPAP, mitral valve prolapse, HTN, ADHD, neuropathy, DJD referred to [facility] for newly diagnosed stage II SLL    She had routine screening MMG 2/2021 that showed some mildly enlarged b/l axillary nodes. She had b/l axillary US with Dr Lugo 3/2021 most suggestive of inflammation. Repeat US of right axilla obtained 6/2021 that showed some borderline enlarged nodes with benign sonographic appearance. She had right axillary LN biopsy showing SLL/CLL    Colonoscopy Dr Kwan 2016, negative per pt      Interval history      Today, 07/31/2025, patient denies any acute concerns today.  She denies any fevers, chills, night sweats, decreased appetite or weight loss.  She denies any new lumps or bumps.  She denies any new medications, ER or hospital visits since last follow-up with us.    Past Medical History:   Diagnosis Date    Acid reflux     Arthritis     chronic lymphocytic leukemia     CLL---in remission    Hypertension     Primary osteoarthritis of left hip 01/04/2024    Renal cyst     Sleep apnea     non compliant with cpap       Past Surgical History:   Procedure Laterality Date    BREAST SURGERY Left 1991?    lumpectomy, NO Restrictions    CARPAL TUNNEL RELEASE Right     CATARACT EXTRACTION W/ INTRAOCULAR LENS IMPLANT Bilateral     EPIDURAL STEROID INJECTION      EXCISION OF THYROGLOSSAL DUCT CYST      INJECTION OF JOINT Right 10/22/2024    Procedure: Injection, Joint, Shoulder, Hip, Or Knee;  Surgeon: Isidro Barone MD;  Location: [facility] OR;  Service: Orthopedics;  Laterality: Right;  Right hip intra-articular corticosteroid injection under fluoroscopy   No sedation    INJECTION OF JOINT Right 7/17/2025    Procedure: ARTHROCENTESIS/INJECTION, SHOULDER, HIP, OR KNEE  JOINT;  Surgeon: Isidro Barone MD;  Location: [facility] OR;  Service:  Orthopedics;  Laterality: Right;  Right hip intra-articular corticosteroid injection under fluoroscopy with no sedation    JOINT REPLACEMENT      Left knee    KNEE JOINT MANIPULATION Left 2022    Procedure: MANIPULATION, KNEE;  Surgeon: Sohan Bowens MD;  Location: [facility] OR;  Service: Orthopedics;  Laterality: Left;    REVISION OF KNEE ARTHROPLASTY Left 2022    Procedure: REVISION, ARTHROPLASTY, KNEE;  Surgeon: Sohan Bowens MD;  Location: [facility] OR;  Service: Orthopedics;  Laterality: Left;  FEMUR / PATHOLOGY / ASHLEY    ROBOTIC ARTHROPLASTY, HIP Left 2024    Procedure: ROBOTIC ARTHROPLASTY,HIP;  Surgeon: Isidro Barone MD;  Location: [facility] OR;  Service: Orthopedics;  Laterality: Left;    ROBOTIC ARTHROPLASTY, KNEE Right 2023    Procedure: ROBOTIC ARTHROPLASTY, KNEE, TOTAL;  Surgeon: Isidro Barone MD;  Location: [facility] OR;  Service: Orthopedics;  Laterality: Right;    SHOULDER ARTHROSCOPY W/ ROTATOR CUFF REPAIR Right     SINUS SURGERY      x 2    SPINE SURGERY      Bulging disc in lower back    TONSILLECTOMY      adenoidectomy       Family History   Problem Relation Name Age of Onset    Hypertension Mother Carly     COPD Mother Carly     Hearing loss Mother Carly     Hypertension Father Kevin     COPD Father Kevin     Hearing loss Father Kevin     Breast cancer Sister Jacquelyn     Cancer Sister Jacquelyn     Diabetes Maternal Grandmother Linda Huang    Diabetes Paternal Grandmother Reina     Cancer Paternal Grandfather Shivam        Social History     Socioeconomic History    Marital status:    Tobacco Use    Smoking status: Former     Current packs/day: 0.00     Average packs/day: 1 pack/day for 10.0 years (10.0 ttl pk-yrs)     Types: Cigarettes     Start date: 3/18/1976     Quit date: 3/18/1986     Years since quittin.3     Passive exposure: Past    Smokeless tobacco: Never   Substance and Sexual Activity    Alcohol use: Never    Drug use: Never     Sexual activity: Yes     Partners: Male     Birth control/protection: Post-menopausal       Current Outpatient Medications   Medication Sig Dispense Refill    fexofenadine (ALLEGRA) 180 MG tablet Take 180 mg by mouth as needed.      lisdexamfetamine (VYVANSE) 70 MG capsule Take 70 mg by mouth every morning.      lisinopriL-hydrochlorothiazide (PRINZIDE,ZESTORETIC) 20-12.5 mg per tablet Take 1 tablet by mouth once daily.      metoprolol tartrate (LOPRESSOR) 50 MG tablet Take 50 mg by mouth once daily at 6am.      minoxidiL (LONITEN) 2.5 MG tablet Take 2.5 mg by mouth once daily.      multivitamin with minerals tablet Take 1 tablet by mouth once daily.      pantoprazole (PROTONIX) 40 MG tablet Take 40 mg by mouth once daily.      polysaccharide iron complex 200 mg iron Cap Take 1 capsule by mouth Daily.      pramipexole (MIRAPEX) 1.5 MG tablet Take 1.5 mg by mouth 2 (two) times a day.      pregabalin (LYRICA) 100 MG capsule Take 1 capsule (100 mg total) by mouth 2 (two) times daily. 60 capsule 2    telmisartan-hydrochlorothiazide (MICARDIS HCT) 40-12.5 mg per tablet Take 1 tablet by mouth once daily.      vitamin D (VITAMIN D3) 1000 units Tab Take 1,000 Units by mouth once daily.      vitamin E 100 UNIT capsule Take 100 Units by mouth once daily.      ALPRAZolam (XANAX) 0.25 MG tablet 1 tablet Orally PRN (Patient not taking: Reported on 6/25/2025)      dexlansoprazole (DEXILANT) 60 mg capsule Take 60 mg by mouth. (Patient not taking: Reported on 7/31/2025)      fluticasone propionate (FLONASE) 50 mcg/actuation nasal spray 2 sprays by Each Nostril route. (Patient not taking: Reported on 6/25/2025)      levocetirizine (XYZAL) 5 MG tablet Take 5 mg by mouth.      lisdexamfetamine (VYVANSE) 30 MG capsule 1 capsule in the morning Orally Once a day (Patient not taking: Reported on 7/31/2025)      methylPREDNISolone (MEDROL DOSEPACK) 4 mg tablet Take by mouth. (Patient not taking: Reported on 6/25/2025)      metoprolol  succinate (TOPROL-XL) 25 MG 24 hr tablet Take 37.5 mg by mouth Daily. (Patient not taking: Reported on 2025)      NINJACOF 12.5-12.5 mg/5 mL Liqd SMARTSIG:10 Milliliter(s) By Mouth Every 6-8 Hours PRN (Patient not taking: Reported on 2025)      ofloxacin (OCUFLOX) 0.3 % ophthalmic solution SMARTSI Drop(s) Left Eye Every 6 Hours      pramipexole (MIRAPEX) 0.25 MG tablet 1 tablet before bedtime Orally Once a day (Patient not taking: Reported on 2025)       No current facility-administered medications for this visit.       Review of patient's allergies indicates:   Allergen Reactions    Duloxetine      Other reaction(s): Bradycardia, Hypotension, Vomit    Lorazepam      Other reaction(s): Confusion  amnesia      Tramadol Other (See Comments)     nightmares         Labs:    2025 creatinine 0.9, LFTs unremarkable, uric acid 6.3, WBC count 14.12, hemoglobin 12.1, MCV 90.9, platelet count 154, ANC 2.15.    25: CMP unremarkable, , uric acid 5.2   11/10/24: CMP unremarkable, wbc 14.32, hgb 12.6, plt 171, ANC 4.79, ALC 8.72  24: CMP unremarkable, , wbc 9.39, hgb 11.7, plt 174, ANC 2.57, ALC 6.26  2024 creatinine 0.92, albumin 4.0, LFTs unremarkable, uric acid 6.4, , WBC count 12.5, hemoglobin 10.8, MCV 92.8, platelet count 169, ANC 2.52.    2023: CMP unremarkable, WBC count 14.9, hemoglobin 12.1, MCV 94.7, ANC 3.86.    2023:  WBC count 24.2, hemoglobin 13.3, MCV 94, platelet count 259.  7 Cr 0.88, Alb 3.8, TP 7.1, , Uric acid 5.5, WBC 10.61, Hgb 10.5, , ANC 2.90, ALC 6.90  3/3/22 WBC 13.5 RBC 3.74 Hg 11.2 MCV 94 rdw 13.8  ANC 3.4 ALC 8.9 Cr 1.12 Alb 4.4 Ca 9.4   21 Cr 0.86, Alb 3.8, TP 7.0, Ca 9.7, AlkPhos 103, , Uric acid 5.9, WBC 13.21, Hgb 11.4, , Retic 1.43, ANC 4.41, ALC 7.73  21 Cr 1.16 Alb 3.9 TP 7.4 Ca 9.9 AlkPhos 92 AST 21 ALT 12  ferritin 122 WBC 9.82 Hg 11.1  ANC  3.35 ALC 4.91  21  Ferritin 138 Hep B core ab neg Hep B s ag neg Hep B s ab neg Hep C neg Uric acid 6.7 WBC 8.78 Hg 12.2  ANC 2.78 ALC 5.14 Direct Joao neg B2 micro 4.2 SPEP w/ ANGIE IgG 930 IgA 467 IgM 43 Abs kappa 43 Abs lambda 27.8 SFLC ratio 1.55 no M spike  20 Cr 0.89 Alb 3.9 TP 7.1 WBC 8.46 Hg 12.9 MCV 90.9  ANC 3.18 ALC 4.25    Imagin2025 screening mammogram:  Stable, left breast nodule seen since .  Axillary lymph nodes are somewhat dense and prominent.  This should be correlated clinical examined further imaging, especially given patient's history of leukemia.  BI-RADS 2.    3/29/22 MMG prominent nodes in each axilla. Some nodes have minimally increased in size.     21 US axilla unilateral: 6 right axillary nodes, 2 are borderline enlarged measuring 2.4x1.7x1.3cm and 2.3x1.3x1.1cm. Benign sonographic appearance    21 CT N/C/A/P w/ contrast: no cervical adenopathy. Small thyroid nodules. No acute pulm disease. Small focus of nodular pleural thickening in fissure of left lung stable since 2019. Increased size of several borderline enlarged axillary LNs b/l in comparison to 2019 although this finding is nonspecific. Simple renal cysts on both kidneys increased in size compared to 2018 but not suspicious for malignancy    21 screening MMG: [id number]. Small LN in upper and outer quadrant of left breast increased slightly in size from prior exams. Multiple LN within each axilla larger and more dense than on prior exam. Lymphoma should be considered    Path:  21 right axillary LN biopsy: atypical lymphoproliferative process consistent with involvement by CLL/SLL.  Flow: abnormal B cells 82% of lymphs, CD19+, CD20+ dim, CD5+, CD23+ variable, CD11c partial+, CD38-, lambda +dim  FISH: del13q/-13 detected. del17p, t(11:14), trisomy 12, del11q and del6q negative.    Review of Systems  CONSTITUTIONAL: no fevers, no chills,no weight loss, no fatigue, no  weakness  HEMATOLOGIC: no abnormal bleeding, no abnormal bruising, no drenching night sweats  ONCOLOGIC: no new masses or lumps  HEENT: no vision loss, no tinnitus or hearing loss, no nose bleeding, no dysphagia, no odynophagia  CVS: no chest pain, no palpitations, no dyspnea on exertion  RESP: no shortness of breath, no hemoptysis, no cough  BREAST: no nipple discharge, no breast tenderness, no breast masses on self breast examination  GI: no nausea, no vomiting, no diarrhea, no constipation, no melena, no hematochezia, no hematemesis, no abdominal pain, no increase in abdominal girth  : no dysuria, no hematuria, no discharge  GYN: no abnormal vaginal bleeding, no dyspareunia, no vaginal discharge  INTEGUMENT: no rashes, no abnormal bruising, no nail pitting, no hyperpigmentation  NEURO: no falls, no memory loss, no paresthesias or dysesthesias, no urofecal incontinence or retention, no loss of strength on any extremity  MSK: +chronic lower back pain, no new joint pain but chronic b/l knee pain, Right knee replacement on 8/17/2023 with right knee swelling.  PSYCH: no suicidal or homicidal ideation, no depression, no insomnia, no anhedonia  ENDOCRINE: no heat or cold intolerance, no polyuria, no polydipsia        Objective:      Physical Exam  Vitals:    07/31/25 1446   BP: 124/75   Pulse: 78   Resp: 16   Temp: 97.7 °F (36.5 °C)         GA: AAOx3, NAD  HEENT:  moist oral mucous membranes, firm, nontender, mobile lymph nodes appreciated bilateral axilla, none larger than 2 cm  RESP:  Equal chest rise, no accessory muscle use  EXT: no deformities, no pedal edema  SKIN: no rashes, warm and dry  NEURO: normal mentation, no gross neuro deficits        Assessment and Plan       Problem List Items Addressed This Visit       Lymphoma, small lymphocytic    Relevant Orders    Comprehensive Metabolic Panel    CBC Auto Differential    Lactate Dehydrogenase           # Lymphoma, small lymphocytic    Findings are most  consistent with SLL Lugano stage II vs CLL [place] I. She does likely have peripheral blood involvement as well  In any case, no anemia, no thrombocytopenia, no B symptoms, only 2 narciso stations involved (b/l axilla) and 13q del which confers good prognosis. No IGHV available but I do not think this is necessary at this time  Had systemic imaging with CT N/C/A/P w/ IV contrast 2/2021 without any bulky disease  No further workup needed at this time. Will observe every 12-16 weeks. If any indication arises, then she will also need PET CT and bone marrow biopsy  Patient had a robotic assisted left total hip arthroplasty done on 01/04/2024          Plan   Reviewed labs noted largely stable WBC count and normal LDH, patient denies any B symptoms  Follow-up in 4 months with above workup to discuss further recommendations.  Patient was advised to call our clinic if she were to have constitutional symptoms      Portions of the record may have been created with voice recognition software. Occasional wrong-word or sound-a-like substitutions may have occurred due to the inherent limitations of voice recognition software.

## 2025-08-13 ENCOUNTER — OFFICE VISIT (OUTPATIENT)
Dept: NEUROLOGY | Facility: CLINIC | Age: 75
End: 2025-08-13
Payer: MEDICARE

## 2025-08-13 VITALS
BODY MASS INDEX: 38.28 KG/M2 | DIASTOLIC BLOOD PRESSURE: 70 MMHG | WEIGHT: 195 LBS | SYSTOLIC BLOOD PRESSURE: 120 MMHG | HEIGHT: 60 IN

## 2025-08-13 DIAGNOSIS — G62.9 SENSORY NEUROPATHY: ICD-10-CM

## 2025-08-13 DIAGNOSIS — M54.16 LUMBAR RADICULOPATHY: ICD-10-CM

## 2025-08-13 DIAGNOSIS — E66.01 MORBID (SEVERE) OBESITY DUE TO EXCESS CALORIES: ICD-10-CM

## 2025-08-13 PROCEDURE — 99205 OFFICE O/P NEW HI 60 MIN: CPT | Mod: S$PBB,,, | Performed by: PSYCHIATRY & NEUROLOGY

## 2025-08-13 PROCEDURE — 99999 PR PBB SHADOW E&M-EST. PATIENT-LVL IV: CPT | Mod: PBBFAC,,, | Performed by: PSYCHIATRY & NEUROLOGY

## 2025-08-13 PROCEDURE — 99214 OFFICE O/P EST MOD 30 MIN: CPT | Mod: PBBFAC | Performed by: PSYCHIATRY & NEUROLOGY

## (undated) DEVICE — DRESSING XEROFORM FOIL PK 1X8

## (undated) DEVICE — DRESSING N ADH OIL EMUL 3X8 3S

## (undated) DEVICE — KIT SURGICAL TURNOVER

## (undated) DEVICE — SYR W NDL BLN FILL 5ML 18GX1.5

## (undated) DEVICE — TOWEL OR DISP STRL BLUE 4/PK

## (undated) DEVICE — Device

## (undated) DEVICE — GLOVE SENSICARE PI GRN 8

## (undated) DEVICE — PADDING WYTEX UNDRCST 6INX4YD

## (undated) DEVICE — CUFF ATS 2 PORT SNGL BLDR 34IN

## (undated) DEVICE — BLADE SAG DUAL CUT 25X90MM

## (undated) DEVICE — SUT STRATAFIX PDS+ 1 CTX 24IN

## (undated) DEVICE — SUT MCRYL PLUS 4-0 PS2 27IN

## (undated) DEVICE — APPLICATOR CHLORAPREP ORN 26ML

## (undated) DEVICE — GLOVE 7.0 PROTEXIS PI BLUE

## (undated) DEVICE — CONTAINER SPECIMEN SCREW 4OZ

## (undated) DEVICE — KIT DRAPE RIO ONE PIECE W/POCK

## (undated) DEVICE — PAD ABD 8X10 STERILE

## (undated) DEVICE — KIT C.A.T.S. FAST START 4/CASE

## (undated) DEVICE — CONTRAST ISOVUE 300 50ML

## (undated) DEVICE — PIN BONE 3.2X110MM
Type: IMPLANTABLE DEVICE | Site: KNEE | Status: NON-FUNCTIONAL
Removed: 2023-08-17

## (undated) DEVICE — NDL HYPO REG 25G X 1 1/2

## (undated) DEVICE — DRAPE MEDIUM SHEET 40X70IN

## (undated) DEVICE — CORD SILICONE RETRACTOR

## (undated) DEVICE — SUT MONO 2-0 CT-1 VIL

## (undated) DEVICE — KIT CELL SAVER PEDI

## (undated) DEVICE — NDL ANES SPINAL 18X3.5ST 18G

## (undated) DEVICE — GLOVE PROTEXIS HYDROGEL SZ8.5

## (undated) DEVICE — SOL NACL IRR 1000ML BTL

## (undated) DEVICE — SPONGE LAP STRL 18X18IN

## (undated) DEVICE — SYR 30CC LUER LOCK

## (undated) DEVICE — KIT CHECKPOINT TIBIAL

## (undated) DEVICE — BANDAGE ADHESIVE FABRIC 2X4

## (undated) DEVICE — GAUZE SPONGE 4X4 12PLY

## (undated) DEVICE — STRAP POS KNEE BODY 4X60IN

## (undated) DEVICE — DRESSING ADAPTIC N ADH 3X8IN

## (undated) DEVICE — COVER TABLE HVY DTY 60X90IN

## (undated) DEVICE — SPONGE GAUZE 16PLY 4X4

## (undated) DEVICE — BLADE SAW SAG 22.13MM 0.92MM

## (undated) DEVICE — SUT VICRYL 2-0 36 CT-1

## (undated) DEVICE — SYS PREVENA 7 DAY 25.4CM 10IN

## (undated) DEVICE — ELECTRODE PATIENT RETURN DISP

## (undated) DEVICE — DRESSING AQUACEL AG ADV 3.5X12

## (undated) DEVICE — SYR DISP LL 5CC

## (undated) DEVICE — GLOVE SIGNATURE ESSNTL LTX 8

## (undated) DEVICE — TAPE POROUS 3 IN 10 YD WHITE

## (undated) DEVICE — TAPE ADH MEDIPORE 4 X 10YDS

## (undated) DEVICE — SOLUTION ACD-A 500ML

## (undated) DEVICE — GLOVE 8 PROTEXIS PI BLUE

## (undated) DEVICE — SUT PDS PLUS 1 TP1 96IN

## (undated) DEVICE — WRAP DEMAYO LEG STERILE

## (undated) DEVICE — PIN BONE 4 X 140MM STERILE
Type: IMPLANTABLE DEVICE | Site: HIP | Status: NON-FUNCTIONAL
Removed: 2024-01-04

## (undated) DEVICE — GOWN POLY REINF X-LONG 2XL

## (undated) DEVICE — SYR 10CC LUER LOCK

## (undated) DEVICE — COVER HD BACK TABLE 6FT

## (undated) DEVICE — CLOSURE SKIN STERI STRIP 1/2X4

## (undated) DEVICE — DRAPE STERI U-SHAPED 47X51IN

## (undated) DEVICE — KIT TOTAL HIP HLGC

## (undated) DEVICE — DRAPE FULL SHEET 70X100IN

## (undated) DEVICE — BLADE SURG STAINLESS STEEL #15

## (undated) DEVICE — SUT 1 36IN PDS II VIO MONO

## (undated) DEVICE — SEE MEDLINE ITEM 157125

## (undated) DEVICE — VAC WOUND DISPOSABLE PREVENA

## (undated) DEVICE — GLOVE SENSICARE PI MICRO 8

## (undated) DEVICE — RETRIEVER SUTURE HEWSON DISP

## (undated) DEVICE — SUT VICRYL BR 1 GEN 27 CT-1

## (undated) DEVICE — GLOVE PROTEXIS HYDROGEL SZ8

## (undated) DEVICE — SOL NACL IRR 3000ML

## (undated) DEVICE — SHEET XLGE DRAPE

## (undated) DEVICE — KIT VIZADISC KNEE TRACKING

## (undated) DEVICE — DRESSING TEGADERM 2 3/8 X 2.75

## (undated) DEVICE — PILLOW ABDUCTION FOAM MED

## (undated) DEVICE — DRESSING TELFA N ADH 3X8IN

## (undated) DEVICE — BLADE MAKO STANDARD

## (undated) DEVICE — GLOVE PROTEXIS BLUE LATEX 8.5

## (undated) DEVICE — SUT VICRYL 1 OB 36 CTX

## (undated) DEVICE — CUSHION  WC FOAM 20X20X.75IN

## (undated) DEVICE — SOL 1L ACD-A

## (undated) DEVICE — GOWN SURGICAL XX LARGE X LONG

## (undated) DEVICE — GLOVE PROTEXIS HYDROGEL SZ6.5

## (undated) DEVICE — SOL POVIDONE IODINE PCH 3/4OZ

## (undated) DEVICE — KIT TRACKING VIZADISC HIP

## (undated) DEVICE — BANDAGE ACE DOUBLE STER 6IN

## (undated) DEVICE — TAPE SILK 3IN

## (undated) DEVICE — SUT ETHIBOND XTRA 1 CTX

## (undated) DEVICE — HOOD FLYTE SURGICOOL

## (undated) DEVICE — TOWEL OR BLUE STRL 16X26 4/PK

## (undated) DEVICE — BLADE SAG DUAL CUT 18X90X1.35